# Patient Record
Sex: MALE | Race: WHITE | NOT HISPANIC OR LATINO | Employment: OTHER | URBAN - METROPOLITAN AREA
[De-identification: names, ages, dates, MRNs, and addresses within clinical notes are randomized per-mention and may not be internally consistent; named-entity substitution may affect disease eponyms.]

---

## 2020-02-12 ENCOUNTER — APPOINTMENT (OUTPATIENT)
Dept: MRI IMAGING | Facility: HOSPITAL | Age: 65
End: 2020-02-12

## 2020-02-12 ENCOUNTER — HOSPITAL ENCOUNTER (EMERGENCY)
Facility: HOSPITAL | Age: 65
Discharge: HOME | End: 2020-02-12
Admitting: EMERGENCY MEDICINE

## 2020-02-12 VITALS
TEMPERATURE: 97.7 F | BODY MASS INDEX: 26.48 KG/M2 | WEIGHT: 185 LBS | SYSTOLIC BLOOD PRESSURE: 175 MMHG | RESPIRATION RATE: 16 BRPM | DIASTOLIC BLOOD PRESSURE: 78 MMHG | HEART RATE: 74 BPM | OXYGEN SATURATION: 97 % | HEIGHT: 70 IN

## 2020-02-12 DIAGNOSIS — R51.9 NONINTRACTABLE HEADACHE, UNSPECIFIED CHRONICITY PATTERN, UNSPECIFIED HEADACHE TYPE: Primary | ICD-10-CM

## 2020-02-12 DIAGNOSIS — H53.9 VISUAL DISTURBANCE: ICD-10-CM

## 2020-02-12 LAB
ANION GAP SERPL CALCULATED.3IONS-SCNC: 10 MMOL/L (ref 5–15)
BASOPHILS # BLD AUTO: 0.1 10*3/MM3 (ref 0–0.2)
BASOPHILS NFR BLD AUTO: 1 % (ref 0–1.5)
BUN BLD-MCNC: 17 MG/DL (ref 8–23)
BUN/CREAT SERPL: 18.5 (ref 7–25)
CALCIUM SPEC-SCNC: 9.4 MG/DL (ref 8.6–10.5)
CHLORIDE SERPL-SCNC: 102 MMOL/L (ref 98–107)
CO2 SERPL-SCNC: 28 MMOL/L (ref 22–29)
CREAT BLD-MCNC: 0.92 MG/DL (ref 0.76–1.27)
CRP SERPL-MCNC: 0.27 MG/DL (ref 0–0.5)
DEPRECATED RDW RBC AUTO: 44.2 FL (ref 37–54)
EOSINOPHIL # BLD AUTO: 0.1 10*3/MM3 (ref 0–0.4)
EOSINOPHIL NFR BLD AUTO: 2.1 % (ref 0.3–6.2)
ERYTHROCYTE [DISTWIDTH] IN BLOOD BY AUTOMATED COUNT: 14.9 % (ref 12.3–15.4)
ERYTHROCYTE [SEDIMENTATION RATE] IN BLOOD: 25 MM/HR (ref 0–20)
GFR SERPL CREATININE-BSD FRML MDRD: 82 ML/MIN/1.73
GLUCOSE BLD-MCNC: 106 MG/DL (ref 65–99)
HCT VFR BLD AUTO: 39.2 % (ref 37.5–51)
HGB BLD-MCNC: 13.6 G/DL (ref 13–17.7)
HOLD SPECIMEN: NORMAL
HOLD SPECIMEN: NORMAL
LYMPHOCYTES # BLD AUTO: 1.9 10*3/MM3 (ref 0.7–3.1)
LYMPHOCYTES NFR BLD AUTO: 29.9 % (ref 19.6–45.3)
MCH RBC QN AUTO: 29.3 PG (ref 26.6–33)
MCHC RBC AUTO-ENTMCNC: 34.6 G/DL (ref 31.5–35.7)
MCV RBC AUTO: 84.7 FL (ref 79–97)
MONOCYTES # BLD AUTO: 0.4 10*3/MM3 (ref 0.1–0.9)
MONOCYTES NFR BLD AUTO: 6.3 % (ref 5–12)
NEUTROPHILS # BLD AUTO: 3.8 10*3/MM3 (ref 1.7–7)
NEUTROPHILS NFR BLD AUTO: 60.7 % (ref 42.7–76)
NRBC BLD AUTO-RTO: 0.1 /100 WBC (ref 0–0.2)
PLATELET # BLD AUTO: 262 10*3/MM3 (ref 140–450)
PMV BLD AUTO: 7.2 FL (ref 6–12)
POTASSIUM BLD-SCNC: 4.2 MMOL/L (ref 3.5–5.2)
RBC # BLD AUTO: 4.63 10*6/MM3 (ref 4.14–5.8)
SODIUM BLD-SCNC: 140 MMOL/L (ref 136–145)
WBC NRBC COR # BLD: 6.3 10*3/MM3 (ref 3.4–10.8)
WHOLE BLOOD HOLD SPECIMEN: NORMAL
WHOLE BLOOD HOLD SPECIMEN: NORMAL

## 2020-02-12 PROCEDURE — 86140 C-REACTIVE PROTEIN: CPT | Performed by: NURSE PRACTITIONER

## 2020-02-12 PROCEDURE — 85652 RBC SED RATE AUTOMATED: CPT | Performed by: NURSE PRACTITIONER

## 2020-02-12 PROCEDURE — 99283 EMERGENCY DEPT VISIT LOW MDM: CPT

## 2020-02-12 PROCEDURE — 80048 BASIC METABOLIC PNL TOTAL CA: CPT | Performed by: NURSE PRACTITIONER

## 2020-02-12 PROCEDURE — 70551 MRI BRAIN STEM W/O DYE: CPT

## 2020-02-12 PROCEDURE — 85025 COMPLETE CBC W/AUTO DIFF WBC: CPT | Performed by: NURSE PRACTITIONER

## 2020-02-12 RX ORDER — IBUPROFEN 800 MG/1
800 TABLET ORAL EVERY 8 HOURS PRN
Qty: 9 TABLET | Refills: 0 | Status: SHIPPED | OUTPATIENT
Start: 2020-02-12

## 2020-02-12 RX ORDER — SODIUM CHLORIDE 0.9 % (FLUSH) 0.9 %
10 SYRINGE (ML) INJECTION AS NEEDED
Status: DISCONTINUED | OUTPATIENT
Start: 2020-02-12 | End: 2020-02-12 | Stop reason: HOSPADM

## 2020-02-12 NOTE — ED NOTES
Pt reports seeing color flashes and light flashes in the bottom left section of his eye a week ago and has had a headache since then.        Elen Connors,  ARIELLA  02/12/20 1312       Elen Connors, RN  02/12/20 1312

## 2020-02-12 NOTE — ED PROVIDER NOTES
Subjective   Chief complaint: Headache visual disturbance      Context: Patient is a 65-year-old male who comes in ambulatory by private vehicle with complaints of visual changes and headache.  He states approximately 2 weeks ago he started having some color discoloration in the left lower lateral visual field with changes in color is yellow-green and blue x2 weeks ago.  He states that resolved approximately a week ago and he now has a generalized intractable headache.  He reports no history of migraines.  He states he gets an occasional mild headache that he takes an Excedrin for that relieves it.  He denies any thunderclap type noise.  He states the headache is not localized.  He denies any unilateral focal deficits weakness confusion ataxia or lethargy.  He denies any fever vomiting or diarrhea.  He states no changes in hearing or tinnitus.  Last eye exam was approximately year ago and was notified that he does have cataracts.  He has not had visual field loss and states that his vision appears back to normal now.  He has had some minor nasal congestion that he has been taking over-the-counter Sudafed occasionally for.  He denies any rash.  Denies any stiff neck or sore throat.  He denies any photophobia or phonophobia.  Has not yet seen his primary care doctor been evaluated for this.  He went to the eye doctor today and did not receive any exam and was instructed to come to the emergency department.  Patient states the only medications he takes are lisinopril and Lexapro and over-the-counter vitamins and supplements    Duration: 2 weeks    Timing: Waxes and wanes    Severity: Moderate    Associated symptoms: Denies          PCP:              Review of Systems   Constitutional: Negative.    HENT: Positive for congestion. Negative for hearing loss, tinnitus, trouble swallowing and voice change.    Eyes: Positive for visual disturbance.   Respiratory: Negative.    Cardiovascular: Negative.    Gastrointestinal:  Negative.    Endocrine: Negative.    Genitourinary: Negative.    Musculoskeletal: Negative.    Skin: Negative.    Allergic/Immunologic: Negative for immunocompromised state.   Neurological: Positive for headaches.   Hematological: Negative.    Psychiatric/Behavioral: Negative.        Past Medical History:   Diagnosis Date   • Anxiety    • Arthritis    • Hypertension        No Known Allergies    Past Surgical History:   Procedure Laterality Date   • CARPAL TUNNEL RELEASE     • CHOLECYSTECTOMY     • SHOULDER SURGERY         History reviewed. No pertinent family history.    Social History     Socioeconomic History   • Marital status: Single     Spouse name: Not on file   • Number of children: Not on file   • Years of education: Not on file   • Highest education level: Not on file   Tobacco Use   • Smoking status: Former Smoker   Substance and Sexual Activity   • Alcohol use: Yes   • Drug use: Never           Objective   Physical Exam   Eyes: Pupils are equal, round, and reactive to light. Conjunctivae, EOM and lids are normal.   Fundoscopic exam:       The right eye shows no exudate, no hemorrhage and no papilledema.        The left eye shows no exudate, no hemorrhage and no papilledema.       Vital signs in triage nurse note reviewed.   Constitutional: Awake, alert, well developed and well nourished. No acute distress is noted.   HEENT: Normocephalic, atraumatic; pupils are PERRL with intact EOM; oropharynx is pink and moist without exudate or erythema.   Neck: Supple, full range of motion without pain; no cervical lymphadenopathy.   Cardiovascular: Regular rate and rhythm, normal S1-S2.     Pulmonary: Respiratory effort regular, nonlabored; breath sounds clear to auscultation all fields.   Abdomen: Soft, nontender, nondistended with normoactive bowel sounds; no rebound or guarding.   Musculoskeletal: Independent range of motion of all extremities, no palpable tenderness or edema. Spine is midline without obvious  curvature scoliosis. No bony tenderness, soft tissue swelling, deformity is noted. Paraspinal musculature is soft, nontender.   Neuro: Alert oriented x3, speech is clear and appropriate. Cranial nerves 2-12 grossly intact, no pronator drift, normal coordination.       Procedures           ED Course      Labs Reviewed   BASIC METABOLIC PANEL - Abnormal; Notable for the following components:       Result Value    Glucose 106 (*)     All other components within normal limits    Narrative:     GFR Normal >60  Chronic Kidney Disease <60  Kidney Failure <15     SEDIMENTATION RATE - Abnormal; Notable for the following components:    Sed Rate 25 (*)     All other components within normal limits   C-REACTIVE PROTEIN - Normal   CBC WITH AUTO DIFFERENTIAL - Normal   RAINBOW DRAW    Narrative:     The following orders were created for panel order [facility] Draw.  Procedure                               Abnormality         Status                     ---------                               -----------         ------                     Light Blue Top[[id number]]                                   Final result               Green Top (Gel)[[id number]]                                  Final result               Lavender Top[[id number]]                                     Final result               Gold Top - SST[[id number]]                                   Final result                 Please view results for these tests on the individual orders.   CBC AND DIFFERENTIAL    Narrative:     The following orders were created for panel order CBC & Differential.  Procedure                               Abnormality         Status                     ---------                               -----------         ------                     CBC Auto Differential[[id number]]        Normal              Final result                 Please view results for these tests on the individual orders.   LIGHT BLUE TOP   GREEN TOP   LAVENDER TOP   GOLD TOP - SST      Medications   sodium chloride 0.9 % flush 10 mL (has no administration in time range)     Mri Brain Without Contrast    Result Date: 2/12/2020   1. Scattered mild chronic microvascular disease changes. No acute findings.   Electronically Signed By-Dr. Nelly Ireland MD On:2/12/2020 2:37 PM This report was finalized on [id number] by Dr. Nelly Ireland MD.                                         MDM  Number of Diagnoses or Management Options  Nonintractable headache, unspecified chronicity pattern, unspecified headache type:   Diagnosis management comments: Medical decision  Comorbidities:  has a past medical history of Anxiety, Arthritis, and Hypertension.  Differentials: Tumor migraine cephalgia giant cell temporal arteritis  Radiology interpretation:  X-rays reviewed by me and interpreted by radiologist,   Mri Brain Without Contrast    Result Date: 2/12/2020   1. Scattered mild chronic microvascular disease changes. No acute findings.   Electronically Signed By-Dr. Nelly Ireland MD On:2/12/2020 2:37 PM This report was finalized on [id number] by Dr. Nelly Ireland MD.    Lab interpretation:  Labs viewed by me significant for, ESR 25    i discussed findings with patient who voices understanding of discharge instructions, signs and symptoms requiring return to ED; discharged improved and in stable condition with follow up for re-evaluation.  Discussed with Dr. ryder  Patient was encouraged to follow-up with his primary care doctor and ophthalmology       Amount and/or Complexity of Data Reviewed  Clinical lab tests: reviewed    Patient Progress  Patient progress: stable      Final diagnoses:   Nonintractable headache, unspecified chronicity pattern, unspecified headache type            Adelina Poe, APRN  02/12/20 1506

## 2020-02-12 NOTE — ED NOTES
Started having flashing wheel out of left eye 2 weeks ago on and off. Now is just having a headache that he can not get rid of.      Tg Christopher, RN  02/12/20 1590

## 2020-08-01 ENCOUNTER — HOSPITAL ENCOUNTER (EMERGENCY)
Facility: HOSPITAL | Age: 65
Discharge: HOME | End: 2020-08-01
Attending: EMERGENCY MEDICINE | Admitting: EMERGENCY MEDICINE

## 2020-08-01 ENCOUNTER — APPOINTMENT (OUTPATIENT)
Dept: CT IMAGING | Facility: HOSPITAL | Age: 65
End: 2020-08-01

## 2020-08-01 ENCOUNTER — APPOINTMENT (OUTPATIENT)
Dept: GENERAL RADIOLOGY | Facility: HOSPITAL | Age: 65
End: 2020-08-01

## 2020-08-01 VITALS
SYSTOLIC BLOOD PRESSURE: 119 MMHG | BODY MASS INDEX: 27.8 KG/M2 | DIASTOLIC BLOOD PRESSURE: 73 MMHG | WEIGHT: 194.2 LBS | HEIGHT: 70 IN | HEART RATE: 86 BPM | TEMPERATURE: 100.1 F | OXYGEN SATURATION: 95 % | RESPIRATION RATE: 17 BRPM

## 2020-08-01 DIAGNOSIS — J12.9 VIRAL PNEUMONIA: ICD-10-CM

## 2020-08-01 DIAGNOSIS — U07.1 COVID-19 VIRUS INFECTION: Primary | ICD-10-CM

## 2020-08-01 LAB
ALBUMIN SERPL-MCNC: 3.7 G/DL (ref 3.5–5.2)
ALBUMIN/GLOB SERPL: 1.1 G/DL
ALP SERPL-CCNC: 127 U/L (ref 39–117)
ALT SERPL W P-5'-P-CCNC: 41 U/L (ref 1–41)
ANION GAP SERPL CALCULATED.3IONS-SCNC: 12.1 MMOL/L (ref 5–15)
AST SERPL-CCNC: 36 U/L (ref 1–40)
BASOPHILS # BLD AUTO: 0 10*3/MM3 (ref 0–0.2)
BASOPHILS NFR BLD AUTO: 0 % (ref 0–1.5)
BILIRUB SERPL-MCNC: 0.2 MG/DL (ref 0–1.2)
BUN SERPL-MCNC: 12 MG/DL (ref 8–23)
BUN/CREAT SERPL: 11.9 (ref 7–25)
CALCIUM SPEC-SCNC: 8.7 MG/DL (ref 8.6–10.5)
CHLORIDE SERPL-SCNC: 101 MMOL/L (ref 98–107)
CO2 SERPL-SCNC: 22.9 MMOL/L (ref 22–29)
CREAT SERPL-MCNC: 1.01 MG/DL (ref 0.76–1.27)
D-LACTATE SERPL-SCNC: 0.9 MMOL/L (ref 0.5–2)
DEPRECATED RDW RBC AUTO: 40.7 FL (ref 37–54)
EOSINOPHIL # BLD AUTO: 0 10*3/MM3 (ref 0–0.4)
EOSINOPHIL NFR BLD AUTO: 0 % (ref 0.3–6.2)
ERYTHROCYTE [DISTWIDTH] IN BLOOD BY AUTOMATED COUNT: 13.9 % (ref 12.3–15.4)
GFR SERPL CREATININE-BSD FRML MDRD: 74 ML/MIN/1.73
GLOBULIN UR ELPH-MCNC: 3.3 GM/DL
GLUCOSE SERPL-MCNC: 133 MG/DL (ref 65–99)
HCT VFR BLD AUTO: 35.8 % (ref 37.5–51)
HGB BLD-MCNC: 12.4 G/DL (ref 13–17.7)
HOLD SPECIMEN: NORMAL
HOLD SPECIMEN: NORMAL
IMM GRANULOCYTES # BLD AUTO: 0.02 10*3/MM3 (ref 0–0.05)
IMM GRANULOCYTES NFR BLD AUTO: 0.4 % (ref 0–0.5)
LYMPHOCYTES # BLD AUTO: 0.6 10*3/MM3 (ref 0.7–3.1)
LYMPHOCYTES NFR BLD AUTO: 10.9 % (ref 19.6–45.3)
MCH RBC QN AUTO: 28.1 PG (ref 26.6–33)
MCHC RBC AUTO-ENTMCNC: 34.6 G/DL (ref 31.5–35.7)
MCV RBC AUTO: 81 FL (ref 79–97)
MONOCYTES # BLD AUTO: 0.26 10*3/MM3 (ref 0.1–0.9)
MONOCYTES NFR BLD AUTO: 4.7 % (ref 5–12)
NEUTROPHILS NFR BLD AUTO: 4.64 10*3/MM3 (ref 1.7–7)
NEUTROPHILS NFR BLD AUTO: 84 % (ref 42.7–76)
NRBC BLD AUTO-RTO: 0 /100 WBC (ref 0–0.2)
NT-PROBNP SERPL-MCNC: 133.1 PG/ML (ref 0–900)
PLATELET # BLD AUTO: 211 10*3/MM3 (ref 140–450)
PMV BLD AUTO: 9.7 FL (ref 6–12)
POTASSIUM SERPL-SCNC: 3.5 MMOL/L (ref 3.5–5.2)
PROCALCITONIN SERPL-MCNC: 0.22 NG/ML (ref 0–0.25)
PROT SERPL-MCNC: 7 G/DL (ref 6–8.5)
RBC # BLD AUTO: 4.42 10*6/MM3 (ref 4.14–5.8)
SODIUM SERPL-SCNC: 136 MMOL/L (ref 136–145)
TROPONIN T SERPL-MCNC: <0.01 NG/ML (ref 0–0.03)
WBC # BLD AUTO: 5.52 10*3/MM3 (ref 3.4–10.8)
WHOLE BLOOD HOLD SPECIMEN: NORMAL
WHOLE BLOOD HOLD SPECIMEN: NORMAL

## 2020-08-01 PROCEDURE — 93005 ELECTROCARDIOGRAM TRACING: CPT | Performed by: EMERGENCY MEDICINE

## 2020-08-01 PROCEDURE — 70450 CT HEAD/BRAIN W/O DYE: CPT

## 2020-08-01 PROCEDURE — 83605 ASSAY OF LACTIC ACID: CPT | Performed by: EMERGENCY MEDICINE

## 2020-08-01 PROCEDURE — 87040 BLOOD CULTURE FOR BACTERIA: CPT | Performed by: EMERGENCY MEDICINE

## 2020-08-01 PROCEDURE — 84145 PROCALCITONIN (PCT): CPT | Performed by: EMERGENCY MEDICINE

## 2020-08-01 PROCEDURE — 25010000002 MORPHINE PER 10 MG: Performed by: EMERGENCY MEDICINE

## 2020-08-01 PROCEDURE — 84484 ASSAY OF TROPONIN QUANT: CPT | Performed by: EMERGENCY MEDICINE

## 2020-08-01 PROCEDURE — 85025 COMPLETE CBC W/AUTO DIFF WBC: CPT | Performed by: EMERGENCY MEDICINE

## 2020-08-01 PROCEDURE — 83880 ASSAY OF NATRIURETIC PEPTIDE: CPT | Performed by: EMERGENCY MEDICINE

## 2020-08-01 PROCEDURE — 93010 ELECTROCARDIOGRAM REPORT: CPT | Performed by: INTERNAL MEDICINE

## 2020-08-01 PROCEDURE — 99284 EMERGENCY DEPT VISIT MOD MDM: CPT

## 2020-08-01 PROCEDURE — 80053 COMPREHEN METABOLIC PANEL: CPT | Performed by: EMERGENCY MEDICINE

## 2020-08-01 PROCEDURE — 71045 X-RAY EXAM CHEST 1 VIEW: CPT

## 2020-08-01 PROCEDURE — 96374 THER/PROPH/DIAG INJ IV PUSH: CPT

## 2020-08-01 RX ORDER — MORPHINE SULFATE 2 MG/ML
4 INJECTION, SOLUTION INTRAMUSCULAR; INTRAVENOUS ONCE
Status: COMPLETED | OUTPATIENT
Start: 2020-08-01 | End: 2020-08-01

## 2020-08-01 RX ORDER — SODIUM CHLORIDE 0.9 % (FLUSH) 0.9 %
10 SYRINGE (ML) INJECTION AS NEEDED
Status: DISCONTINUED | OUTPATIENT
Start: 2020-08-01 | End: 2020-08-01 | Stop reason: HOSPADM

## 2020-08-01 RX ORDER — ALBUTEROL SULFATE 90 UG/1
AEROSOL, METERED RESPIRATORY (INHALATION)
Qty: 1 INHALER | Refills: 0 | Status: SHIPPED | OUTPATIENT
Start: 2020-08-01

## 2020-08-01 RX ORDER — ACETAMINOPHEN 500 MG
1000 TABLET ORAL ONCE
Status: COMPLETED | OUTPATIENT
Start: 2020-08-01 | End: 2020-08-01

## 2020-08-01 RX ORDER — ESCITALOPRAM OXALATE 5 MG/1
5 TABLET ORAL DAILY
COMMUNITY
Start: 2015-04-21

## 2020-08-01 RX ORDER — LISINOPRIL 5 MG/1
5 TABLET ORAL DAILY
COMMUNITY
Start: 2015-04-21

## 2020-08-01 RX ADMIN — SODIUM CHLORIDE 1000 ML: 9 INJECTION, SOLUTION INTRAVENOUS at 13:44

## 2020-08-01 RX ADMIN — MORPHINE SULFATE 4 MG: 2 INJECTION, SOLUTION INTRAMUSCULAR; INTRAVENOUS at 14:09

## 2020-08-01 RX ADMIN — ACETAMINOPHEN 1000 MG: 500 TABLET, FILM COATED ORAL at 14:08

## 2020-08-01 NOTE — ED PROVIDER NOTES
EMERGENCY DEPARTMENT ENCOUNTER    Room Number:  19/19  Date of encounter:  8/1/2020  PCP: Xavier Walden MD  Historian: Patient    Patient was placed in face mask during triage process. Patient was wearing facemask when I entered the room and throughout our encounter. I wore full protective equipment throughout this patient encounter including a face mask, eye protection, and gloves. Hand hygiene was performed before donning protective equipment and again following doffing of PPE after leaving the room.    HPI:  Chief Complaint: Shortness of breath status post COVID-19 diagnosis  A complete HPI/ROS/PMH/PSH/SH/FH are unobtainable due to: N/A   Context: Sudhir Diaz is a 65 y.o. male who presents to the ED c/o continued generalized weakness, increased cough with some shortness of breath, headache, and diminished appetite.  Patient developed symptoms 10 days ago and was diagnosed 1 week ago with COVID-19.  He reports significant fatigue.  He has had intermittent headache but had a passing out spell after episode of coughing yesterday.  No known trauma but does report the headache is 7 out of 10.  No other exacerbating relieving factors.  No vomiting or diarrhea in the last few days.  Patient does continue to have significant chills.      MEDICAL HISTORY REVIEW      PAST MEDICAL HISTORY  Active Ambulatory Problems     Diagnosis Date Noted   • No Active Ambulatory Problems     Resolved Ambulatory Problems     Diagnosis Date Noted   • No Resolved Ambulatory Problems     Past Medical History:   Diagnosis Date   • Anxiety    • Arthritis    • Hypertension          PAST SURGICAL HISTORY  Past Surgical History:   Procedure Laterality Date   • CARPAL TUNNEL RELEASE     • CHOLECYSTECTOMY     • SHOULDER SURGERY           FAMILY HISTORY  History reviewed. No pertinent family history.      SOCIAL HISTORY  Social History     Socioeconomic History   • Marital status: Single     Spouse name: Not on file   • Number of children:  Not on file   • Years of education: Not on file   • Highest education level: Not on file   Tobacco Use   • Smoking status: Former Smoker   • Smokeless tobacco: Never Used   Substance and Sexual Activity   • Alcohol use: Yes     Comment: occasional   • Drug use: Never         ALLERGIES  Patient has no known allergies.        REVIEW OF SYSTEMS  Review of Systems     All systems reviewed and negative except for those discussed in HPI.       PHYSICAL EXAM    I have reviewed the triage vital signs and nursing notes.    ED Triage Vitals [08/01/20 1209]   Temp Heart Rate Resp BP SpO2   100.1 °F (37.8 °C) 100 18 130/76 95 %      Temp src Heart Rate Source Patient Position BP Location FiO2 (%)   Tympanic Monitor Sitting Right arm --       Physical Exam    Physical Exam   Constitutional: No distress.  Ill-appearing though not overtly toxic  HENT:  Head: Normocephalic and atraumatic.  No focal external findings of trauma  Oropharynx: Mucous membranes are moist.   Eyes: No scleral icterus. No conjunctival pallor.  No midline tenderness to palpation or step-off.  Neck: Painless range of motion noted. Neck supple.   Cardiovascular: Normal rate, regular rhythm and intact distal pulses.  Pulmonary/Chest: No respiratory distress. There are no wheezes, no rhonchi, and no rales.   Abdominal: Soft. There is no tenderness. There is no rebound and no guarding.   Musculoskeletal: Moves all extremities equally. There is no pedal edema or calf tenderness.   Neurological: Alert.  Baseline strength and sensation noted.   Skin: Skin is pink, warm, and dry. No pallor.   Psychiatric: Mood and affect normal.   Nursing note and vitals reviewed.    LAB RESULTS  Recent Results (from the past 24 hour(s))   Comprehensive Metabolic Panel    Collection Time: 08/01/20  1:40 PM   Result Value Ref Range    Glucose 133 (H) 65 - 99 mg/dL    BUN 12 8 - 23 mg/dL    Creatinine 1.01 0.76 - 1.27 mg/dL    Sodium 136 136 - 145 mmol/L    Potassium 3.5 3.5 - 5.2  mmol/L    Chloride 101 98 - 107 mmol/L    CO2 22.9 22.0 - 29.0 mmol/L    Calcium 8.7 8.6 - 10.5 mg/dL    Total Protein 7.0 6.0 - 8.5 g/dL    Albumin 3.70 3.50 - 5.20 g/dL    ALT (SGPT) 41 1 - 41 U/L    AST (SGOT) 36 1 - 40 U/L    Alkaline Phosphatase 127 (H) 39 - 117 U/L    Total Bilirubin 0.2 0.0 - 1.2 mg/dL    eGFR Non African Amer 74 >60 mL/min/1.73    Globulin 3.3 gm/dL    A/G Ratio 1.1 g/dL    BUN/Creatinine Ratio 11.9 7.0 - 25.0    Anion Gap 12.1 5.0 - 15.0 mmol/L   Lactic Acid, Plasma    Collection Time: 08/01/20  1:40 PM   Result Value Ref Range    Lactate 0.9 0.5 - 2.0 mmol/L   Procalcitonin    Collection Time: 08/01/20  1:40 PM   Result Value Ref Range    Procalcitonin 0.22 0.00 - 0.25 ng/mL   Troponin    Collection Time: 08/01/20  1:40 PM   Result Value Ref Range    Troponin T <0.010 0.000 - 0.030 ng/mL   BNP    Collection Time: 08/01/20  1:40 PM   Result Value Ref Range    proBNP 133.1 0.0 - 900.0 pg/mL   Light Blue Top    Collection Time: 08/01/20  1:40 PM   Result Value Ref Range    Extra Tube hold for add-on    Green Top (Gel)    Collection Time: 08/01/20  1:40 PM   Result Value Ref Range    Extra Tube Hold for add-ons.    Lavender Top    Collection Time: 08/01/20  1:40 PM   Result Value Ref Range    Extra Tube hold for add-on    Gold Top - SST    Collection Time: 08/01/20  1:40 PM   Result Value Ref Range    Extra Tube Hold for add-ons.    CBC Auto Differential    Collection Time: 08/01/20  1:40 PM   Result Value Ref Range    WBC 5.52 3.40 - 10.80 10*3/mm3    RBC 4.42 4.14 - 5.80 10*6/mm3    Hemoglobin 12.4 (L) 13.0 - 17.7 g/dL    Hematocrit 35.8 (L) 37.5 - 51.0 %    MCV 81.0 79.0 - 97.0 fL    MCH 28.1 26.6 - 33.0 pg    MCHC 34.6 31.5 - 35.7 g/dL    RDW 13.9 12.3 - 15.4 %    RDW-SD 40.7 37.0 - 54.0 fl    MPV 9.7 6.0 - 12.0 fL    Platelets 211 140 - 450 10*3/mm3    Neutrophil % 84.0 (H) 42.7 - 76.0 %    Lymphocyte % 10.9 (L) 19.6 - 45.3 %    Monocyte % 4.7 (L) 5.0 - 12.0 %    Eosinophil % 0.0 (L) 0.3  - 6.2 %    Basophil % 0.0 0.0 - 1.5 %    Immature Grans % 0.4 0.0 - 0.5 %    Neutrophils, Absolute 4.64 1.70 - 7.00 10*3/mm3    Lymphocytes, Absolute 0.60 (L) 0.70 - 3.10 10*3/mm3    Monocytes, Absolute 0.26 0.10 - 0.90 10*3/mm3    Eosinophils, Absolute 0.00 0.00 - 0.40 10*3/mm3    Basophils, Absolute 0.00 0.00 - 0.20 10*3/mm3    Immature Grans, Absolute 0.02 0.00 - 0.05 10*3/mm3    nRBC 0.0 0.0 - 0.2 /100 WBC       Ordered the above labs and independently reviewed the results.        RADIOLOGY  Ct Head Without Contrast    Result Date: 8/1/2020  CT HEAD WO CONTRAST-  INDICATIONS: Headache, syncope  TECHNIQUE: Radiation dose reduction techniques were utilized, including automated exposure control and exposure modulation based on body size. Noncontrast head CT  COMPARISON: None available  FINDINGS:    No acute intracranial hemorrhage, midline shift or mass effect. No acute territorial infarct is identified.  Arterial calcifications are seen at the base of the brain.  Ventricles, cisterns, cerebral sulci are unremarkable for patient age.  Frontal sinuses are hypoplastic. The visualized paranasal sinuses, orbits, mastoid air cells are otherwise unremarkable.           No acute intracranial hemorrhage or hydrocephalus. If there is further clinical concern, MRI could be considered for further evaluation.  This report was finalized on 8/1/2020 2:24 PM by Dr. Kj King M.D.      Xr Chest 1 View    Result Date: 8/1/2020  SINGLE VIEW CHEST RADIOGRAPH  HISTORY: 65-year-old male who is COVID positive and is undergoing evaluation for progression of symptoms.  FINDINGS: An upright AP portable chest radiograph was obtained. No prior chest radiograph is available for comparison. Interstitial opacification is seen in the bilateral mid and lower lung fields. The lungs are clear of consolidation. The cardiac silhouette is enlarged. No evidence for a pneumothorax or pleural effusion is appreciated.      Cardiomegaly with  bibasilar interstitial pneumonia and/or scarring.        I ordered the above noted radiological studies. Reviewed by me and discussed with radiologist.  See dictation for official radiology interpretation.      PROCEDURES    Procedures        MEDICATIONS GIVEN IN ER    Medications   sodium chloride 0.9 % flush 10 mL (has no administration in time range)   sodium chloride 0.9 % bolus 1,000 mL (0 mL Intravenous Stopped 8/1/20 1519)   acetaminophen (TYLENOL) tablet 1,000 mg (1,000 mg Oral Given 8/1/20 1408)   morphine injection 4 mg (4 mg Intravenous Given 8/1/20 1409)         PROGRESS, DATA ANALYSIS, CONSULTS, AND MEDICAL DECISION MAKING    My differential diagnosis includes but is not limited to generalized weakness, CVA, TIA, acute MI, GI bleed, urinary tract infection, systemic infections including sepsis, alcohol abuse, drug abuse including prescription and street drug.      All labs have been independently reviewed by me.  All radiology studies have been reviewed by me and discussed with radiologist dictating the report.   EKG's independently viewed and interpreted by me.  Discussion below represents my analysis of pertinent findings related to patient's condition, differential diagnosis, treatment plan and final disposition.      ED Course as of Aug 01 1615   Sat Aug 01, 2020   1327 EKG           EKG time: 3  Rhythm/Rate: Sinus, 85 SHARONA within normal limits  P waves and WI: Narrow QRS complex  QRS, axis: Narrow QRS complex  ST and T waves: No STEMI; QTC within normal limits    Interpreted Contemporaneously by me, independently viewed        [RS]   7547 No acute life threat identified.  Patient maintaining reasonable pulse oximetry.  Chest x-ray likely represents atelectasis from inactivity but viral pneumonia is also possibility.  Patient appears stable for discharge.  We will add an albuterol inhaler to his prescription list, encourage p.o. fluids, and disposition him with a pulse oximetry monitor his own oxygen  level at home.  Patient agreeable discharge plan.    [RS]      ED Course User Index  [RS] Ghulam West MD       AS OF 16:15 VITALS:    BP - 124/73  HR - 80  TEMP - 100.1 °F (37.8 °C) (Tympanic)  O2 SATS - 95%        DIAGNOSIS  Final diagnoses:   COVID-19 virus infection   Viral pneumonia         DISPOSITION  DISCHARGE    Patient discharged in stable condition.    Reviewed implications of results, diagnosis, meds, responsibility to follow up, warning signs and symptoms of possible worsening, potential complications and reasons to return to ER.    Patient/Family voiced understanding of above instructions.    Discussed plan for discharge, as there is no emergent indication for admission. Patient referred to primary care provider for regular health maintenance. Pt/family is agreeable and understands need for follow up and possible repeat testing.  Pt is aware that discharge does not mean that nothing is wrong but it indicates no emergency is present that requires admission and they must continue care with follow-up as given below or physician of their choice.     FOLLOW-UP  Xavier Walden MD  [address] [facility] [address] [phone]    Schedule an appointment as soon as possible for a visit       [facility]  [address] [facility] [address] [phone]  Go to   As needed, If symptoms worsen         Medication List      New Prescriptions    albuterol sulfate  (90 Base) MCG/ACT inhaler  Commonly known as:  PROVENTIL HFA;VENTOLIN HFA;PROAIR HFA  Inhale 2 puffs every 4 hours when necessary wheeze, dyspnea, cough             Ghulam West MD  08/01/20 3812

## 2020-08-01 NOTE — PROGRESS NOTES
ER provider, Dr. West, requested CCP give pulse oximetry to pt. Pt provided with pulse oximetry and educated on use by primary RN, Milagro, while wearing full PPE. Notified of when to contact PCP. Call center phone number given to pt. Monique Nunn, RN

## 2020-08-06 LAB
BACTERIA SPEC AEROBE CULT: NORMAL
BACTERIA SPEC AEROBE CULT: NORMAL

## 2020-11-25 NOTE — ED NOTES
I was on full PPE while doing EKG     Ambrocio Gore  08/01/20 3004    
Pt  A&ox4, abc's intact, NAD noted. Pt ambulatory with steady gait on discharge.      Milagro Merritt, RN  08/01/20 3416    
Pt presents to ED via EMS from home. Pt states he tested positive for COVID on Saturday. Pt states this morning he feels more SOA and had a fall last night. Pt states he woke up on the floor this morning. Pt is currently A&OX4, able to ambulate, and in a mask at this time.      Janeth Cox, RN  08/01/20 5650    
Pt waiting in room until wife arrives to take him home. Discharge instructions reviewed, pt stated understanding.      Milagro Merritt RN  08/01/20 8238    
RN wearing mask, gown, face shield, hair and shoe covers and gloves throughout duration of care.        Milagro Merritt, RN  08/01/20 3636    
PROBLEM DIAGNOSES  Problem: Unilateral primary osteoarthritis, left hip  Assessment and Plan: Left total hip arthroplaxty  labs - cbc,pt/ptt,bmp,t&s,nose cx,ekg  M/C required  preop 3 day hibiclens instruction reviewed and given .instructed on if  nose cx positive use mupuricin 5 days and checklist given  take routine meds DOS with sips of water. avoid NSAID and OTC supplements. verbalized understanding  information on proper nutrition , increase protein and better food choices provided in packet   Ensure clear given    Problem: Obesity  Assessment and Plan: ROMIE percautions

## 2022-12-01 PROCEDURE — U0004 COV-19 TEST NON-CDC HGH THRU: HCPCS | Performed by: FAMILY MEDICINE

## 2023-09-21 ENCOUNTER — TRANSCRIBE ORDERS (OUTPATIENT)
Dept: ADMINISTRATIVE | Facility: HOSPITAL | Age: 68
End: 2023-09-21
Payer: COMMERCIAL

## 2023-09-21 DIAGNOSIS — M54.50 BACK PAIN AT L4-L5 LEVEL: Primary | ICD-10-CM

## 2023-10-02 ENCOUNTER — HOSPITAL ENCOUNTER (OUTPATIENT)
Dept: ULTRASOUND IMAGING | Facility: HOSPITAL | Age: 68
Discharge: HOME | End: 2023-10-02
Admitting: INTERNAL MEDICINE
Payer: COMMERCIAL

## 2023-10-02 DIAGNOSIS — M54.50 BACK PAIN AT L4-L5 LEVEL: ICD-10-CM

## 2023-10-02 PROCEDURE — 76775 US EXAM ABDO BACK WALL LIM: CPT

## 2024-04-03 ENCOUNTER — HOSPITAL ENCOUNTER (OUTPATIENT)
Dept: GENERAL RADIOLOGY | Facility: HOSPITAL | Age: 69
Discharge: HOME | End: 2024-04-03
Admitting: INTERNAL MEDICINE
Payer: COMMERCIAL

## 2024-04-03 ENCOUNTER — TRANSCRIBE ORDERS (OUTPATIENT)
Dept: ADMINISTRATIVE | Facility: HOSPITAL | Age: 69
End: 2024-04-03
Payer: COMMERCIAL

## 2024-04-03 DIAGNOSIS — R06.02 SHORTNESS OF BREATH: Primary | ICD-10-CM

## 2024-04-03 DIAGNOSIS — R06.02 SHORTNESS OF BREATH: ICD-10-CM

## 2024-04-03 PROCEDURE — 71046 X-RAY EXAM CHEST 2 VIEWS: CPT

## 2024-04-09 ENCOUNTER — PATIENT ROUNDING (BHMG ONLY) (OUTPATIENT)
Dept: CARDIOLOGY | Facility: CLINIC | Age: 69
End: 2024-04-09

## 2024-04-09 ENCOUNTER — OFFICE VISIT (OUTPATIENT)
Dept: CARDIOLOGY | Facility: CLINIC | Age: 69
End: 2024-04-09
Payer: COMMERCIAL

## 2024-04-09 VITALS
SYSTOLIC BLOOD PRESSURE: 140 MMHG | BODY MASS INDEX: 26.63 KG/M2 | DIASTOLIC BLOOD PRESSURE: 90 MMHG | WEIGHT: 186 LBS | HEART RATE: 87 BPM | HEIGHT: 70 IN | OXYGEN SATURATION: 97 %

## 2024-04-09 DIAGNOSIS — R06.02 SHORTNESS OF BREATH: ICD-10-CM

## 2024-04-09 DIAGNOSIS — R07.89 CHEST DISCOMFORT: Primary | ICD-10-CM

## 2024-04-09 DIAGNOSIS — I10 ESSENTIAL HYPERTENSION: ICD-10-CM

## 2024-04-09 DIAGNOSIS — I44.7 LBBB (LEFT BUNDLE BRANCH BLOCK): ICD-10-CM

## 2024-04-09 PROCEDURE — 99204 OFFICE O/P NEW MOD 45 MIN: CPT | Performed by: INTERNAL MEDICINE

## 2024-04-09 PROCEDURE — 93000 ELECTROCARDIOGRAM COMPLETE: CPT | Performed by: INTERNAL MEDICINE

## 2024-04-09 RX ORDER — BUSPIRONE HYDROCHLORIDE 10 MG/1
TABLET ORAL
COMMUNITY
Start: 2024-04-03

## 2024-04-09 RX ORDER — LISINOPRIL 20 MG/1
20 TABLET ORAL EVERY MORNING
COMMUNITY
Start: 2024-02-08

## 2024-04-09 NOTE — PROGRESS NOTES
A My-Chart message has been sent to the patient for PATIENT ROUNDING with [facility]

## 2024-04-09 NOTE — PROGRESS NOTES
Encounter Date:04/09/2024      Patient ID: Sudhir Diaz is a 69 y.o. male.    Chief Complaint:  Chest discomfort  Shortness of breath  Left bundle branch block  Hypertension  Dyslipidemia    History of present illness  The patient is a pleasant 69-year-old white male is here for evaluation of shortness of breath and chest discomfort.  Lately patient has been having significant anxiety issues.  Patient has been having exertional chest tightness and shortness of breath with activities such as going to the mailbox walking the dog etc.  Denies having any other associated aggravating or alleviating factors.    Patient is not having any palpitations, dizziness or syncope.  Denies having any headache ,abdominal pain ,nausea, vomiting , diarrhea constipation, loss of weight or loss of appetite.  Denies having any excessive bruising ,hematuria or blood in the stool.    Review of all systems negative except as indicated.    Reviewed ROS.  Assessment and Plan     ]]]]]]]]]]]]]]]]]]]  Impression  ========  - Exertional chest tightness and shortness of breath-suggestive of angina pectoris.    - Left bundle branch block-new since 8/1/2020.    - Hypertension dyslipidemia    - Status post cholecystectomy shoulder surgery and carpal tunnel surgery.    - Family history negative for coronary artery disease    - Former smoker    - No known allergies  ===========  Plan  ==========  Patient has exertional chest tightness and shortness of breath suggestive of angina pectoris.  EKG showed sinus rhythm premature ventricular contractions left bundle branch block.  Chest x-ray-normal-4/3/2024.    Left bundle branch block-new since 8/1/2020.    Stress Cardiolite test  Echocardiogram  Patient may need cardiac catheterization and coronary arteriography.    Hypertension  Blood pressure 140/90.  Maintain blood pressure log.    Dyslipidemia-diet controlled.    Follow-up in the office on the same day.    Further plan will depend on patient's  progress.  ]]]]]]]]]]]]]]]]]]]           Diagnosis Plan   1. Chest discomfort  Adult Transthoracic Echo Complete W/ Cont if Necessary Per Protocol    Stress Test With Myocardial Perfusion One Day    ECG 12 Lead      2. Shortness of breath  Adult Transthoracic Echo Complete W/ Cont if Necessary Per Protocol    Stress Test With Myocardial Perfusion One Day    ECG 12 Lead      3. Essential hypertension  Adult Transthoracic Echo Complete W/ Cont if Necessary Per Protocol    Stress Test With Myocardial Perfusion One Day    ECG 12 Lead      4. LBBB (left bundle branch block)  Adult Transthoracic Echo Complete W/ Cont if Necessary Per Protocol    Stress Test With Myocardial Perfusion One Day    ECG 12 Lead      LAB RESULTS (LAST 7 DAYS)    CBC        BMP        CMP         BNP        TROPONIN        CoAg        Creatinine Clearance  CrCl cannot be calculated (Patient's most recent lab result is older than the maximum 30 days allowed.).    ABG        Radiology  No radiology results for the last day                The following portions of the patient's history were reviewed and updated as appropriate: allergies, current medications, past family history, past medical history, past social history, past surgical history, and problem list.    Review of Systems   Constitutional: Negative for malaise/fatigue.   Cardiovascular:  Positive for chest pain. Negative for dyspnea on exertion, leg swelling and palpitations.   Respiratory:  Positive for shortness of breath. Negative for cough.    Gastrointestinal:  Negative for abdominal pain, nausea and vomiting.   Neurological:  Negative for dizziness, focal weakness, headaches, light-headedness and numbness.   All other systems reviewed and are negative.        Current Outpatient Medications:     albuterol sulfate  (90 Base) MCG/ACT inhaler, Inhale 2 puffs every 4 hours when necessary wheeze, dyspnea, cough, Disp: 1 inhaler, Rfl: 0    busPIRone (BUSPAR) 10 MG tablet, , Disp: ,  "Rfl:     escitalopram (LEXAPRO) 5 MG tablet, Take 1 tablet by mouth Daily., Disp: , Rfl:     lisinopril (PRINIVIL,ZESTRIL) 20 MG tablet, Take 1 tablet by mouth Every Morning., Disp: , Rfl:     No Known Allergies    History reviewed. No pertinent family history.    Past Surgical History:   Procedure Laterality Date    CARPAL TUNNEL RELEASE      CHOLECYSTECTOMY      SHOULDER SURGERY         Past Medical History:   Diagnosis Date    Anxiety     Arthritis     Hypertension        History reviewed. No pertinent family history.    Social History     Socioeconomic History    Marital status: Single   Tobacco Use    Smoking status: Former     Passive exposure: Past    Smokeless tobacco: Never   Vaping Use    Vaping status: Never Used   Substance and Sexual Activity    Alcohol use: Yes     Comment: occasional    Drug use: Never    Sexual activity: Defer           ECG 12 Lead    Date/Time: 4/9/2024 11:22 AM  Performed by: Peg Pettit MD    Authorized by: Peg Pettit MD  Comparison: compared with previous ECG   Similar to previous ECG  Comparison to previous ECG: Normal sinus rhythm left bundle branch block premature ventricular contraction 87/min normal axis compared to 8/1/2020 left bundle branch block is new.          Objective:       Physical Exam    /90 (BP Location: Left arm, Patient Position: Sitting, Cuff Size: Adult)   Pulse 87   Ht 177.8 cm (70\")   Wt 84.4 kg (186 lb)   SpO2 97%   BMI 26.69 kg/m²   The patient is alert, oriented and in no distress.    Vital signs as noted above.    Head and neck revealed no carotid bruits or jugular venous distension.  No thyromegaly or lymphadenopathy is present.    Lungs clear.  No wheezing.  Breath sounds are normal bilaterally.    Heart normal first and second heart sounds.  No murmur..  No pericardial rub is present.  No gallop is present.    Abdomen soft and nontender.  No organomegaly is present.    Extremities revealed good peripheral pulses without any " pedal edema.    Skin warm and dry.    Musculoskeletal system is grossly normal.    CNS grossly normal.    Reviewed and updated.

## 2024-05-09 ENCOUNTER — HOSPITAL ENCOUNTER (OUTPATIENT)
Dept: CARDIOLOGY | Facility: HOSPITAL | Age: 69
Discharge: HOME | End: 2024-05-09
Payer: COMMERCIAL

## 2024-06-11 ENCOUNTER — HOSPITAL ENCOUNTER (OUTPATIENT)
Dept: CARDIOLOGY | Facility: HOSPITAL | Age: 69
Discharge: HOME | End: 2024-06-11
Admitting: INTERNAL MEDICINE
Payer: COMMERCIAL

## 2024-06-11 ENCOUNTER — HOSPITAL ENCOUNTER (OUTPATIENT)
Dept: CARDIOLOGY | Facility: HOSPITAL | Age: 69
Discharge: HOME | End: 2024-06-11
Payer: COMMERCIAL

## 2024-06-11 ENCOUNTER — OFFICE VISIT (OUTPATIENT)
Dept: CARDIOLOGY | Facility: CLINIC | Age: 69
End: 2024-06-11
Payer: COMMERCIAL

## 2024-06-11 VITALS
HEIGHT: 70 IN | BODY MASS INDEX: 26.63 KG/M2 | SYSTOLIC BLOOD PRESSURE: 122 MMHG | HEART RATE: 70 BPM | WEIGHT: 186 LBS | DIASTOLIC BLOOD PRESSURE: 80 MMHG

## 2024-06-11 VITALS
DIASTOLIC BLOOD PRESSURE: 90 MMHG | HEIGHT: 70 IN | BODY MASS INDEX: 26.63 KG/M2 | WEIGHT: 186 LBS | SYSTOLIC BLOOD PRESSURE: 140 MMHG

## 2024-06-11 DIAGNOSIS — I10 ESSENTIAL HYPERTENSION: ICD-10-CM

## 2024-06-11 DIAGNOSIS — I44.7 LBBB (LEFT BUNDLE BRANCH BLOCK): ICD-10-CM

## 2024-06-11 DIAGNOSIS — R07.89 CHEST DISCOMFORT: ICD-10-CM

## 2024-06-11 DIAGNOSIS — R06.02 SHORTNESS OF BREATH: ICD-10-CM

## 2024-06-11 DIAGNOSIS — R94.39 ABNORMAL NUCLEAR STRESS TEST: Primary | ICD-10-CM

## 2024-06-11 LAB
BH CV ECHO MEAS - ACS: 2.18 CM
BH CV ECHO MEAS - AO MAX PG: 3 MMHG
BH CV ECHO MEAS - AO MEAN PG: 1.69 MMHG
BH CV ECHO MEAS - AO ROOT DIAM: 3.2 CM
BH CV ECHO MEAS - AO V2 MAX: 87.2 CM/SEC
BH CV ECHO MEAS - AO V2 VTI: 16.8 CM
BH CV ECHO MEAS - AVA(I,D): 1.72 CM2
BH CV ECHO MEAS - EDV(CUBED): 151.7 ML
BH CV ECHO MEAS - EDV(MOD-SP4): 136.4 ML
BH CV ECHO MEAS - EF(MOD-BP): 23 %
BH CV ECHO MEAS - EF(MOD-SP4): 23.3 %
BH CV ECHO MEAS - ESV(CUBED): 116.1 ML
BH CV ECHO MEAS - ESV(MOD-SP4): 104.6 ML
BH CV ECHO MEAS - FS: 8.5 %
BH CV ECHO MEAS - IVS/LVPW: 0.99 CM
BH CV ECHO MEAS - IVSD: 1.16 CM
BH CV ECHO MEAS - LA DIMENSION: 4.3 CM
BH CV ECHO MEAS - LV MASS(C)D: 248.3 GRAMS
BH CV ECHO MEAS - LV MAX PG: 1.51 MMHG
BH CV ECHO MEAS - LV MEAN PG: 0.86 MMHG
BH CV ECHO MEAS - LV V1 MAX: 61.4 CM/SEC
BH CV ECHO MEAS - LV V1 VTI: 11.2 CM
BH CV ECHO MEAS - LVIDD: 5.3 CM
BH CV ECHO MEAS - LVIDS: 4.9 CM
BH CV ECHO MEAS - LVOT AREA: 2.6 CM2
BH CV ECHO MEAS - LVOT DIAM: 1.81 CM
BH CV ECHO MEAS - LVPWD: 1.17 CM
BH CV ECHO MEAS - MR MAX PG: 91.2 MMHG
BH CV ECHO MEAS - MR MAX VEL: 477.1 CM/SEC
BH CV ECHO MEAS - PA ACC TIME: 0.15 SEC
BH CV ECHO MEAS - PA V2 MAX: 78.8 CM/SEC
BH CV ECHO MEAS - PI END-D VEL: 179.5 CM/SEC
BH CV ECHO MEAS - RAP SYSTOLE: 3 MMHG
BH CV ECHO MEAS - RV MAX PG: 1.13 MMHG
BH CV ECHO MEAS - RV V1 MAX: 53.1 CM/SEC
BH CV ECHO MEAS - RV V1 VTI: 12.8 CM
BH CV ECHO MEAS - RVDD: 2.9 CM
BH CV ECHO MEAS - RVSP: 40.2 MMHG
BH CV ECHO MEAS - SV(LVOT): 28.8 ML
BH CV ECHO MEAS - SV(MOD-SP4): 31.8 ML
BH CV ECHO MEAS - TR MAX PG: 37.2 MMHG
BH CV ECHO MEAS - TR MAX VEL: 304.9 CM/SEC
BH CV REST NUCLEAR ISOTOPE DOSE: 10.3 MCI
BH CV STRESS COMMENTS STAGE 1: NORMAL
BH CV STRESS DOSE REGADENOSON STAGE 1: 0.4
BH CV STRESS DURATION MIN STAGE 1: 0
BH CV STRESS DURATION SEC STAGE 1: 10
BH CV STRESS NUCLEAR ISOTOPE DOSE: 32.7 MCI
BH CV STRESS PROTOCOL 1: NORMAL
BH CV STRESS RECOVERY BP: NORMAL MMHG
BH CV STRESS RECOVERY HR: 98 BPM
BH CV STRESS STAGE 1: 1
MAXIMAL PREDICTED HEART RATE: 149 BPM
STRESS BASELINE BP: NORMAL MMHG
STRESS BASELINE HR: 70 BPM
STRESS TARGET HR: 127 BPM

## 2024-06-11 PROCEDURE — 78452 HT MUSCLE IMAGE SPECT MULT: CPT

## 2024-06-11 PROCEDURE — 0 TECHNETIUM SESTAMIBI: Performed by: INTERNAL MEDICINE

## 2024-06-11 PROCEDURE — 25010000002 REGADENOSON 0.4 MG/5ML SOLUTION: Performed by: INTERNAL MEDICINE

## 2024-06-11 PROCEDURE — 93306 TTE W/DOPPLER COMPLETE: CPT | Performed by: INTERNAL MEDICINE

## 2024-06-11 PROCEDURE — 93017 CV STRESS TEST TRACING ONLY: CPT

## 2024-06-11 PROCEDURE — A9500 TC99M SESTAMIBI: HCPCS | Performed by: INTERNAL MEDICINE

## 2024-06-11 PROCEDURE — 93306 TTE W/DOPPLER COMPLETE: CPT

## 2024-06-11 PROCEDURE — 99214 OFFICE O/P EST MOD 30 MIN: CPT | Performed by: INTERNAL MEDICINE

## 2024-06-11 RX ORDER — REGADENOSON 0.08 MG/ML
0.4 INJECTION, SOLUTION INTRAVENOUS
Status: COMPLETED | OUTPATIENT
Start: 2024-06-11 | End: 2024-06-11

## 2024-06-11 RX ADMIN — REGADENOSON 0.4 MG: 0.08 INJECTION, SOLUTION INTRAVENOUS at 09:55

## 2024-06-11 RX ADMIN — TECHNETIUM TC 99M SESTAMIBI 1 DOSE: 1 INJECTION INTRAVENOUS at 08:18

## 2024-06-11 RX ADMIN — TECHNETIUM TC 99M SESTAMIBI 1 DOSE: 1 INJECTION INTRAVENOUS at 09:55

## 2024-06-11 NOTE — PROGRESS NOTES
Encounter Date:06/11/2024  Last seen 4/9/2024      Patient ID: Sudhir Diaz is a 69 y.o. male.      Chief Complaint:  Chest discomfort  Shortness of breath  Left bundle branch block  Hypertension  Dyslipidemia     History of present illness  Patient recently was seen with following history.  Reviewed and updated.    The patient is a pleasant 69-year-old white male is here for evaluation of shortness of breath and chest discomfort.  Lately patient has been having significant anxiety issues.  Patient has been having exertional chest tightness and shortness of breath with activities such as going to the mailbox walking the dog etc.  Denies having any other associated aggravating or alleviating factors.     Patient is not having any palpitations, dizziness or syncope.  Denies having any headache ,abdominal pain ,nausea, vomiting , diarrhea constipation, loss of weight or loss of appetite.  Denies having any excessive bruising ,hematuria or blood in the stool.     Review of all systems negative except as indicated.     Reviewed ROS.  Assessment and Plan      ]]]]]]]]]]]]]]]]]]]  Impression  ========  - Exertional chest tightness and shortness of breath-suggestive of angina pectoris.     - Left bundle branch block-new since 8/1/2020.    - Cardiomyopathy-ischemic versus nonischemic    Stress Cardiolite test 6/11/2024   Lexiscan Cardiolite test is abnormal with inferior ischemia.     Gated SPECT images revealed left ventricular enlargement with diffuse hypocontractility with ejection fraction of 36%.    Echocardiogram 6/11/2024  Moderate mitral mild tricuspid and pulmonic regurgitation is present.  Left atrial enlargement..  Left ventricle is enlarged with paradoxical septal motion (left bundle branch block) and severe and diffuse hypocontractility with ejection fraction of 20 to 25%.  Grade 1 left ventricular diastolic dysfunction is present.  Right ventricle is normal in size with hypocontractility.  TAPSE 1.3  cm..  Mild pulmonary hypertension.    - Hypertension dyslipidemia     - Status post cholecystectomy shoulder surgery and carpal tunnel surgery.     - Family history negative for coronary artery disease     - Former smoker     - No known allergies  ===========  Plan  ==========  Patient has exertional chest tightness and shortness of breath suggestive of angina pectoris.  EKG showed sinus rhythm premature ventricular contractions left bundle branch block.  Chest x-ray-normal-4/3/2024.     Left bundle branch block-new since 8/1/2020.    Cardiomyopathy-ischemic versus nonischemic     Stress Cardiolite test-as above  Echocardiogram-as above    Patient was educated regarding the results of the testing.     Hypertension  Blood pressure 122/80     Dyslipidemia-diet controlled.    Patient was advised right and left heart catheterization.  Patient was asked to take aspirin.  Patient is on lisinopril.  Patient likely would need GDMT.  Risks and benefits pros and cons of the procedure including infection bleeding blood clot heart attack stroke allergic reaction to the dye renal dysfunction etc. were discussed.      Further plan will depend on patient's progress.    Reviewed and updated.  ]]]]]]]]]]]]]]]]]]]          Diagnosis Plan   1. Abnormal nuclear stress test  Case Request Cath Lab: Left and Right Heart Cath with Coronary Angiography    CBC (No Diff)    Basic Metabolic Panel    Protime-INR    Lipid Panel    ECG 12 Lead    XR Chest 2 View      2. Shortness of breath  Case Request Cath Lab: Left and Right Heart Cath with Coronary Angiography    CBC (No Diff)    Basic Metabolic Panel    Protime-INR    Lipid Panel    ECG 12 Lead    XR Chest 2 View      3. Essential hypertension  Case Request Cath Lab: Left and Right Heart Cath with Coronary Angiography    CBC (No Diff)    Basic Metabolic Panel    Protime-INR    Lipid Panel    ECG 12 Lead    XR Chest 2 View      4. Chest discomfort  Case Request Cath Lab: Left and Right Heart  Cath with Coronary Angiography    CBC (No Diff)    Basic Metabolic Panel    Protime-INR    Lipid Panel    ECG 12 Lead    XR Chest 2 View      5. LBBB (left bundle branch block)  Case Request Cath Lab: Left and Right Heart Cath with Coronary Angiography    CBC (No Diff)    Basic Metabolic Panel    Protime-INR    Lipid Panel    ECG 12 Lead    XR Chest 2 View      LAB RESULTS (LAST 7 DAYS)    CBC        BMP        CMP         BNP        TROPONIN        CoAg        Creatinine Clearance  CrCl cannot be calculated (Patient's most recent lab result is older than the maximum 30 days allowed.).    ABG        Radiology  No radiology results for the last day                The following portions of the patient's history were reviewed and updated as appropriate: allergies, current medications, past family history, past medical history, past social history, past surgical history, and problem list.    Review of Systems   Constitutional: Negative for malaise/fatigue.   Cardiovascular:  Negative for chest pain, leg swelling, palpitations and syncope.   Respiratory:  Negative for shortness of breath.    Skin:  Negative for rash.   Gastrointestinal:  Negative for nausea and vomiting.   Neurological:  Negative for dizziness, light-headedness and numbness.   All other systems reviewed and are negative.        Current Outpatient Medications:     albuterol sulfate  (90 Base) MCG/ACT inhaler, Inhale 2 puffs every 4 hours when necessary wheeze, dyspnea, cough, Disp: 1 inhaler, Rfl: 0    busPIRone (BUSPAR) 10 MG tablet, , Disp: , Rfl:     escitalopram (LEXAPRO) 5 MG tablet, Take 1 tablet by mouth Daily., Disp: , Rfl:     lisinopril (PRINIVIL,ZESTRIL) 20 MG tablet, Take 1 tablet by mouth Every Morning., Disp: , Rfl:   No current facility-administered medications for this visit.    No Known Allergies    History reviewed. No pertinent family history.    Past Surgical History:   Procedure Laterality Date    CARPAL TUNNEL RELEASE       "CHOLECYSTECTOMY      SHOULDER SURGERY         Past Medical History:   Diagnosis Date    Anxiety     Arthritis     Hypertension        History reviewed. No pertinent family history.    Social History     Socioeconomic History    Marital status: Single   Tobacco Use    Smoking status: Former     Passive exposure: Past    Smokeless tobacco: Never   Vaping Use    Vaping status: Never Used   Substance and Sexual Activity    Alcohol use: Yes     Comment: occasional    Drug use: Never    Sexual activity: Defer         Procedures      Objective:       Physical Exam    /80   Pulse 70   Ht 177.8 cm (70\")   Wt 84.4 kg (186 lb)   BMI 26.69 kg/m²   The patient is alert, oriented and in no distress.    Vital signs as noted above.    Head and neck revealed no carotid bruits or jugular venous distension.  No thyromegaly or lymphadenopathy is present.    Lungs clear.  No wheezing.  Breath sounds are normal bilaterally.    Heart normal first and second heart sounds.  No murmur..  No pericardial rub is present.  No gallop is present.    Abdomen soft and nontender.  No organomegaly is present.    Extremities revealed good peripheral pulses without any pedal edema.    Skin warm and dry.    Musculoskeletal system is grossly normal.    CNS grossly normal.    Reviewed and updated.        "

## 2024-06-17 ENCOUNTER — APPOINTMENT (OUTPATIENT)
Dept: CARDIOLOGY | Facility: HOSPITAL | Age: 69
End: 2024-06-17
Payer: COMMERCIAL

## 2024-06-17 ENCOUNTER — HOSPITAL ENCOUNTER (OUTPATIENT)
Dept: GENERAL RADIOLOGY | Facility: HOSPITAL | Age: 69
Discharge: HOME | End: 2024-06-17
Payer: COMMERCIAL

## 2024-06-17 ENCOUNTER — LAB (OUTPATIENT)
Dept: LAB | Facility: HOSPITAL | Age: 69
End: 2024-06-17
Payer: COMMERCIAL

## 2024-06-17 DIAGNOSIS — R94.39 ABNORMAL NUCLEAR STRESS TEST: ICD-10-CM

## 2024-06-17 DIAGNOSIS — I10 ESSENTIAL HYPERTENSION: ICD-10-CM

## 2024-06-17 DIAGNOSIS — R07.89 CHEST DISCOMFORT: ICD-10-CM

## 2024-06-17 DIAGNOSIS — I44.7 LBBB (LEFT BUNDLE BRANCH BLOCK): ICD-10-CM

## 2024-06-17 DIAGNOSIS — R06.02 SHORTNESS OF BREATH: ICD-10-CM

## 2024-06-17 LAB
ANION GAP SERPL CALCULATED.3IONS-SCNC: 8.6 MMOL/L (ref 5–15)
BUN SERPL-MCNC: 23 MG/DL (ref 8–23)
BUN/CREAT SERPL: 22.1 (ref 7–25)
CALCIUM SPEC-SCNC: 9.3 MG/DL (ref 8.6–10.5)
CHLORIDE SERPL-SCNC: 108 MMOL/L (ref 98–107)
CHOLEST SERPL-MCNC: 236 MG/DL (ref 0–200)
CO2 SERPL-SCNC: 24.4 MMOL/L (ref 22–29)
CREAT SERPL-MCNC: 1.04 MG/DL (ref 0.76–1.27)
DEPRECATED RDW RBC AUTO: 47.6 FL (ref 37–54)
EGFRCR SERPLBLD CKD-EPI 2021: 76.8 ML/MIN/1.73
ERYTHROCYTE [DISTWIDTH] IN BLOOD BY AUTOMATED COUNT: 14.8 % (ref 12.3–15.4)
GLUCOSE SERPL-MCNC: 120 MG/DL (ref 65–99)
HCT VFR BLD AUTO: 41.1 % (ref 37.5–51)
HDLC SERPL-MCNC: 64 MG/DL (ref 40–60)
HGB BLD-MCNC: 12.9 G/DL (ref 13–17.7)
INR PPP: 0.99 (ref 0.93–1.1)
LDLC SERPL CALC-MCNC: 151 MG/DL (ref 0–100)
LDLC/HDLC SERPL: 2.32 {RATIO}
MCH RBC QN AUTO: 27.4 PG (ref 26.6–33)
MCHC RBC AUTO-ENTMCNC: 31.4 G/DL (ref 31.5–35.7)
MCV RBC AUTO: 87.3 FL (ref 79–97)
PLATELET # BLD AUTO: 230 10*3/MM3 (ref 140–450)
PMV BLD AUTO: 9.8 FL (ref 6–12)
POTASSIUM SERPL-SCNC: 4.3 MMOL/L (ref 3.5–5.2)
PROTHROMBIN TIME: 10.8 SECONDS (ref 9.6–11.7)
RBC # BLD AUTO: 4.71 10*6/MM3 (ref 4.14–5.8)
SODIUM SERPL-SCNC: 141 MMOL/L (ref 136–145)
TRIGL SERPL-MCNC: 119 MG/DL (ref 0–150)
VLDLC SERPL-MCNC: 21 MG/DL (ref 5–40)
WBC NRBC COR # BLD AUTO: 5.46 10*3/MM3 (ref 3.4–10.8)

## 2024-06-17 PROCEDURE — 80061 LIPID PANEL: CPT

## 2024-06-17 PROCEDURE — 85610 PROTHROMBIN TIME: CPT

## 2024-06-17 PROCEDURE — 71046 X-RAY EXAM CHEST 2 VIEWS: CPT

## 2024-06-17 PROCEDURE — 85027 COMPLETE CBC AUTOMATED: CPT

## 2024-06-17 PROCEDURE — 80048 BASIC METABOLIC PNL TOTAL CA: CPT

## 2024-06-17 PROCEDURE — 36415 COLL VENOUS BLD VENIPUNCTURE: CPT

## 2024-06-19 ENCOUNTER — HOSPITAL ENCOUNTER (OUTPATIENT)
Facility: HOSPITAL | Age: 69
Setting detail: HOSPITAL OUTPATIENT SURGERY
Discharge: HOME | End: 2024-06-19
Attending: INTERNAL MEDICINE | Admitting: INTERNAL MEDICINE
Payer: COMMERCIAL

## 2024-06-19 ENCOUNTER — APPOINTMENT (OUTPATIENT)
Dept: CARDIOLOGY | Facility: HOSPITAL | Age: 69
End: 2024-06-19
Payer: COMMERCIAL

## 2024-06-19 VITALS
SYSTOLIC BLOOD PRESSURE: 131 MMHG | HEART RATE: 79 BPM | BODY MASS INDEX: 27.7 KG/M2 | OXYGEN SATURATION: 97 % | WEIGHT: 182.76 LBS | TEMPERATURE: 98.2 F | DIASTOLIC BLOOD PRESSURE: 76 MMHG | HEIGHT: 68 IN | RESPIRATION RATE: 15 BRPM

## 2024-06-19 DIAGNOSIS — I10 ESSENTIAL HYPERTENSION: ICD-10-CM

## 2024-06-19 DIAGNOSIS — R94.39 ABNORMAL NUCLEAR STRESS TEST: ICD-10-CM

## 2024-06-19 DIAGNOSIS — R06.02 SHORTNESS OF BREATH: ICD-10-CM

## 2024-06-19 DIAGNOSIS — I44.7 LBBB (LEFT BUNDLE BRANCH BLOCK): ICD-10-CM

## 2024-06-19 DIAGNOSIS — R07.89 CHEST DISCOMFORT: ICD-10-CM

## 2024-06-19 LAB
ARTERIAL PATENCY WRIST A: ABNORMAL
ATMOSPHERIC PRESS: ABNORMAL MM[HG]
BASE EXCESS BLDA CALC-SCNC: 0 MMOL/L (ref 0–3)
BDY SITE: ABNORMAL
BH CV XLRA MEAS - DIST GSV CALF DIST LEFT: 0.25 CM
BH CV XLRA MEAS - DIST GSV CALF DIST RIGHT: 0.27 CM
BH CV XLRA MEAS - DIST GSV THIGH DIST LEFT: 0.35 CM
BH CV XLRA MEAS - DIST GSV THIGH DIST RIGHT: 0.49 CM
BH CV XLRA MEAS - MID GSV CALF LEFT: 0.3 CM
BH CV XLRA MEAS - MID GSV CALF RIGHT: 0.22 CM
BH CV XLRA MEAS - MID GSV THIGH  LEFT: 0.42 CM
BH CV XLRA MEAS - MID GSV THIGH  RIGHT: 0.46 CM
BH CV XLRA MEAS - PROX GSV CALF DIST LEFT: 0.26 CM
BH CV XLRA MEAS - PROX GSV CALF DIST RIGHT: 0.39 CM
BH CV XLRA MEAS - PROX GSV THIGH  LEFT: 0.4 CM
BH CV XLRA MEAS - PROX GSV THIGH  RIGHT: 0.51 CM
BH CV XLRA MEAS LEFT DIST CCA EDV: 23.8 CM/SEC
BH CV XLRA MEAS LEFT DIST CCA PSV: 90.4 CM/SEC
BH CV XLRA MEAS LEFT DIST ICA EDV: -50.8 CM/SEC
BH CV XLRA MEAS LEFT DIST ICA PSV: -108 CM/SEC
BH CV XLRA MEAS LEFT ICA/CCA RATIO: -0.87
BH CV XLRA MEAS LEFT PROX CCA EDV: 40.7 CM/SEC
BH CV XLRA MEAS LEFT PROX CCA PSV: 148 CM/SEC
BH CV XLRA MEAS LEFT PROX ECA PSV: -81.6 CM/SEC
BH CV XLRA MEAS LEFT PROX ICA EDV: -50.4 CM/SEC
BH CV XLRA MEAS LEFT PROX ICA PSV: -129 CM/SEC
BH CV XLRA MEAS LEFT PROX SCLA PSV: 147 CM/SEC
BH CV XLRA MEAS LEFT VERTEBRAL A EDV: 22 CM/SEC
BH CV XLRA MEAS LEFT VERTEBRAL A PSV: 50 CM/SEC
BH CV XLRA MEAS RIGHT DIST CCA EDV: 21.1 CM/SEC
BH CV XLRA MEAS RIGHT DIST CCA PSV: 77.7 CM/SEC
BH CV XLRA MEAS RIGHT DIST ICA EDV: -37.9 CM/SEC
BH CV XLRA MEAS RIGHT DIST ICA PSV: -106 CM/SEC
BH CV XLRA MEAS RIGHT ICA/CCA RATIO: 1.1
BH CV XLRA MEAS RIGHT MID ICA EDV: -47.8 CM/SEC
BH CV XLRA MEAS RIGHT MID ICA PSV: -111 CM/SEC
BH CV XLRA MEAS RIGHT PROX CCA EDV: -22.4 CM/SEC
BH CV XLRA MEAS RIGHT PROX CCA PSV: -101 CM/SEC
BH CV XLRA MEAS RIGHT PROX ECA PSV: -75.8 CM/SEC
BH CV XLRA MEAS RIGHT PROX ICA EDV: -32.9 CM/SEC
BH CV XLRA MEAS RIGHT PROX ICA PSV: -103 CM/SEC
BH CV XLRA MEAS RIGHT PROX SCLA PSV: 107 CM/SEC
BH CV XLRA MEAS RIGHT VERTEBRAL A EDV: 16.8 CM/SEC
BH CV XLRA MEAS RIGHT VERTEBRAL A PSV: 52.8 CM/SEC
CA-I BLDA-SCNC: 1.21 MMOL/L (ref 1.15–1.33)
CREAT BLDA-MCNC: 0.88 MG/DL (ref 0.6–1.3)
D-LACTATE SERPL-SCNC: 0.6 MMOL/L (ref 0.2–2)
EGFRCR SERPLBLD CKD-EPI 2021: 91.9 ML/MIN/1.73
GLUCOSE BLDC GLUCOMTR-MCNC: 113 MG/DL (ref 74–100)
GLUCOSE BLDC GLUCOMTR-MCNC: 113 MG/DL (ref 74–100)
HCO3 BLDA-SCNC: 25.2 MMOL/L (ref 21–28)
HCT VFR BLDA CALC: 38 % (ref 38–51)
HEMODILUTION: NO
HGB BLDA-MCNC: 12.8 G/DL (ref 12–17)
INHALED O2 CONCENTRATION: 21 %
MODALITY: ABNORMAL
PCO2 BLDA: 42 MM HG (ref 35–48)
PH BLDA: 7.39 PH UNITS (ref 7.35–7.45)
PO2 BLD: 380 MM[HG] (ref 0–500)
PO2 BLDA: 79.7 MM HG (ref 83–108)
POTASSIUM BLDA-SCNC: 4.1 MMOL/L (ref 3.5–4.5)
SAO2 % BLDCOA: 95.5 % (ref 94–98)
SODIUM BLD-SCNC: 144 MMOL/L (ref 138–146)

## 2024-06-19 PROCEDURE — C1894 INTRO/SHEATH, NON-LASER: HCPCS | Performed by: INTERNAL MEDICINE

## 2024-06-19 PROCEDURE — 99152 MOD SED SAME PHYS/QHP 5/>YRS: CPT | Performed by: INTERNAL MEDICINE

## 2024-06-19 PROCEDURE — 93970 EXTREMITY STUDY: CPT | Performed by: STUDENT IN AN ORGANIZED HEALTH CARE EDUCATION/TRAINING PROGRAM

## 2024-06-19 PROCEDURE — 93880 EXTRACRANIAL BILAT STUDY: CPT

## 2024-06-19 PROCEDURE — 93970 EXTREMITY STUDY: CPT

## 2024-06-19 PROCEDURE — C1769 GUIDE WIRE: HCPCS | Performed by: INTERNAL MEDICINE

## 2024-06-19 PROCEDURE — 93880 EXTRACRANIAL BILAT STUDY: CPT | Performed by: STUDENT IN AN ORGANIZED HEALTH CARE EDUCATION/TRAINING PROGRAM

## 2024-06-19 PROCEDURE — 80051 ELECTROLYTE PANEL: CPT

## 2024-06-19 PROCEDURE — 82565 ASSAY OF CREATININE: CPT

## 2024-06-19 PROCEDURE — 82948 REAGENT STRIP/BLOOD GLUCOSE: CPT

## 2024-06-19 PROCEDURE — 25010000002 MIDAZOLAM PER 1 MG: Performed by: INTERNAL MEDICINE

## 2024-06-19 PROCEDURE — 25810000003 SODIUM CHLORIDE 0.9 % SOLUTION: Performed by: INTERNAL MEDICINE

## 2024-06-19 PROCEDURE — 82330 ASSAY OF CALCIUM: CPT

## 2024-06-19 PROCEDURE — 82803 BLOOD GASES ANY COMBINATION: CPT

## 2024-06-19 PROCEDURE — 93460 R&L HRT ART/VENTRICLE ANGIO: CPT | Performed by: INTERNAL MEDICINE

## 2024-06-19 PROCEDURE — 85018 HEMOGLOBIN: CPT

## 2024-06-19 PROCEDURE — 99153 MOD SED SAME PHYS/QHP EA: CPT | Performed by: INTERNAL MEDICINE

## 2024-06-19 PROCEDURE — 25010000002 FENTANYL CITRATE (PF) 100 MCG/2ML SOLUTION: Performed by: INTERNAL MEDICINE

## 2024-06-19 PROCEDURE — 25510000001 IOPAMIDOL PER 1 ML: Performed by: INTERNAL MEDICINE

## 2024-06-19 PROCEDURE — 83605 ASSAY OF LACTIC ACID: CPT

## 2024-06-19 RX ORDER — SODIUM CHLORIDE 9 MG/ML
250 INJECTION, SOLUTION INTRAVENOUS ONCE AS NEEDED
Status: DISCONTINUED | OUTPATIENT
Start: 2024-06-19 | End: 2024-06-19 | Stop reason: HOSPADM

## 2024-06-19 RX ORDER — ACETAMINOPHEN 325 MG/1
650 TABLET ORAL EVERY 4 HOURS PRN
Status: DISCONTINUED | OUTPATIENT
Start: 2024-06-19 | End: 2024-06-19 | Stop reason: HOSPADM

## 2024-06-19 RX ORDER — MIDAZOLAM HYDROCHLORIDE 1 MG/ML
INJECTION INTRAMUSCULAR; INTRAVENOUS
Status: DISCONTINUED | OUTPATIENT
Start: 2024-06-19 | End: 2024-06-19 | Stop reason: HOSPADM

## 2024-06-19 RX ORDER — FENTANYL CITRATE 50 UG/ML
INJECTION, SOLUTION INTRAMUSCULAR; INTRAVENOUS
Status: DISCONTINUED | OUTPATIENT
Start: 2024-06-19 | End: 2024-06-19 | Stop reason: HOSPADM

## 2024-06-19 RX ORDER — DIPHENHYDRAMINE HCL 25 MG
25 CAPSULE ORAL EVERY 6 HOURS PRN
Status: DISCONTINUED | OUTPATIENT
Start: 2024-06-19 | End: 2024-06-19 | Stop reason: HOSPADM

## 2024-06-19 RX ORDER — CARVEDILOL 3.12 MG/1
3.12 TABLET ORAL 2 TIMES DAILY WITH MEALS
Status: DISCONTINUED | OUTPATIENT
Start: 2024-06-19 | End: 2024-06-19 | Stop reason: HOSPADM

## 2024-06-19 RX ORDER — ISOSORBIDE MONONITRATE 60 MG/1
60 TABLET, EXTENDED RELEASE ORAL
Status: DISCONTINUED | OUTPATIENT
Start: 2024-06-19 | End: 2024-06-19 | Stop reason: HOSPADM

## 2024-06-19 RX ORDER — LIDOCAINE HYDROCHLORIDE 20 MG/ML
INJECTION, SOLUTION INFILTRATION; PERINEURAL
Status: DISCONTINUED | OUTPATIENT
Start: 2024-06-19 | End: 2024-06-19 | Stop reason: HOSPADM

## 2024-06-19 RX ORDER — ASPIRIN 81 MG/1
81 TABLET ORAL DAILY
Status: DISCONTINUED | OUTPATIENT
Start: 2024-06-19 | End: 2024-06-19 | Stop reason: HOSPADM

## 2024-06-19 RX ORDER — ONDANSETRON 2 MG/ML
4 INJECTION INTRAMUSCULAR; INTRAVENOUS EVERY 6 HOURS PRN
Status: DISCONTINUED | OUTPATIENT
Start: 2024-06-19 | End: 2024-06-19 | Stop reason: HOSPADM

## 2024-06-19 RX ORDER — NITROGLYCERIN 0.4 MG/1
0.4 TABLET SUBLINGUAL
Status: DISCONTINUED | OUTPATIENT
Start: 2024-06-19 | End: 2024-06-19 | Stop reason: HOSPADM

## 2024-06-19 RX ORDER — SODIUM CHLORIDE 9 MG/ML
INJECTION, SOLUTION INTRAVENOUS
Status: COMPLETED | OUTPATIENT
Start: 2024-06-19 | End: 2024-06-19

## 2024-06-19 RX ORDER — ONDANSETRON 4 MG/1
4 TABLET, ORALLY DISINTEGRATING ORAL EVERY 6 HOURS PRN
Status: DISCONTINUED | OUTPATIENT
Start: 2024-06-19 | End: 2024-06-19 | Stop reason: HOSPADM

## 2024-06-19 NOTE — CONSULTS
Patient Care Team:  Xavier Walden MD as PCP - General (Internal Medicine)    Chief complaint: Chest pain    Subjective     History of Present Illness:   Mr. Diaz is a 69 year-old male with PMH of HTN, HLD, arthritis, and anxiety who was recently seen in the office by Dr. Pettit on 6/11/24 with a chief complaint of chest pain. Patient describes the chest pain as a tightness that occurs with exertion and is associated with shortness of breath. He denies palpitations, nausea, vomiting, dizziness, and syncope. EKG in the office revealed a new left bundle branch block. A stress test and echocardiogram were ordered revealing EF 20-25%, moderate mitral regurgitation, and findings suggestive of inferior wall ischemia. Patient presented today for elective cardiac catheterization demonstrating EF 40%, no mitral regurgitation, and severe multivessel CAD. Cardiac Surgery has been consulted for surgical evaluation.      Review of Systems   Respiratory:  Positive for chest tightness and shortness of breath.    Cardiovascular:  Positive for chest pain. Negative for palpitations.   Neurological:  Negative for dizziness, syncope and light-headedness.   All other systems reviewed and are negative.       Past Medical History:   Diagnosis Date    Anxiety     Arthritis     Hypertension      Past Surgical History:   Procedure Laterality Date    CARPAL TUNNEL RELEASE      CHOLECYSTECTOMY      SHOULDER SURGERY       History reviewed. No pertinent family history.  Social History     Tobacco Use    Smoking status: Former     Passive exposure: Past    Smokeless tobacco: Never   Vaping Use    Vaping status: Never Used   Substance Use Topics    Alcohol use: Yes     Comment: occasional    Drug use: Never     Medications Prior to Admission   Medication Sig Dispense Refill Last Dose    escitalopram (LEXAPRO) 5 MG tablet Take 1 tablet by mouth Daily.   6/18/2024 at 0900    lisinopril (PRINIVIL,ZESTRIL) 20 MG tablet Take 1 tablet by mouth  "Every Morning.   6/18/2024 at 0900       Allergies:  Patient has no known allergies.    Objective      Vital Signs  Temp:  [98.2 °F (36.8 °C)] 98.2 °F (36.8 °C)  Heart Rate:  [72-87] 72  Resp:  [10-16] 13  BP: (117-142)/(68-88) 117/68    Flowsheet Rows      Flowsheet Row First Filed Value   Admission Height 171.5 cm (67.5\") Documented at 06/19/2024 0748   Admission Weight 82.9 kg (182 lb 12.2 oz) Documented at 06/19/2024 0748          171.5 cm (67.5\")    Physical Exam  Vitals and nursing note reviewed.   Constitutional:       General: He is not in acute distress.     Appearance: Normal appearance. He is normal weight. He is not ill-appearing, toxic-appearing or diaphoretic.      Comments: Resting in bed with daughters at bedside   HENT:      Head: Normocephalic and atraumatic.      Nose: Nose normal.      Mouth/Throat:      Mouth: Mucous membranes are moist. Mucous membranes are dry.      Pharynx: Oropharynx is clear.   Eyes:      Extraocular Movements: Extraocular movements intact.      Conjunctiva/sclera: Conjunctivae normal.      Pupils: Pupils are equal, round, and reactive to light.      Comments: Wears glasses   Cardiovascular:      Rate and Rhythm: Normal rate and regular rhythm.      Heart sounds: No murmur heard.  Pulmonary:      Effort: Pulmonary effort is normal. No respiratory distress.      Breath sounds: Normal breath sounds. No wheezing or rales.   Abdominal:      General: Bowel sounds are normal.      Palpations: Abdomen is soft.   Musculoskeletal:         General: Normal range of motion.      Cervical back: Normal range of motion and neck supple.      Right lower leg: No edema.      Left lower leg: No edema.   Skin:     General: Skin is warm and dry.      Comments: R groin lipoma    Neurological:      General: No focal deficit present.      Mental Status: He is alert and oriented to person, place, and time.   Psychiatric:         Mood and Affect: Mood normal.         Behavior: Behavior normal. "         Results Review:   Lab Results (last 24 hours)       ** No results found for the last 24 hours. **          CARDIAC CATHETERIZATION 6/19/24:  1. HEMODYNAMICS:  Left ventricle end-diastolic pressure is normal.  No gradient was noted across the aortic valve.  Mean right atrial pressure of 5 right ventricle 29/4 pulmonary artery 28/9 mean pulmonary artery 18 pulmonary capital wedge pressure is 10.  No gradient was noted across the aortic valve.        2. LEFT VENTRICULOGRAPHY:  Left ventricle is normal in size with inferior wall and apical hypokinesis with estimated ejection fraction of 40%.  No mitral regurgitation was seen.        3. CORONARY ANGIOGRAPHY:  Left main coronary artery is normal.  Left anterior descending artery has mid segment 80% disease.  Distal vessel is small in caliber.  Circumflex coronary artery is a small caliber vessel that has 99% disease in the midsegment.  Right coronary artery is a large and dominant vessel that has multiple areas of 95 to 99% disease.    TRANSTHORACIC ECHOCARDIOGRAM 6/11/24:  Moderate mitral mild tricuspid and pulmonic regurgitation is present.  Left atrial enlargement..  Left ventricle is enlarged with paradoxical septal motion (left bundle branch block) and severe and diffuse hypocontractility with ejection fraction of 20 to 25%.  Grade 1 left ventricular diastolic dysfunction is present.  Right ventricle is normal in size with hypocontractility.  TAPSE 1.3 cm..  Mild pulmonary hypertension.        Assessment & Plan    Severe multivessel CAD with stable angina  Depressed LV systolic function---EF 20-25% (echo), 40% (cath)  LBBB  HTN---on lisinopril   HLD---diet controlled   Arthritis  Anxiety---on lexapro   Remote hx of tobacco abuse---quit 1999   Right groin lipoma       Work-up in progress for possible CABG. Surgeon to review films and make further recommendations. Will check carotid duplex, vein mapping, and room air ABG.      Leann Cason,  PA  06/19/24  10:58 EDT

## 2024-06-19 NOTE — DISCHARGE INSTRUCTIONS
Post Cath Instructions  Drink plenty of fluids for the next 24 hours.  This helps to eliminate the dye used in your procedure through urination.  You may resume a normal diet; however, try to avoid foods that would cause gas or constipation.    Sedative medication given to you during your catheterization may decrease your judgement and reaction time for up to 24-48 hours.  Therefore:  DO NOT drive or operate hazardous machinery for 24 hours.   DO NOT consume alcoholic beverages for 24 hours.  DO NOT make any important/legal decisions for 24 hours.   Have someone stay with you for at least 24 hours.    For the next 48 hours (to allow proper healing and prevent bleeding):  Avoid excessive bending at wound site  Avoid straining (anything that would tense up muscles around the affected puncture site)  Avoid lifting objects greater than 5 pounds, pushing, or pulling for 5 days  For Groin Cases:  Refrain from sexual activity  Refrain from running or vigorous walking  No prolonged sitting or standing  Limit stair climbing as much as possible    Keep the puncture site clean and dry.  After 24 hours, remove the dressing and replace it with a Band-Aid for at least one additional day.  Gently clean the site with mild soap and water.  No scrubbing/rubbing, and lightly pat the area dry.  Showers are acceptable; however, avoid submerging in water (tub baths, hot tubs, swimming pools, dishwater, etc…) for at least one week.  The site should be completely healed before resuming these activities to reduce the risk of infection.  Check the site often.  Watch for signs and symptoms of infection and notify your physician if any of the following occur:  Bleeding or an increase in swelling at the puncture site  Fever  Increased soreness around puncture site  Foul odor or significant drainage from the puncture site  Swelling, redness, or warmth at the puncture site  **A bruise or small “pea sized” lump under the skin at the puncture site  is not unusual.  This should disappear within 3-4 weeks.**  CONTACT YOUR PHYSICIAN OR CALL 911 IF YOU EXPERIENCE ANY OF THE FOLLOWING:  Increased angina (chest pain) or frequent sensations of pressure, burning, pain, or other discomfort in the chest, arm, jaws, or stomach  Lightheadedness, dizziness, faint feeling, sweating, or difficulty breathing  Odd sensation changes like numbness, tingling, coldness, or pain in the arm or leg in which the catheter was inserted  Limb in which the catheter was inserted becomes pale/bluish in color    IMPORTANT:  Although this occurs very rarely, if you should develop bright red or excessive bleeding, feel a “pop” inside at the insertion site, or notice a sudden increase in swelling larger than a walnut, you should call 911.  Hold continuous firm pressure to the access site until emergency personnel arrive.  It is best if someone else can do this for you.

## 2024-06-20 ENCOUNTER — TELEPHONE (OUTPATIENT)
Dept: CARDIOLOGY | Facility: CLINIC | Age: 69
End: 2024-06-20
Payer: COMMERCIAL

## 2024-06-20 NOTE — TELEPHONE ENCOUNTER
Cassandra has question on Entresto. Pharmacy told her patients needs to be off lisinopril 48 hrs before starting the Entresto. Cassandra was told at hospital he needed to continue taking the lisinopril.

## 2024-06-20 NOTE — TELEPHONE ENCOUNTER
Spoke with jennifer - advised per dr. Pettit, karin needs to stop the lisinopril and start the entresto.   Understood

## 2024-06-21 ENCOUNTER — TELEPHONE (OUTPATIENT)
Dept: CARDIOLOGY | Facility: CLINIC | Age: 69
End: 2024-06-21
Payer: COMMERCIAL

## 2024-06-21 NOTE — TELEPHONE ENCOUNTER
"\"i have a dumb question i know the surgeon told us he needed to limit activity and no walking my fathers dog and he could play music but we didnt say that his music playing was outside in this crazy heat and standing for several hours and we wanted to see if this was okay or if he should take a break from it till after he has his surgery?\"    Please advise patients question...     Admission (Discharged) with Peg Pettit MD (06/19/2024)   Office Visit with Peg Pettit MD (06/11/2024)   "

## 2024-06-21 NOTE — TELEPHONE ENCOUNTER
He should take a break from activities in the extreme weather.  He can take short walks outside when the weather is cooler.

## 2024-07-01 NOTE — PROGRESS NOTES
Encounter Date:07/03/2024  Last seen seen 6/11/2024      Patient ID: Sudhir Diaz is a 69 y.o. male.    Chief Complaint:  Chest discomfort  Shortness of breath  Left bundle branch block  Hypertension  Dyslipidemia     History of present illness  Patient recently was admitted to the hospital and had cardiac catheterization.  Patient was found to have significant coronary artery disease and was released home.  Cardiovascular surgery consultation was obtained.  Patient has an appointment to see Dr. Sanon on 7/24/2024.    Since I have last seen, the patient has been without any chest discomfort , unusual shortness of breath, palpitations, dizziness or syncope.  Denies having any headache ,abdominal pain ,nausea, vomiting , diarrhea constipation, loss of weight or loss of appetite.  Denies having any excessive bruising ,hematuria or blood in the stool.    Review of all systems negative except as indicated.    Reviewed ROS.  Assessment and Plan      ]]]]]]]]]]]]]]]]]]]  Impression  ========  - Exertional chest tightness and shortness of breath-suggestive of angina pectoris.     - Left bundle branch block-new since 8/1/2020.     - Cardiomyopathy-ischemic versus nonischemic    Cardiac catheterization 6/19/2024   No evidence for pulmonary hypertension is seen.     Left ventricle is normal in size with inferior wall and apical hypokinesis with estimated ejection fraction of 40%.  No mitral regurgitation was seen.     Left main coronary artery is normal.  Left anterior descending artery has mid segment 80% disease.  Distal vessel is small in caliber.  Circumflex coronary artery is a small caliber vessel that has 99% disease in the midsegment.  Right coronary artery is a large and dominant vessel that has multiple areas of 95 to 99% disease.     RECOMMENDATIONS:  CABG.  Cardiovascular surgery consultation initiated.  Patient was on aspirin Imdur and Coreg.    Stress Cardiolite test 6/11/2024   Lexiscan Cardiolite test is  abnormal with inferior ischemia.     Gated SPECT images revealed left ventricular enlargement with diffuse hypocontractility with ejection fraction of 36%.     Echocardiogram 6/11/2024  Moderate mitral mild tricuspid and pulmonic regurgitation is present.  Left atrial enlargement..  Left ventricle is enlarged with paradoxical septal motion (left bundle branch block) and severe and diffuse hypocontractility with ejection fraction of 20 to 25%.  Grade 1 left ventricular diastolic dysfunction is present.  Right ventricle is normal in size with hypocontractility.  TAPSE 1.3 cm..  Mild pulmonary hypertension.     - Hypertension dyslipidemia    - Large right groin lipoma.     - Status post cholecystectomy shoulder surgery and carpal tunnel surgery.     - Family history negative for coronary artery disease     - Former smoker     - No known allergies  ===========  Plan  ==========  Patient has exertional chest tightness and shortness of breath suggestive of angina pectoris.  EKG showed sinus rhythm premature ventricular contractions left bundle branch block.  Chest x-ray-normal-4/3/2024.     Left bundle branch block-new since 8/1/2020.     Ischemic cardiomyopathy    Severe coronary artery disease  Moderate mitral regurgitation    Patient has an appointment to see Dr. Sanon on 7/24/2024.    Cardiac catheterization 6/19/2024  No evidence for pulmonary hypertension is seen.     Left ventricle is normal in size with inferior wall and apical hypokinesis with estimated ejection fraction of 40%.  No mitral regurgitation was seen.     Left main coronary artery is normal.  Left anterior descending artery has mid segment 80% disease.  Distal vessel is small in caliber.  Circumflex coronary artery is a small caliber vessel that has 99% disease in the midsegment.  Right coronary artery is a large and dominant vessel that has multiple areas of 95 to 99% disease.     Hypertension  Blood pressure 105/46    Dyslipidemia-started Lipitor  10 mg p.o. nightly.  Increase the dose depending on patient's tolerance and response..     Patient is going to have surgery performed at [facility] (both his daughters work over there)    Follow-up in the office after cardiac surgery.    Further plan will depend on patient's progress.     Reviewed and updated.  7/3/2024.  ]]]]]]]]]]]]]]]]]]]            Diagnosis Plan   1. Shortness of breath        2. Essential hypertension        3. Chest discomfort        4. LBBB (left bundle branch block)        5. Abnormal nuclear stress test        LAB RESULTS (LAST 7 DAYS)    CBC        BMP        CMP         BNP        TROPONIN        CoAg        Creatinine Clearance  Estimated Creatinine Clearance: 80 mL/min (by C-G formula based on SCr of 0.88 mg/dL).    ABG        Radiology  No radiology results for the last day                The following portions of the patient's history were reviewed and updated as appropriate: allergies, current medications, past family history, past medical history, past social history, past surgical history, and problem list.    Review of Systems   Constitutional: Negative for malaise/fatigue.   Cardiovascular:  Positive for leg swelling. Negative for chest pain, dyspnea on exertion and palpitations.   Respiratory:  Negative for cough and shortness of breath.    Gastrointestinal:  Negative for abdominal pain, nausea and vomiting.   Neurological:  Negative for dizziness, focal weakness, headaches, light-headedness and numbness.   All other systems reviewed and are negative.      Current Outpatient Medications:     escitalopram (LEXAPRO) 5 MG tablet, Take 1 tablet by mouth Daily., Disp: , Rfl:     lisinopril (PRINIVIL,ZESTRIL) 20 MG tablet, Take 1 tablet by mouth Every Morning., Disp: , Rfl:     No Known Allergies    No family history on file.    Past Surgical History:   Procedure Laterality Date    CARDIAC CATHETERIZATION N/A 6/19/2024    Procedure: Left and Right Heart Cath with Coronary  Angiography;  Surgeon: Peg Pettit MD;  Location: [facility] INVASIVE LOCATION;  Service: Cardiovascular;  Laterality: N/A;    CARPAL TUNNEL RELEASE      CHOLECYSTECTOMY      SHOULDER SURGERY         Past Medical History:   Diagnosis Date    Anxiety     Arthritis     Hypertension        No family history on file.    Social History     Socioeconomic History    Marital status: Single   Tobacco Use    Smoking status: Former     Passive exposure: Past    Smokeless tobacco: Never   Vaping Use    Vaping status: Never Used   Substance and Sexual Activity    Alcohol use: Yes     Comment: occasional    Drug use: Never    Sexual activity: Defer         Procedures      Objective:       Physical Exam    There were no vitals taken for this visit.  The patient is alert, oriented and in no distress.    Vital signs as noted above.    Head and neck revealed no carotid bruits or jugular venous distension.  No thyromegaly or lymphadenopathy is present.    Lungs clear.  No wheezing.  Breath sounds are normal bilaterally.    Heart normal first and second heart sounds.  No murmur..  No pericardial rub is present.  No gallop is present.    Abdomen soft and nontender.  No organomegaly is present.    Extremities revealed good peripheral pulses without any pedal edema.    Skin warm and dry.    Musculoskeletal system is grossly normal.    CNS grossly normal.    Reviewed and updated.

## 2024-07-03 ENCOUNTER — OFFICE VISIT (OUTPATIENT)
Dept: CARDIOLOGY | Facility: CLINIC | Age: 69
End: 2024-07-03
Payer: COMMERCIAL

## 2024-07-03 VITALS
SYSTOLIC BLOOD PRESSURE: 105 MMHG | DIASTOLIC BLOOD PRESSURE: 46 MMHG | HEART RATE: 67 BPM | WEIGHT: 183 LBS | OXYGEN SATURATION: 97 % | HEIGHT: 68 IN | BODY MASS INDEX: 27.74 KG/M2

## 2024-07-03 DIAGNOSIS — R07.89 CHEST DISCOMFORT: ICD-10-CM

## 2024-07-03 DIAGNOSIS — R94.39 ABNORMAL NUCLEAR STRESS TEST: ICD-10-CM

## 2024-07-03 DIAGNOSIS — R06.02 SHORTNESS OF BREATH: Primary | ICD-10-CM

## 2024-07-03 DIAGNOSIS — I10 ESSENTIAL HYPERTENSION: ICD-10-CM

## 2024-07-03 DIAGNOSIS — I44.7 LBBB (LEFT BUNDLE BRANCH BLOCK): ICD-10-CM

## 2024-07-03 RX ORDER — ATORVASTATIN CALCIUM 10 MG/1
10 TABLET, FILM COATED ORAL DAILY
Qty: 90 TABLET | Refills: 3 | Status: SHIPPED | OUTPATIENT
Start: 2024-07-03

## 2024-07-03 RX ORDER — SACUBITRIL AND VALSARTAN 24; 26 MG/1; MG/1
1 TABLET, FILM COATED ORAL EVERY 12 HOURS SCHEDULED
Qty: 56 TABLET | Refills: 0 | COMMUNITY
Start: 2024-07-03

## 2024-07-03 RX ORDER — SACUBITRIL AND VALSARTAN 24; 26 MG/1; MG/1
1 TABLET, FILM COATED ORAL EVERY 12 HOURS SCHEDULED
COMMUNITY
Start: 2024-06-19 | End: 2024-07-03 | Stop reason: SDUPTHER

## 2024-07-03 RX ORDER — ASPIRIN 81 MG/1
TABLET, COATED ORAL
COMMUNITY
Start: 2024-06-19

## 2024-07-03 RX ORDER — CARVEDILOL 3.12 MG/1
TABLET ORAL
COMMUNITY
Start: 2024-06-19

## 2024-07-03 RX ORDER — ISOSORBIDE MONONITRATE 60 MG/1
TABLET, EXTENDED RELEASE ORAL
COMMUNITY
Start: 2024-06-19

## 2024-07-03 NOTE — TELEPHONE ENCOUNTER
Rx Refill Note  Requested Prescriptions     Pending Prescriptions Disp Refills    Entresto 24-26 MG tablet 56 tablet 0     Sig: Take 1 tablet by mouth Every 12 (Twelve) Hours.      Last office visit with prescribing clinician: 7/3/2024   Last telemedicine visit with prescribing clinician: Visit date not found   Next office visit with prescribing clinician: Visit date not found                         Would you like a call back once the refill request has been completed: [] Yes [] No    If the office needs to give you a call back, can they leave a voicemail: [] Yes [] No    Ashley Peraza MA  07/03/24, 11:15 EDT

## 2024-07-22 RX ORDER — ISOSORBIDE MONONITRATE 60 MG/1
60 TABLET, EXTENDED RELEASE ORAL DAILY
Qty: 90 TABLET | Refills: 3 | Status: SHIPPED | OUTPATIENT
Start: 2024-07-22 | End: 2024-07-24 | Stop reason: SDUPTHER

## 2024-07-22 RX ORDER — CARVEDILOL 3.12 MG/1
3.12 TABLET ORAL 2 TIMES DAILY
Qty: 180 TABLET | Refills: 3 | Status: SHIPPED | OUTPATIENT
Start: 2024-07-22 | End: 2024-07-24 | Stop reason: SDUPTHER

## 2024-07-22 NOTE — TELEPHONE ENCOUNTER
Rx Refill Note  Requested Prescriptions     Pending Prescriptions Disp Refills    isosorbide mononitrate (IMDUR) 60 MG 24 hr tablet [Pharmacy Med Name: Isosorbide Mononitrate ER Oral Tablet Extended Release 24 Hour 60 MG] 90 tablet 3     Sig: TAKE 1 TABLET BY MOUTH EVERY DAY    carvedilol (COREG) 3.125 MG tablet [Pharmacy Med Name: Carvedilol Oral Tablet 3.125 MG] 180 tablet 3     Sig: TAKE 1 TABLET BY MOUTH 2 TIMES A DAY      Last office visit with prescribing clinician: 7/3/2024   Last telemedicine visit with prescribing clinician: Visit date not found   Next office visit with prescribing clinician: Visit date not found                         Would you like a call back once the refill request has been completed: [] Yes [] No    If the office needs to give you a call back, can they leave a voicemail: [] Yes [] No    Ashley Peraza MA  07/22/24, 08:16 EDT

## 2024-07-24 ENCOUNTER — OFFICE VISIT (OUTPATIENT)
Dept: CARDIAC SURGERY | Facility: CLINIC | Age: 69
End: 2024-07-24
Payer: COMMERCIAL

## 2024-07-24 ENCOUNTER — PREP FOR SURGERY (OUTPATIENT)
Dept: OTHER | Facility: HOSPITAL | Age: 69
End: 2024-07-24
Payer: MEDICARE

## 2024-07-24 VITALS
DIASTOLIC BLOOD PRESSURE: 66 MMHG | TEMPERATURE: 97.8 F | BODY MASS INDEX: 26.14 KG/M2 | HEIGHT: 70 IN | SYSTOLIC BLOOD PRESSURE: 114 MMHG | RESPIRATION RATE: 18 BRPM | OXYGEN SATURATION: 97 % | WEIGHT: 182.6 LBS | HEART RATE: 77 BPM

## 2024-07-24 DIAGNOSIS — I25.118 CORONARY ARTERY DISEASE OF NATIVE ARTERY OF NATIVE HEART WITH STABLE ANGINA PECTORIS: Primary | ICD-10-CM

## 2024-07-24 DIAGNOSIS — I25.5 ISCHEMIC CARDIOMYOPATHY: Primary | ICD-10-CM

## 2024-07-24 DIAGNOSIS — I34.0 NONRHEUMATIC MITRAL VALVE REGURGITATION: ICD-10-CM

## 2024-07-24 PROCEDURE — 99024 POSTOP FOLLOW-UP VISIT: CPT | Performed by: THORACIC SURGERY (CARDIOTHORACIC VASCULAR SURGERY)

## 2024-07-24 RX ORDER — CHLORHEXIDINE GLUCONATE ORAL RINSE 1.2 MG/ML
15 SOLUTION DENTAL EVERY 12 HOURS
Status: CANCELLED | OUTPATIENT
Start: 2024-07-24 | End: 2024-07-25

## 2024-07-24 RX ORDER — CHLORHEXIDINE GLUCONATE ORAL RINSE 1.2 MG/ML
15 SOLUTION DENTAL ONCE
OUTPATIENT
Start: 2024-07-24 | End: 2024-07-24

## 2024-07-24 RX ORDER — ASPIRIN 81 MG/1
81 TABLET, COATED ORAL DAILY
Qty: 90 TABLET | Refills: 5 | Status: SHIPPED | OUTPATIENT
Start: 2024-07-24

## 2024-07-24 RX ORDER — ESCITALOPRAM OXALATE 5 MG/1
5 TABLET ORAL DAILY
Qty: 30 TABLET | Refills: 5 | Status: SHIPPED | OUTPATIENT
Start: 2024-07-24

## 2024-07-24 RX ORDER — CHLORHEXIDINE GLUCONATE 500 MG/1
1 CLOTH TOPICAL EVERY 12 HOURS PRN
OUTPATIENT
Start: 2024-07-24

## 2024-07-24 RX ORDER — ATORVASTATIN CALCIUM 10 MG/1
10 TABLET, FILM COATED ORAL DAILY
Qty: 90 TABLET | Refills: 3 | Status: SHIPPED | OUTPATIENT
Start: 2024-07-24

## 2024-07-24 RX ORDER — CARVEDILOL 3.12 MG/1
3.12 TABLET ORAL 2 TIMES DAILY
Qty: 180 TABLET | Refills: 3 | Status: SHIPPED | OUTPATIENT
Start: 2024-07-24

## 2024-07-24 RX ORDER — ISOSORBIDE MONONITRATE 60 MG/1
60 TABLET, EXTENDED RELEASE ORAL DAILY
Qty: 90 TABLET | Refills: 3 | Status: SHIPPED | OUTPATIENT
Start: 2024-07-24

## 2024-07-24 RX ORDER — CHLORHEXIDINE GLUCONATE 500 MG/1
1 CLOTH TOPICAL EVERY 12 HOURS PRN
Status: CANCELLED | OUTPATIENT
Start: 2024-07-24

## 2024-07-24 RX ORDER — SACUBITRIL AND VALSARTAN 24; 26 MG/1; MG/1
1 TABLET, FILM COATED ORAL EVERY 12 HOURS SCHEDULED
Qty: 56 TABLET | Refills: 0 | COMMUNITY
Start: 2024-07-24

## 2024-07-24 NOTE — PROGRESS NOTES
I am seeing him for a second opinion regarding surgery.  His daughter works in the vascular operating room and asked me to see him.  He has multivessel coronary disease with ischemic cardiomyopathy and ischemic mitral incompetence.  He is class III now.  I agree that operation is advisable.  He has been treated medically and I think he is in shape for surgery.  Will plan this for next Tuesday.  I discussed all this and all the options with the patient and his daughter.  Will plan CABG x 4 mitral valve repair and clip left atrial appendage.  All questions were answered.  Will be done at [facility] due to the preference of his daughter.

## 2024-07-24 NOTE — LETTER
July 24, 2024       No Recipients    Patient: Sudhir Diaz   YOB: 1955   Date of Visit: 7/24/2024     Dear Xavier Walden MD:       Thank you for referring Sudhir Diaz to me for evaluation. Below are the relevant portions of my assessment and plan of care.    If you have questions, please do not hesitate to call me. I look forward to following Sudhir along with you.         Sincerely,        Rubén Sanon MD        CC:   No Recipients    Jr Rubén Sanon MD  07/24/24 1068  Sign when Signing Visit  I am seeing him for a second opinion regarding surgery.  His daughter works in the vascular operating room and asked me to see him.  He has multivessel coronary disease with ischemic cardiomyopathy and ischemic mitral incompetence.  He is class III now.  I agree that operation is advisable.  He has been treated medically and I think he is in shape for surgery.  Will plan this for next Tuesday.  I discussed all this and all the options with the patient and his daughter.  Will plan CABG x 4 mitral valve repair and clip left atrial appendage.  All questions were answered.  Will be done at [facility] due to the preference of his daughter.

## 2024-07-25 ENCOUNTER — TELEPHONE (OUTPATIENT)
Dept: CARDIAC SURGERY | Facility: CLINIC | Age: 69
End: 2024-07-25
Payer: MEDICARE

## 2024-07-26 ENCOUNTER — ANESTHESIA EVENT (OUTPATIENT)
Dept: PERIOP | Facility: HOSPITAL | Age: 69
End: 2024-07-26
Payer: MEDICARE

## 2024-07-26 ENCOUNTER — HOSPITAL ENCOUNTER (OUTPATIENT)
Dept: GENERAL RADIOLOGY | Facility: HOSPITAL | Age: 69
Discharge: HOME | End: 2024-07-26
Payer: COMMERCIAL

## 2024-07-26 ENCOUNTER — HOSPITAL ENCOUNTER (OUTPATIENT)
Dept: CT IMAGING | Facility: HOSPITAL | Age: 69
Discharge: HOME | End: 2024-07-26
Payer: COMMERCIAL

## 2024-07-26 ENCOUNTER — TELEPHONE (OUTPATIENT)
Dept: CARDIAC SURGERY | Facility: CLINIC | Age: 69
End: 2024-07-26
Payer: MEDICARE

## 2024-07-26 ENCOUNTER — PRE-ADMISSION TESTING (OUTPATIENT)
Dept: PREADMISSION TESTING | Facility: HOSPITAL | Age: 69
End: 2024-07-26
Payer: COMMERCIAL

## 2024-07-26 VITALS
RESPIRATION RATE: 20 BRPM | TEMPERATURE: 97 F | SYSTOLIC BLOOD PRESSURE: 134 MMHG | OXYGEN SATURATION: 98 % | HEIGHT: 67 IN | BODY MASS INDEX: 28.56 KG/M2 | WEIGHT: 182 LBS | HEART RATE: 83 BPM | DIASTOLIC BLOOD PRESSURE: 83 MMHG

## 2024-07-26 DIAGNOSIS — M54.50 ACUTE LEFT-SIDED LOW BACK PAIN, UNSPECIFIED WHETHER SCIATICA PRESENT: Primary | ICD-10-CM

## 2024-07-26 DIAGNOSIS — I25.118 CORONARY ARTERY DISEASE OF NATIVE ARTERY OF NATIVE HEART WITH STABLE ANGINA PECTORIS: ICD-10-CM

## 2024-07-26 DIAGNOSIS — M54.50 ACUTE LEFT-SIDED LOW BACK PAIN, UNSPECIFIED WHETHER SCIATICA PRESENT: ICD-10-CM

## 2024-07-26 LAB
ABO GROUP BLD: NORMAL
ALBUMIN SERPL-MCNC: 4.2 G/DL (ref 3.5–5.2)
ALBUMIN/GLOB SERPL: 1.6 G/DL
ALP SERPL-CCNC: 81 U/L (ref 39–117)
ALT SERPL W P-5'-P-CCNC: 17 U/L (ref 1–41)
ANION GAP SERPL CALCULATED.3IONS-SCNC: 7.3 MMOL/L (ref 5–15)
APTT PPP: 27.4 SECONDS (ref 22.7–35.4)
ARTERIAL PATENCY WRIST A: POSITIVE
AST SERPL-CCNC: 17 U/L (ref 1–40)
ATMOSPHERIC PRESS: 752.6 MMHG
BASE EXCESS BLDA CALC-SCNC: -0.6 MMOL/L (ref 0–2)
BASOPHILS # BLD AUTO: 0.04 10*3/MM3 (ref 0–0.2)
BASOPHILS NFR BLD AUTO: 0.7 % (ref 0–1.5)
BDY SITE: ABNORMAL
BILIRUB SERPL-MCNC: 0.3 MG/DL (ref 0–1.2)
BILIRUB UR QL STRIP: NEGATIVE
BLD GP AB SCN SERPL QL: NEGATIVE
BUN SERPL-MCNC: 17 MG/DL (ref 8–23)
BUN/CREAT SERPL: 16.7 (ref 7–25)
CALCIUM SPEC-SCNC: 9.3 MG/DL (ref 8.6–10.5)
CHLORIDE SERPL-SCNC: 106 MMOL/L (ref 98–107)
CHOLEST SERPL-MCNC: 199 MG/DL (ref 0–200)
CLARITY UR: CLEAR
CLOSE TME COLL+ADP + EPINEP PNL BLD: 88 % (ref 86–100)
CO2 BLDA-SCNC: 24.3 MMOL/L (ref 23–27)
CO2 SERPL-SCNC: 26.7 MMOL/L (ref 22–29)
COLOR UR: YELLOW
CREAT SERPL-MCNC: 1.02 MG/DL (ref 0.76–1.27)
DEPRECATED RDW RBC AUTO: 46.2 FL (ref 37–54)
EGFRCR SERPLBLD CKD-EPI 2021: 78.6 ML/MIN/1.73
EOSINOPHIL # BLD AUTO: 0.2 10*3/MM3 (ref 0–0.4)
EOSINOPHIL NFR BLD AUTO: 3.7 % (ref 0.3–6.2)
ERYTHROCYTE [DISTWIDTH] IN BLOOD BY AUTOMATED COUNT: 15 % (ref 12.3–15.4)
GLOBULIN UR ELPH-MCNC: 2.7 GM/DL
GLUCOSE SERPL-MCNC: 125 MG/DL (ref 65–99)
GLUCOSE UR STRIP-MCNC: NEGATIVE MG/DL
HBA1C MFR BLD: 6.2 % (ref 4.8–5.6)
HCO3 BLDA-SCNC: 23.2 MMOL/L (ref 22–28)
HCT VFR BLD AUTO: 41.4 % (ref 37.5–51)
HDLC SERPL-MCNC: 65 MG/DL (ref 40–60)
HEMODILUTION: NO
HGB BLD-MCNC: 13.4 G/DL (ref 13–17.7)
HGB UR QL STRIP.AUTO: NEGATIVE
IMM GRANULOCYTES # BLD AUTO: 0.02 10*3/MM3 (ref 0–0.05)
IMM GRANULOCYTES NFR BLD AUTO: 0.4 % (ref 0–0.5)
INR PPP: 0.99 (ref 0.9–1.1)
KETONES UR QL STRIP: NEGATIVE
LDLC SERPL CALC-MCNC: 117 MG/DL (ref 0–100)
LDLC/HDLC SERPL: 1.77 {RATIO}
LEUKOCYTE ESTERASE UR QL STRIP.AUTO: NEGATIVE
LYMPHOCYTES # BLD AUTO: 1.81 10*3/MM3 (ref 0.7–3.1)
LYMPHOCYTES NFR BLD AUTO: 33.4 % (ref 19.6–45.3)
MAGNESIUM SERPL-MCNC: 2.1 MG/DL (ref 1.6–2.4)
MCH RBC QN AUTO: 27.4 PG (ref 26.6–33)
MCHC RBC AUTO-ENTMCNC: 32.4 G/DL (ref 31.5–35.7)
MCV RBC AUTO: 84.7 FL (ref 79–97)
MODALITY: ABNORMAL
MONOCYTES # BLD AUTO: 0.4 10*3/MM3 (ref 0.1–0.9)
MONOCYTES NFR BLD AUTO: 7.4 % (ref 5–12)
NEUTROPHILS NFR BLD AUTO: 2.95 10*3/MM3 (ref 1.7–7)
NEUTROPHILS NFR BLD AUTO: 54.4 % (ref 42.7–76)
NITRITE UR QL STRIP: NEGATIVE
NRBC BLD AUTO-RTO: 0 /100 WBC (ref 0–0.2)
NT-PROBNP SERPL-MCNC: 634 PG/ML (ref 0–900)
PCO2 BLDA: 34.7 MM HG (ref 35–45)
PH BLDA: 7.43 PH UNITS (ref 7.35–7.45)
PH UR STRIP.AUTO: <5 [PH] (ref 5–8)
PLATELET # BLD AUTO: 222 10*3/MM3 (ref 140–450)
PMV BLD AUTO: 8.9 FL (ref 6–12)
PO2 BLDA: 94 MM HG (ref 80–100)
POTASSIUM SERPL-SCNC: 4.1 MMOL/L (ref 3.5–5.2)
PROT SERPL-MCNC: 6.9 G/DL (ref 6–8.5)
PROT UR QL STRIP: NEGATIVE
PROTHROMBIN TIME: 13.3 SECONDS (ref 11.7–14.2)
QT INTERVAL: 485 MS
QTC INTERVAL: 520 MS
RBC # BLD AUTO: 4.89 10*6/MM3 (ref 4.14–5.8)
RH BLD: POSITIVE
SAO2 % BLDCOA: 97.6 % (ref 92–98.5)
SARS-COV-2 RNA RESP QL NAA+PROBE: NOT DETECTED
SODIUM SERPL-SCNC: 140 MMOL/L (ref 136–145)
SP GR UR STRIP: 1.01 (ref 1–1.03)
T&S EXPIRATION DATE: NORMAL
TOTAL RATE: 18 BREATHS/MINUTE
TRIGL SERPL-MCNC: 94 MG/DL (ref 0–150)
UROBILINOGEN UR QL STRIP: ABNORMAL
VLDLC SERPL-MCNC: 17 MG/DL (ref 5–40)
WBC NRBC COR # BLD AUTO: 5.42 10*3/MM3 (ref 3.4–10.8)

## 2024-07-26 PROCEDURE — 86900 BLOOD TYPING SEROLOGIC ABO: CPT

## 2024-07-26 PROCEDURE — 36600 WITHDRAWAL OF ARTERIAL BLOOD: CPT | Performed by: THORACIC SURGERY (CARDIOTHORACIC VASCULAR SURGERY)

## 2024-07-26 PROCEDURE — 80061 LIPID PANEL: CPT

## 2024-07-26 PROCEDURE — 86901 BLOOD TYPING SEROLOGIC RH(D): CPT

## 2024-07-26 PROCEDURE — 87635 SARS-COV-2 COVID-19 AMP PRB: CPT

## 2024-07-26 PROCEDURE — 85576 BLOOD PLATELET AGGREGATION: CPT

## 2024-07-26 PROCEDURE — 71046 X-RAY EXAM CHEST 2 VIEWS: CPT

## 2024-07-26 PROCEDURE — 85025 COMPLETE CBC W/AUTO DIFF WBC: CPT

## 2024-07-26 PROCEDURE — 80053 COMPREHEN METABOLIC PANEL: CPT

## 2024-07-26 PROCEDURE — 83735 ASSAY OF MAGNESIUM: CPT

## 2024-07-26 PROCEDURE — 74176 CT ABD & PELVIS W/O CONTRAST: CPT

## 2024-07-26 PROCEDURE — 93005 ELECTROCARDIOGRAM TRACING: CPT

## 2024-07-26 PROCEDURE — 82803 BLOOD GASES ANY COMBINATION: CPT | Performed by: THORACIC SURGERY (CARDIOTHORACIC VASCULAR SURGERY)

## 2024-07-26 PROCEDURE — 85610 PROTHROMBIN TIME: CPT

## 2024-07-26 PROCEDURE — 81003 URINALYSIS AUTO W/O SCOPE: CPT

## 2024-07-26 PROCEDURE — 83880 ASSAY OF NATRIURETIC PEPTIDE: CPT

## 2024-07-26 PROCEDURE — 86920 COMPATIBILITY TEST SPIN: CPT

## 2024-07-26 PROCEDURE — 93010 ELECTROCARDIOGRAM REPORT: CPT | Performed by: INTERNAL MEDICINE

## 2024-07-26 PROCEDURE — 36415 COLL VENOUS BLD VENIPUNCTURE: CPT

## 2024-07-26 PROCEDURE — 86850 RBC ANTIBODY SCREEN: CPT

## 2024-07-26 PROCEDURE — 83036 HEMOGLOBIN GLYCOSYLATED A1C: CPT

## 2024-07-26 PROCEDURE — 85730 THROMBOPLASTIN TIME PARTIAL: CPT

## 2024-07-26 RX ORDER — CHLORHEXIDINE GLUCONATE ORAL RINSE 1.2 MG/ML
15 SOLUTION DENTAL EVERY 12 HOURS
Status: DISPENSED | OUTPATIENT
Start: 2024-07-26 | End: 2024-07-27

## 2024-07-26 RX ORDER — CHLORHEXIDINE GLUCONATE 500 MG/1
1 CLOTH TOPICAL EVERY 12 HOURS PRN
Status: ACTIVE | OUTPATIENT
Start: 2024-07-26

## 2024-07-26 RX ORDER — IBUPROFEN 200 MG
800 TABLET ORAL EVERY 6 HOURS PRN
COMMUNITY
End: 2024-08-04 | Stop reason: HOSPADM

## 2024-07-26 NOTE — DISCHARGE INSTRUCTIONS
Take the following medications the morning of surgery with a small sip of water:    none    If you are on prescription narcotic pain medication to control your pain you may also take that medication the morning of surgery.    General Instructions:  Do not eat or drink anything after midnight the night before surgery.  Infants may have breast milk up to four hours before surgery.  Infants drinking formula may drink formula up to six hours before surgery.   Patients who avoid smoking, chewing tobacco and alcohol for 4 weeks prior to surgery have a reduced risk of post-operative complications.  Quit smoking as many days before surgery as you can.  Do not smoke, use chewing tobacco or drink alcohol the day of surgery.   If applicable bring your C-PAP/ BI-PAP machine in with you to preop day of surgery.  Bring any papers given to you in the doctor’s office.  Wear clean comfortable clothes.  Do not wear contact lenses, false eyelashes or make-up.  Bring a case for your glasses.   Bring crutches or walker if applicable.  Remove all piercings.  Leave jewelry and any other valuables at home.  Hair extensions with metal clips must be removed prior to surgery.  The Pre-Admission Testing nurse will instruct you to bring medications if unable to obtain an accurate list in Pre-Admission Testing.        If you were given a blood bank ID arm band remember to bring it with you the day of surgery.    Preventing a Surgical Site Infection:  For 2 to 3 days before surgery, avoid shaving with a razor because the razor can irritate skin and make it easier to develop an infection.    Any areas of open skin can increase the risk of a post-operative wound infection by allowing bacteria to enter and travel throughout the body.  Notify your surgeon if you have any skin wounds / rashes even if it is not near the expected surgical site.  The area will need assessed to determine if surgery should be delayed until it is healed.  The night prior to  surgery shower using a fresh bar of anti-bacterial soap (such as Dial) and clean washcloth.  Sleep in a clean bed with clean clothing.  Do not allow pets to sleep with you.  Shower on the morning of surgery using a fresh bar of anti-bacterial soap (such as Dial) and clean washcloth.  Dry with a clean towel and dress in clean clothing.  Ask your surgeon if you will be receiving antibiotics prior to surgery.  Make sure you, your family, and all healthcare providers clean their hands with soap and water or an alcohol based hand  before caring for you or your wound.    Day of surgery:7/30/2024  Your arrival time is approximately two hours before your scheduled surgery time.  Upon arrival, a Pre-op nurse and Anesthesiologist will review your health history, obtain vital signs, and answer questions you may have.  The only belongings needed at this time will be your home medications and if applicable your C-PAP/BI-PAP machine.  A Pre-op nurse will start an IV and you may receive medication in preparation for surgery, including something to help you relax.      Please be aware that surgery does come with discomfort.  We want to make every effort to control your discomfort so please discuss any uncontrolled symptoms with your nurse.   Your doctor will most likely have prescribed pain medications.      If you are going home after surgery you will receive individualized written care instructions before being discharged.  A responsible adult must drive you to and from the hospital on the day of your surgery and ideally stay with you through the night.  Discharge prescriptions can be filled by the hospital pharmacy during regular pharmacy hours.  If you are having surgery late in the day/evening your prescription may be e-prescribed to your pharmacy.  Please verify your pharmacy hours or chose a 24 hour pharmacy to avoid not having access to your prescription because your pharmacy has closed for the day.    If you are  staying overnight following surgery, you will be transported to your hospital room following the recovery period.  [facility] has all private rooms.    If you have any questions please call Pre-Admission Testing at [phone].  Deductibles and co-payments are collected on the day of service. Please be prepared to pay the required co-pay, deductible or deposit on the day of service as defined by your plan.    Call your surgeon immediately if you experience any of the following symptoms:  Sore Throat  Shortness of Breath or difficulty breathing  Cough  Chills  Body soreness or muscle pain  Headache  Fever  New loss of taste or smell  Do not arrive for your surgery ill.  Your procedure will need to be rescheduled to another time.  You will need to call your physician before the day of surgery to avoid any unnecessary exposure to hospital staff as well as other patients.  CHLORHEXIDINE CLOTH INSTRUCTIONS  The morning of surgery follow these instructions using the Chlorhexidine cloths you've been given.  These steps reduce bacteria on the body.  Do not use the cloths near your eyes, ears mouth, genitalia or on open wounds.  Throw the cloths away after use but do not try to flush them down a toilet.      Open and remove one cloth at a time from the package.    Leave the cloth unfolded and begin the bathing.  Massage the skin with the cloths using gentle pressure to remove bacteria.  Do not scrub harshly.   Follow the steps below with one 2% CHG cloth per area (6 total cloths).  One cloth for neck, shoulders and chest.  One cloth for both arms, hands, fingers and underarms (do underarms last).  One cloth for the abdomen followed by groin.  One cloth for right leg and foot including between the toes.  One cloth for left leg and foot including between the toes.  The last cloth is to be used for the back of the neck, back and buttocks.    Allow the CHG to air dry 3 minutes on the skin which will give it time  to work and decrease the chance of irritation.  The skin may feel sticky until it is dry.  Do not rinse with water or any other liquid or you will lose the beneficial effects of the CHG.  If mild skin irritation occurs, do rinse the skin to remove the CHG.  Report this to the nurse at time of admission.  Do not apply lotions, creams, ointments, deodorants or perfumes after using the clothes. Dress in clean clothes before coming to the hospital.    BACTROBAN NASAL OINTMENT  There are many germs normally in your nose. Bactroban is an ointment that will help reduce these germs. Please follow these instructions for Bactroban use:          ____The day before surgery in the evening              Date________    ____The day of surgery in the morning    Date________    **Squirt ½ package of Bactroban Ointment onto a cotton applicator and apply to inside of 1st nostril.  Squirt the remaining Bactroban and apply to the inside of the other nostril.    PERIDEX- ORAL:  Use only if your surgeon has ordered  Use the night before and morning of surgery - Swish, gargle, and spit - do not swallow.

## 2024-07-26 NOTE — ANESTHESIA PREPROCEDURE EVALUATION
Anesthesia Evaluation     Patient summary reviewed and Nursing notes reviewed   NPO Solid Status: > 8 hours  NPO Liquid Status: > 8 hours           Airway   Mallampati: II  Neck ROM: limited  Possible difficult intubation  Dental - normal exam     Pulmonary     breath sounds clear to auscultation  (+) a smoker Former,shortness of breath  Cardiovascular     Rhythm: regular    (+) hypertension, valvular problems/murmurs MR, CAD, dysrhythmias, angina, hyperlipidemia    ROS comment: LBBB    Neuro/Psych  (+) psychiatric history Anxiety  GI/Hepatic/Renal/Endo      Musculoskeletal     (+) back pain  Abdominal    Substance History      OB/GYN          Other   arthritis,                 Anesthesia Plan    ASA 4     general     (A line, introducer, swan,post op vent ,WADE, awareness discussed.  When I asked him to tilt his head back, he keeps leaning back instead, would have CMAC availavble)  intravenous induction     Anesthetic plan, risks, benefits, and alternatives have been provided, discussed and informed consent has been obtained with: patient.    CODE STATUS:

## 2024-07-26 NOTE — H&P
Name: Sudhir Diaz ADMIT: (Not on file)   : 1955  PCP: Xavier Walden MD    MRN: [id number] LOS: 0 days   AGE/SEX: 69 y.o. male  ROOM: Room/bed info not found     Chief Complaint:  Coronary artery disease    Subjective   History of Present Illness  Patient is a 69 y.o. male with a past medical history including HTN, HLD, and known CAD/valvular heart disease. He recently saw Dr. Sanon in the office and CABG, mitral valve repair, and left atrial appendage occlusion were recommended.  [facility] for [facility].  He has a new complaint of left flank/lower back pain.  It is intermittent and at times severe.  It is worse with some movement.  No noticeable blood in his urine or dysuria. Has not had a kidney stone before.  He denies chest pain, shortness of breath, palpitations, syncope, fever/chills, or nausea/vomiting.    Past Medical History:   Diagnosis Date    Anxiety     Arthritis     At risk for sleep apnea     5    Back pain     started 2024 in lower back happens sporadically  no noticable blood in urine  no c/o nausea/ vomiting    Cardiomyopathy     Coronary artery disease     Heart valve disease     mitral    Hyperlipidemia     Hypertension      Past Surgical History:   Procedure Laterality Date    CARDIAC CATHETERIZATION N/A 2024    Procedure: Left and Right Heart Cath with Coronary Angiography;  Surgeon: Peg Pettit MD;  Location: [facility] CATH INVASIVE LOCATION;  Service: Cardiovascular;  Laterality: N/A;    CARPAL TUNNEL RELEASE Bilateral     CHOLECYSTECTOMY      COLONOSCOPY      SHOULDER SURGERY Right      Family History   Problem Relation Age of Onset    Malig Hyperthermia Neg Hx      Social History     Tobacco Use    Smoking status: Former     Types: Cigarettes     Passive exposure: Past    Smokeless tobacco: Never    Tobacco comments:     Smoked 1 pack daily  for 15 years quit    Vaping Use    Vaping status: Never Used    Substance Use Topics    Alcohol use: Yes     Comment: 1-2 monthly    Drug use: Never     No medications prior to admission.     Allergies:  Patient has no known allergies.    Review of Systems   Musculoskeletal:  Positive for back pain.   All other systems reviewed and are negative.       Objective    Vital Signs  Temp:  [97 °F (36.1 °C)] 97 °F (36.1 °C)  Heart Rate:  [83] 83  Resp:  [20] 20  BP: (134-149)/(83-91) 134/83  SpO2:  [98 %] 98 %  on   ;   Device (Oxygen Therapy): room air  There is no height or weight on file to calculate BMI.    Physical Exam  Constitutional:       Comments: Appears uncomfortable   HENT:      Head: Normocephalic and atraumatic.      Mouth/Throat:      Mouth: Mucous membranes are moist.      Pharynx: Oropharynx is clear.   Cardiovascular:      Rate and Rhythm: Normal rate and regular rhythm.      Heart sounds: Murmur heard.   Pulmonary:      Effort: Pulmonary effort is normal. No respiratory distress.      Breath sounds: No wheezing.   Abdominal:      General: There is no distension.      Tenderness: There is no abdominal tenderness.   Musculoskeletal:         General: No swelling. Normal range of motion.      Cervical back: Normal range of motion.      Comments: No back pain elicited with palpation   Skin:     General: Skin is warm and dry.   Neurological:      General: No focal deficit present.      Mental Status: He is alert and oriented to person, place, and time.   Psychiatric:         Mood and Affect: Mood normal.         Behavior: Behavior normal.         Thought Content: Thought content normal.         Judgment: Judgment normal.         Results Review:   I reviewed the patient's new clinical results.    WBC WBC   Date Value Ref Range Status   07/26/2024 5.42 3.40 - 10.80 10*3/mm3 Final      HGB Hemoglobin   Date Value Ref Range Status   07/26/2024 13.4 13.0 - 17.7 g/dL Final      HCT Hematocrit   Date Value Ref Range Status   07/26/2024 41.4 37.5 - 51.0 % Final      Platelets  Platelets   Date Value Ref Range Status   2024 222 140 - 450 10*3/mm3 Final        PT/INR:    Protime   Date Value Ref Range Status   2024 13.3 11.7 - 14.2 Seconds Final   /  INR   Date Value Ref Range Status   2024 0.99 0.90 - 1.10 Final       Sodium Sodium   Date Value Ref Range Status   2024 140 136 - 145 mmol/L Final      Potassium Potassium   Date Value Ref Range Status   2024 4.1 3.5 - 5.2 mmol/L Final      Chloride Chloride   Date Value Ref Range Status   2024 106 98 - 107 mmol/L Final      Bicarbonate CO2   Date Value Ref Range Status   2024 26.7 22.0 - 29.0 mmol/L Final      BUN BUN   Date Value Ref Range Status   2024 17 8 - 23 mg/dL Final      Creatinine Creatinine   Date Value Ref Range Status   2024 1.02 0.76 - 1.27 mg/dL Final      Calcium Calcium   Date Value Ref Range Status   2024 9.3 8.6 - 10.5 mg/dL Final      Magnesium Magnesium   Date Value Ref Range Status   2024 2.1 1.6 - 2.4 mg/dL Final          Assessment & Plan       Coronary artery disease of native artery of native heart with stable angina pectoris      Assessment & Plan      - left lower back/flank pain  - coronary artery disease  - mitral regurgitation  - HTN  - HLD  - cardiomyopathy, EF 26-35% on TTE      Patient seen and examined in PAT. Pre-operative labs and imaging reviewed. CXR pending. Discussed plans for surgery and questions answered; patient and daughter voiced understanding. Order CT abdomen pelvis without contrast to rule out kidney stone. Office will schedule and contact patient. Plans for surgery will depend on results of CT. Will discuss with Dr. Sanon.        plt ag%  HgbA1c: 6.2  UA: negative  CXR: pending  Carotid duplex: Rt < 50%, left normal  Vein mapping: adequate  COVID swab: negative      Carlos Manuel Colón PA-C  24  08:58 EDT

## 2024-07-26 NOTE — TELEPHONE ENCOUNTER
----- Message from [facility] Lynnette sent at 7/26/2024 12:19 PM EDT -----  Regarding: Meds day of surgery  Contact: [phone]  Silly question, they told us in pre-op they would address his meds that he’s on but we were all occupied about back pain that just started and we all forgot about meds. Should he continue the meds he’s on up to surgery and does he need to take them the morning of surgery?

## 2024-07-26 NOTE — TELEPHONE ENCOUNTER
Reviewed medications with Saima DUCKWORTH.     Spoke with Cassandra. Advised to hold all medications morning of surgery. Instructed not to take any Ibuprofen starting today and going forward. He can take tylenol for pain as needed.

## 2024-07-29 ENCOUNTER — TELEPHONE (OUTPATIENT)
Dept: CARDIAC SURGERY | Facility: CLINIC | Age: 69
End: 2024-07-29
Payer: MEDICARE

## 2024-07-29 NOTE — TELEPHONE ENCOUNTER
Pt states the kidney stone has not really bothered him. He reports he did take some tylenol over the weekend. Advised if conditions worsen to notify office. He verbalized understanding and this was agreeable.

## 2024-07-30 ENCOUNTER — APPOINTMENT (OUTPATIENT)
Dept: GENERAL RADIOLOGY | Facility: HOSPITAL | Age: 69
DRG: 220 | End: 2024-07-30
Payer: MEDICARE

## 2024-07-30 ENCOUNTER — ANESTHESIA (OUTPATIENT)
Dept: PERIOP | Facility: HOSPITAL | Age: 69
End: 2024-07-30
Payer: MEDICARE

## 2024-07-30 ENCOUNTER — HOSPITAL ENCOUNTER (INPATIENT)
Facility: HOSPITAL | Age: 69
LOS: 5 days | Discharge: HOME HEALTH | DRG: 220 | End: 2024-08-04
Attending: THORACIC SURGERY (CARDIOTHORACIC VASCULAR SURGERY) | Admitting: THORACIC SURGERY (CARDIOTHORACIC VASCULAR SURGERY)
Payer: MEDICARE

## 2024-07-30 ENCOUNTER — ANCILLARY PROCEDURE (OUTPATIENT)
Dept: PERIOP | Facility: HOSPITAL | Age: 69
DRG: 220 | End: 2024-07-30
Payer: MEDICARE

## 2024-07-30 DIAGNOSIS — Z95.1 S/P CABG (CORONARY ARTERY BYPASS GRAFT): Primary | ICD-10-CM

## 2024-07-30 DIAGNOSIS — R79.1 ABNORMAL COAGULATION PROFILE: ICD-10-CM

## 2024-07-30 DIAGNOSIS — I25.118 CORONARY ARTERY DISEASE OF NATIVE ARTERY OF NATIVE HEART WITH STABLE ANGINA PECTORIS: ICD-10-CM

## 2024-07-30 PROBLEM — I25.10 CAD (CORONARY ARTERY DISEASE), NATIVE CORONARY ARTERY: Status: ACTIVE | Noted: 2024-07-30

## 2024-07-30 LAB
ACT BLD: 116 SECONDS (ref 82–152)
ACT BLD: 140 SECONDS (ref 82–152)
ACT BLD: 391 SECONDS (ref 82–152)
ACT BLD: 397 SECONDS (ref 82–152)
ACT BLD: 421 SECONDS (ref 82–152)
ACT BLD: 428 SECONDS (ref 82–152)
ACT BLD: 470 SECONDS (ref 82–152)
ALBUMIN SERPL-MCNC: 4 G/DL (ref 3.5–5.2)
ALBUMIN SERPL-MCNC: 4.6 G/DL (ref 3.5–5.2)
ANION GAP SERPL CALCULATED.3IONS-SCNC: 10.4 MMOL/L (ref 5–15)
ANION GAP SERPL CALCULATED.3IONS-SCNC: 8.1 MMOL/L (ref 5–15)
APTT PPP: 26.7 SECONDS (ref 22.7–35.4)
APTT PPP: >200 SECONDS (ref 22.7–35.4)
ARTERIAL PATENCY WRIST A: ABNORMAL
ARTERIAL PATENCY WRIST A: ABNORMAL
ATMOSPHERIC PRESS: 746.2 MMHG
ATMOSPHERIC PRESS: 746.3 MMHG
BASE EXCESS BLDA CALC-SCNC: -1 MMOL/L (ref -5–5)
BASE EXCESS BLDA CALC-SCNC: -1.6 MMOL/L (ref 0–2)
BASE EXCESS BLDA CALC-SCNC: -2 MMOL/L (ref -5–5)
BASE EXCESS BLDA CALC-SCNC: -2 MMOL/L (ref -5–5)
BASE EXCESS BLDA CALC-SCNC: -4 MMOL/L (ref 0–2)
BASE EXCESS BLDA CALC-SCNC: 1 MMOL/L (ref -5–5)
BASE EXCESS BLDA CALC-SCNC: 2 MMOL/L (ref -5–5)
BASE EXCESS BLDA CALC-SCNC: 6 MMOL/L (ref -5–5)
BASOPHILS # BLD AUTO: 0.03 10*3/MM3 (ref 0–0.2)
BASOPHILS NFR BLD AUTO: 0.2 % (ref 0–1.5)
BDY SITE: ABNORMAL
BDY SITE: ABNORMAL
BUN SERPL-MCNC: 14 MG/DL (ref 8–23)
BUN SERPL-MCNC: 17 MG/DL (ref 8–23)
BUN/CREAT SERPL: 13.1 (ref 7–25)
BUN/CREAT SERPL: 16 (ref 7–25)
CA-I BLD-MCNC: 5.6 MG/DL (ref 4.6–5.4)
CA-I SERPL ISE-MCNC: 1.39 MMOL/L (ref 1.15–1.35)
CALCIUM SPEC-SCNC: 8.7 MG/DL (ref 8.6–10.5)
CALCIUM SPEC-SCNC: 9.7 MG/DL (ref 8.6–10.5)
CHLORIDE SERPL-SCNC: 112 MMOL/L (ref 98–107)
CHLORIDE SERPL-SCNC: 113 MMOL/L (ref 98–107)
CO2 BLDA-SCNC: 22.7 MMOL/L (ref 23–27)
CO2 BLDA-SCNC: 23 MMOL/L (ref 24–29)
CO2 BLDA-SCNC: 23.3 MMOL/L (ref 23–27)
CO2 BLDA-SCNC: 26 MMOL/L (ref 24–29)
CO2 BLDA-SCNC: 26 MMOL/L (ref 24–29)
CO2 BLDA-SCNC: 27 MMOL/L (ref 24–29)
CO2 BLDA-SCNC: 29 MMOL/L (ref 24–29)
CO2 BLDA-SCNC: 33 MMOL/L (ref 24–29)
CO2 SERPL-SCNC: 19.6 MMOL/L (ref 22–29)
CO2 SERPL-SCNC: 21.9 MMOL/L (ref 22–29)
CREAT SERPL-MCNC: 1.06 MG/DL (ref 0.76–1.27)
CREAT SERPL-MCNC: 1.07 MG/DL (ref 0.76–1.27)
DEPRECATED RDW RBC AUTO: 44.3 FL (ref 37–54)
DEPRECATED RDW RBC AUTO: 44.3 FL (ref 37–54)
DEPRECATED RDW RBC AUTO: 46.9 FL (ref 37–54)
DEVICE COMMENT: ABNORMAL
DEVICE COMMENT: ABNORMAL
EGFRCR SERPLBLD CKD-EPI 2021: 74.2 ML/MIN/1.73
EGFRCR SERPLBLD CKD-EPI 2021: 75 ML/MIN/1.73
EOSINOPHIL # BLD AUTO: 0.07 10*3/MM3 (ref 0–0.4)
EOSINOPHIL NFR BLD AUTO: 0.5 % (ref 0.3–6.2)
ERYTHROCYTE [DISTWIDTH] IN BLOOD BY AUTOMATED COUNT: 14.5 % (ref 12.3–15.4)
ERYTHROCYTE [DISTWIDTH] IN BLOOD BY AUTOMATED COUNT: 14.5 % (ref 12.3–15.4)
ERYTHROCYTE [DISTWIDTH] IN BLOOD BY AUTOMATED COUNT: 14.8 % (ref 12.3–15.4)
FIBRINOGEN PPP-MCNC: 238 MG/DL (ref 219–464)
FIBRINOGEN PPP-MCNC: 256 MG/DL (ref 219–464)
GLUCOSE BLDC GLUCOMTR-MCNC: 119 MG/DL (ref 70–130)
GLUCOSE BLDC GLUCOMTR-MCNC: 127 MG/DL (ref 70–130)
GLUCOSE BLDC GLUCOMTR-MCNC: 131 MG/DL (ref 70–130)
GLUCOSE BLDC GLUCOMTR-MCNC: 135 MG/DL (ref 70–130)
GLUCOSE BLDC GLUCOMTR-MCNC: 135 MG/DL (ref 70–130)
GLUCOSE BLDC GLUCOMTR-MCNC: 141 MG/DL (ref 70–130)
GLUCOSE BLDC GLUCOMTR-MCNC: 142 MG/DL (ref 70–130)
GLUCOSE BLDC GLUCOMTR-MCNC: 145 MG/DL (ref 70–130)
GLUCOSE BLDC GLUCOMTR-MCNC: 147 MG/DL (ref 70–130)
GLUCOSE BLDC GLUCOMTR-MCNC: 159 MG/DL (ref 70–130)
GLUCOSE BLDC GLUCOMTR-MCNC: 169 MG/DL (ref 70–130)
GLUCOSE BLDC GLUCOMTR-MCNC: 175 MG/DL (ref 70–130)
GLUCOSE BLDC GLUCOMTR-MCNC: 184 MG/DL (ref 70–130)
GLUCOSE BLDC GLUCOMTR-MCNC: 189 MG/DL (ref 70–130)
GLUCOSE SERPL-MCNC: 113 MG/DL (ref 65–99)
GLUCOSE SERPL-MCNC: 132 MG/DL (ref 65–99)
HCO3 BLDA-SCNC: 21.4 MMOL/L (ref 22–28)
HCO3 BLDA-SCNC: 22.2 MMOL/L (ref 22–26)
HCO3 BLDA-SCNC: 22.3 MMOL/L (ref 22–28)
HCO3 BLDA-SCNC: 24.3 MMOL/L (ref 22–26)
HCO3 BLDA-SCNC: 24.6 MMOL/L (ref 22–26)
HCO3 BLDA-SCNC: 26.1 MMOL/L (ref 22–26)
HCO3 BLDA-SCNC: 27.7 MMOL/L (ref 22–26)
HCO3 BLDA-SCNC: 31 MMOL/L (ref 22–26)
HCT VFR BLD AUTO: 25.9 % (ref 37.5–51)
HCT VFR BLD AUTO: 31.1 % (ref 37.5–51)
HCT VFR BLD AUTO: 35.4 % (ref 37.5–51)
HCT VFR BLDA CALC: 28 % (ref 38–51)
HCT VFR BLDA CALC: 28 % (ref 38–51)
HCT VFR BLDA CALC: 29 % (ref 38–51)
HCT VFR BLDA CALC: 29 % (ref 38–51)
HCT VFR BLDA CALC: 30 % (ref 38–51)
HCT VFR BLDA CALC: 35 % (ref 38–51)
HEMODILUTION: NO
HEMODILUTION: NO
HGB BLD-MCNC: 10.4 G/DL (ref 13–17.7)
HGB BLD-MCNC: 11.7 G/DL (ref 13–17.7)
HGB BLD-MCNC: 8.5 G/DL (ref 13–17.7)
HGB BLDA-MCNC: 10.2 G/DL (ref 12–17)
HGB BLDA-MCNC: 11.9 G/DL (ref 12–17)
HGB BLDA-MCNC: 9.5 G/DL (ref 12–17)
HGB BLDA-MCNC: 9.5 G/DL (ref 12–17)
HGB BLDA-MCNC: 9.9 G/DL (ref 12–17)
HGB BLDA-MCNC: 9.9 G/DL (ref 12–17)
IMM GRANULOCYTES # BLD AUTO: 0.06 10*3/MM3 (ref 0–0.05)
IMM GRANULOCYTES NFR BLD AUTO: 0.5 % (ref 0–0.5)
INHALED O2 CONCENTRATION: 100 %
INHALED O2 CONCENTRATION: 40 %
INR PPP: 1.32 (ref 0.9–1.1)
INR PPP: 2.6 (ref 0.9–1.1)
LYMPHOCYTES # BLD AUTO: 1.09 10*3/MM3 (ref 0.7–3.1)
LYMPHOCYTES NFR BLD AUTO: 8.3 % (ref 19.6–45.3)
MAGNESIUM SERPL-MCNC: 2.8 MG/DL (ref 1.6–2.4)
MAGNESIUM SERPL-MCNC: 3.1 MG/DL (ref 1.6–2.4)
MCH RBC QN AUTO: 27.6 PG (ref 26.6–33)
MCH RBC QN AUTO: 28 PG (ref 26.6–33)
MCH RBC QN AUTO: 28 PG (ref 26.6–33)
MCHC RBC AUTO-ENTMCNC: 32.8 G/DL (ref 31.5–35.7)
MCHC RBC AUTO-ENTMCNC: 33.1 G/DL (ref 31.5–35.7)
MCHC RBC AUTO-ENTMCNC: 33.4 G/DL (ref 31.5–35.7)
MCV RBC AUTO: 83.5 FL (ref 79–97)
MCV RBC AUTO: 83.8 FL (ref 79–97)
MCV RBC AUTO: 85.2 FL (ref 79–97)
MODALITY: ABNORMAL
MODALITY: ABNORMAL
MONOCYTES # BLD AUTO: 0.82 10*3/MM3 (ref 0.1–0.9)
MONOCYTES NFR BLD AUTO: 6.2 % (ref 5–12)
NEUTROPHILS NFR BLD AUTO: 11.1 10*3/MM3 (ref 1.7–7)
NEUTROPHILS NFR BLD AUTO: 84.3 % (ref 42.7–76)
NRBC BLD AUTO-RTO: 0 /100 WBC (ref 0–0.2)
O2 A-A PPRESDIFF RESPIRATORY: 0.4 MMHG
O2 A-A PPRESDIFF RESPIRATORY: 0.5 MMHG
PCO2 BLDA: 33 MM HG (ref 35–45)
PCO2 BLDA: 33.3 MM HG (ref 35–45)
PCO2 BLDA: 39.7 MM HG (ref 35–45)
PCO2 BLDA: 43.7 MM HG (ref 35–45)
PCO2 BLDA: 44.2 MM HG (ref 35–45)
PCO2 BLDA: 47.9 MM HG (ref 35–45)
PCO2 BLDA: 48.5 MM HG (ref 35–45)
PCO2 BLDA: 51.7 MM HG (ref 35–45)
PEEP RESPIRATORY: 8 CM[H2O]
PEEP RESPIRATORY: 8 CM[H2O]
PH BLDA: 7.34 PH UNITS (ref 7.35–7.45)
PH BLDA: 7.34 PH UNITS (ref 7.35–7.6)
PH BLDA: 7.38 PH UNITS (ref 7.35–7.6)
PH BLDA: 7.39 PH UNITS (ref 7.35–7.6)
PH BLDA: 7.41 PH UNITS (ref 7.35–7.6)
PH BLDA: 7.41 PH UNITS (ref 7.35–7.6)
PH BLDA: 7.43 PH UNITS (ref 7.35–7.45)
PH BLDA: 7.44 PH UNITS (ref 7.35–7.6)
PHOSPHATE SERPL-MCNC: 2 MG/DL (ref 2.5–4.5)
PHOSPHATE SERPL-MCNC: 3.7 MG/DL (ref 2.5–4.5)
PLATELET # BLD AUTO: 130 10*3/MM3 (ref 140–450)
PLATELET # BLD AUTO: 146 10*3/MM3 (ref 140–450)
PLATELET # BLD AUTO: 160 10*3/MM3 (ref 140–450)
PMV BLD AUTO: 8.9 FL (ref 6–12)
PMV BLD AUTO: 9.5 FL (ref 6–12)
PMV BLD AUTO: 9.5 FL (ref 6–12)
PO2 BLD: 294 MM[HG] (ref 0–500)
PO2 BLD: 297 MM[HG] (ref 0–500)
PO2 BLDA: 118.6 MM HG (ref 80–100)
PO2 BLDA: 294 MM HG (ref 80–100)
PO2 BLDA: 370 MMHG (ref 80–105)
PO2 BLDA: 376 MMHG (ref 80–105)
PO2 BLDA: 43 MMHG (ref 80–105)
PO2 BLDA: 438 MMHG (ref 80–105)
PO2 BLDA: 451 MMHG (ref 80–105)
PO2 BLDA: 553 MMHG (ref 80–105)
POTASSIUM BLDA-SCNC: 4 MMOL/L (ref 3.5–4.9)
POTASSIUM BLDA-SCNC: 4.9 MMOL/L (ref 3.5–4.9)
POTASSIUM BLDA-SCNC: 5 MMOL/L (ref 3.5–4.9)
POTASSIUM BLDA-SCNC: 5.2 MMOL/L (ref 3.5–4.9)
POTASSIUM BLDA-SCNC: 5.3 MMOL/L (ref 3.5–4.9)
POTASSIUM BLDA-SCNC: 5.8 MMOL/L (ref 3.5–4.9)
POTASSIUM SERPL-SCNC: 3.9 MMOL/L (ref 3.5–5.2)
POTASSIUM SERPL-SCNC: 4.2 MMOL/L (ref 3.5–5.2)
PROTHROMBIN TIME: 16.6 SECONDS (ref 11.7–14.2)
PROTHROMBIN TIME: 28.1 SECONDS (ref 11.7–14.2)
PSV: 8 CMH2O
RBC # BLD AUTO: 3.04 10*6/MM3 (ref 4.14–5.8)
RBC # BLD AUTO: 3.71 10*6/MM3 (ref 4.14–5.8)
RBC # BLD AUTO: 4.24 10*6/MM3 (ref 4.14–5.8)
SAO2 % BLDA: 100 % (ref 95–98)
SAO2 % BLDA: 74 % (ref 95–98)
SAO2 % BLDCOA: 98.4 % (ref 92–98.5)
SAO2 % BLDCOA: 99.9 % (ref 92–98.5)
SET MECH RESP RATE: 16
SODIUM SERPL-SCNC: 142 MMOL/L (ref 136–145)
SODIUM SERPL-SCNC: 143 MMOL/L (ref 136–145)
TOTAL RATE: 15 BREATHS/MINUTE
TOTAL RATE: 16 BREATHS/MINUTE
VENTILATOR MODE: ABNORMAL
VENTILATOR MODE: AC
VT ON VENT VENT: 500 ML
WBC NRBC COR # BLD AUTO: 13.17 10*3/MM3 (ref 3.4–10.8)
WBC NRBC COR # BLD AUTO: 14.65 10*3/MM3 (ref 3.4–10.8)
WBC NRBC COR # BLD AUTO: 6.69 10*3/MM3 (ref 3.4–10.8)

## 2024-07-30 PROCEDURE — 85347 COAGULATION TIME ACTIVATED: CPT

## 2024-07-30 PROCEDURE — P9041 ALBUMIN (HUMAN),5%, 50ML: HCPCS | Performed by: NURSE PRACTITIONER

## 2024-07-30 PROCEDURE — 85610 PROTHROMBIN TIME: CPT | Performed by: NURSE PRACTITIONER

## 2024-07-30 PROCEDURE — 86901 BLOOD TYPING SEROLOGIC RH(D): CPT

## 2024-07-30 PROCEDURE — 25010000002 ALBUMIN HUMAN 25% PER 50 ML

## 2024-07-30 PROCEDURE — 25010000002 MAGNESIUM SULFATE PER 500 MG OF MAGNESIUM: Performed by: ANESTHESIOLOGY

## 2024-07-30 PROCEDURE — 86900 BLOOD TYPING SEROLOGIC ABO: CPT

## 2024-07-30 PROCEDURE — 82803 BLOOD GASES ANY COMBINATION: CPT | Performed by: NURSE PRACTITIONER

## 2024-07-30 PROCEDURE — 85014 HEMATOCRIT: CPT

## 2024-07-30 PROCEDURE — P9047 ALBUMIN (HUMAN), 25%, 50ML: HCPCS

## 2024-07-30 PROCEDURE — 25010000002 MAGNESIUM SULFATE IN D5W 1G/100ML (PREMIX) 1-5 GM/100ML-% SOLUTION: Performed by: NURSE PRACTITIONER

## 2024-07-30 PROCEDURE — C1713 ANCHOR/SCREW BN/BN,TIS/BN: HCPCS | Performed by: THORACIC SURGERY (CARDIOTHORACIC VASCULAR SURGERY)

## 2024-07-30 PROCEDURE — 021109W BYPASS CORONARY ARTERY, TWO ARTERIES FROM AORTA WITH AUTOLOGOUS VENOUS TISSUE, OPEN APPROACH: ICD-10-PCS | Performed by: THORACIC SURGERY (CARDIOTHORACIC VASCULAR SURGERY)

## 2024-07-30 PROCEDURE — 33268 EXCL LAA OPN OTH PX ANY METH: CPT | Performed by: THORACIC SURGERY (CARDIOTHORACIC VASCULAR SURGERY)

## 2024-07-30 PROCEDURE — 82330 ASSAY OF CALCIUM: CPT | Performed by: NURSE PRACTITIONER

## 2024-07-30 PROCEDURE — B24BZZ4 ULTRASONOGRAPHY OF HEART WITH AORTA, TRANSESOPHAGEAL: ICD-10-PCS | Performed by: ANESTHESIOLOGY

## 2024-07-30 PROCEDURE — 85018 HEMOGLOBIN: CPT

## 2024-07-30 PROCEDURE — 0 PROTAMINE SULFATE PER 10 MG: Performed by: THORACIC SURGERY (CARDIOTHORACIC VASCULAR SURGERY)

## 2024-07-30 PROCEDURE — 71045 X-RAY EXAM CHEST 1 VIEW: CPT

## 2024-07-30 PROCEDURE — 94761 N-INVAS EAR/PLS OXIMETRY MLT: CPT

## 2024-07-30 PROCEDURE — 25810000003 SODIUM CHLORIDE 0.9 % SOLUTION: Performed by: NURSE PRACTITIONER

## 2024-07-30 PROCEDURE — 25010000002 ONDANSETRON PER 1 MG: Performed by: NURSE PRACTITIONER

## 2024-07-30 PROCEDURE — 82803 BLOOD GASES ANY COMBINATION: CPT

## 2024-07-30 PROCEDURE — 25010000002 CEFAZOLIN PER 500 MG: Performed by: NURSE PRACTITIONER

## 2024-07-30 PROCEDURE — 5A1221Z PERFORMANCE OF CARDIAC OUTPUT, CONTINUOUS: ICD-10-PCS | Performed by: THORACIC SURGERY (CARDIOTHORACIC VASCULAR SURGERY)

## 2024-07-30 PROCEDURE — 85730 THROMBOPLASTIN TIME PARTIAL: CPT | Performed by: THORACIC SURGERY (CARDIOTHORACIC VASCULAR SURGERY)

## 2024-07-30 PROCEDURE — 82948 REAGENT STRIP/BLOOD GLUCOSE: CPT

## 2024-07-30 PROCEDURE — 25010000002 HEPARIN (PORCINE) PER 1000 UNITS: Performed by: ANESTHESIOLOGY

## 2024-07-30 PROCEDURE — 85610 PROTHROMBIN TIME: CPT | Performed by: THORACIC SURGERY (CARDIOTHORACIC VASCULAR SURGERY)

## 2024-07-30 PROCEDURE — 25010000002 VANCOMYCIN 1 G RECONSTITUTED SOLUTION

## 2024-07-30 PROCEDURE — 02L70ZK OCCLUSION OF LEFT ATRIAL APPENDAGE, OPEN APPROACH: ICD-10-PCS | Performed by: THORACIC SURGERY (CARDIOTHORACIC VASCULAR SURGERY)

## 2024-07-30 PROCEDURE — 25010000002 ALBUMIN HUMAN 5% PER 50 ML: Performed by: NURSE PRACTITIONER

## 2024-07-30 PROCEDURE — 25010000002 METOCLOPRAMIDE PER 10 MG: Performed by: NURSE PRACTITIONER

## 2024-07-30 PROCEDURE — 63710000001 INSULIN REGULAR HUMAN PER 5 UNITS: Performed by: ANESTHESIOLOGY

## 2024-07-30 PROCEDURE — 25010000002 HEPARIN (PORCINE) PER 1000 UNITS: Performed by: THORACIC SURGERY (CARDIOTHORACIC VASCULAR SURGERY)

## 2024-07-30 PROCEDURE — 02100Z9 BYPASS CORONARY ARTERY, ONE ARTERY FROM LEFT INTERNAL MAMMARY, OPEN APPROACH: ICD-10-PCS | Performed by: THORACIC SURGERY (CARDIOTHORACIC VASCULAR SURGERY)

## 2024-07-30 PROCEDURE — 06BP4ZZ EXCISION OF RIGHT SAPHENOUS VEIN, PERCUTANEOUS ENDOSCOPIC APPROACH: ICD-10-PCS | Performed by: THORACIC SURGERY (CARDIOTHORACIC VASCULAR SURGERY)

## 2024-07-30 PROCEDURE — 83735 ASSAY OF MAGNESIUM: CPT | Performed by: NURSE PRACTITIONER

## 2024-07-30 PROCEDURE — 25810000003 SODIUM CHLORIDE 0.9 % SOLUTION 250 ML FLEX CONT: Performed by: ANESTHESIOLOGY

## 2024-07-30 PROCEDURE — 25010000002 EPINEPHRINE PER 0.1 MG: Performed by: ANESTHESIOLOGY

## 2024-07-30 PROCEDURE — 80069 RENAL FUNCTION PANEL: CPT | Performed by: NURSE PRACTITIONER

## 2024-07-30 PROCEDURE — 93010 ELECTROCARDIOGRAM REPORT: CPT | Performed by: INTERNAL MEDICINE

## 2024-07-30 PROCEDURE — 94799 UNLISTED PULMONARY SVC/PX: CPT

## 2024-07-30 PROCEDURE — 25010000002 ACETAMINOPHEN 10 MG/ML SOLUTION: Performed by: NURSE PRACTITIONER

## 2024-07-30 PROCEDURE — 85730 THROMBOPLASTIN TIME PARTIAL: CPT | Performed by: NURSE PRACTITIONER

## 2024-07-30 PROCEDURE — C1751 CATH, INF, PER/CENT/MIDLINE: HCPCS | Performed by: ANESTHESIOLOGY

## 2024-07-30 PROCEDURE — 25010000002 PROPOFOL 10 MG/ML EMULSION: Performed by: ANESTHESIOLOGY

## 2024-07-30 PROCEDURE — 25010000002 MILRINONE LACTATE 10 MG/10ML SOLUTION 10 ML VIAL: Performed by: ANESTHESIOLOGY

## 2024-07-30 PROCEDURE — 25010000002 CEFAZOLIN PER 500 MG: Performed by: PHYSICIAN ASSISTANT

## 2024-07-30 PROCEDURE — 93318 ECHO TRANSESOPHAGEAL INTRAOP: CPT | Performed by: ANESTHESIOLOGY

## 2024-07-30 PROCEDURE — 33519 CABG ARTERY-VEIN THREE: CPT | Performed by: THORACIC SURGERY (CARDIOTHORACIC VASCULAR SURGERY)

## 2024-07-30 PROCEDURE — 85025 COMPLETE CBC W/AUTO DIFF WBC: CPT | Performed by: NURSE PRACTITIONER

## 2024-07-30 PROCEDURE — 82947 ASSAY GLUCOSE BLOOD QUANT: CPT

## 2024-07-30 PROCEDURE — 85027 COMPLETE CBC AUTOMATED: CPT | Performed by: THORACIC SURGERY (CARDIOTHORACIC VASCULAR SURGERY)

## 2024-07-30 PROCEDURE — C1889 IMPLANT/INSERT DEVICE, NOC: HCPCS | Performed by: THORACIC SURGERY (CARDIOTHORACIC VASCULAR SURGERY)

## 2024-07-30 PROCEDURE — 25010000002 MIDAZOLAM PER 1 MG: Performed by: ANESTHESIOLOGY

## 2024-07-30 PROCEDURE — 3E080GC INTRODUCTION OF OTHER THERAPEUTIC SUBSTANCE INTO HEART, OPEN APPROACH: ICD-10-PCS | Performed by: THORACIC SURGERY (CARDIOTHORACIC VASCULAR SURGERY)

## 2024-07-30 PROCEDURE — 85027 COMPLETE CBC AUTOMATED: CPT | Performed by: NURSE PRACTITIONER

## 2024-07-30 PROCEDURE — 94002 VENT MGMT INPAT INIT DAY: CPT

## 2024-07-30 PROCEDURE — 25810000003 SODIUM CHLORIDE 0.9 % SOLUTION

## 2024-07-30 PROCEDURE — 93005 ELECTROCARDIOGRAM TRACING: CPT | Performed by: NURSE PRACTITIONER

## 2024-07-30 PROCEDURE — 25010000002 HEPARIN (PORCINE) PER 1000 UNITS

## 2024-07-30 PROCEDURE — 85384 FIBRINOGEN ACTIVITY: CPT | Performed by: NURSE PRACTITIONER

## 2024-07-30 PROCEDURE — 33508 ENDOSCOPIC VEIN HARVEST: CPT | Performed by: THORACIC SURGERY (CARDIOTHORACIC VASCULAR SURGERY)

## 2024-07-30 PROCEDURE — 33533 CABG ARTERIAL SINGLE: CPT | Performed by: THORACIC SURGERY (CARDIOTHORACIC VASCULAR SURGERY)

## 2024-07-30 PROCEDURE — 25010000002 PAPAVERINE PER 60 MG: Performed by: THORACIC SURGERY (CARDIOTHORACIC VASCULAR SURGERY)

## 2024-07-30 PROCEDURE — 25010000002 FENTANYL CITRATE (PF) 250 MCG/5ML SOLUTION: Performed by: ANESTHESIOLOGY

## 2024-07-30 PROCEDURE — 02100AW BYPASS CORONARY ARTERY, ONE ARTERY FROM AORTA WITH AUTOLOGOUS ARTERIAL TISSUE, OPEN APPROACH: ICD-10-PCS | Performed by: THORACIC SURGERY (CARDIOTHORACIC VASCULAR SURGERY)

## 2024-07-30 PROCEDURE — 02UG0JZ SUPPLEMENT MITRAL VALVE WITH SYNTHETIC SUBSTITUTE, OPEN APPROACH: ICD-10-PCS | Performed by: THORACIC SURGERY (CARDIOTHORACIC VASCULAR SURGERY)

## 2024-07-30 PROCEDURE — A4648 IMPLANTABLE TISSUE MARKER: HCPCS | Performed by: THORACIC SURGERY (CARDIOTHORACIC VASCULAR SURGERY)

## 2024-07-30 PROCEDURE — 85384 FIBRINOGEN ACTIVITY: CPT | Performed by: THORACIC SURGERY (CARDIOTHORACIC VASCULAR SURGERY)

## 2024-07-30 DEVICE — RNG ANULPLSTY PROFILE3D 28MM: Type: IMPLANTABLE DEVICE | Site: HEART | Status: FUNCTIONAL

## 2024-07-30 DEVICE — HEMOST ABS SURGIFOAM SZ100 8X12 10MM: Type: IMPLANTABLE DEVICE | Site: HEART | Status: FUNCTIONAL

## 2024-07-30 DEVICE — DEV CLS STERN FIBERTAPE W/BLNT/NDL: Type: IMPLANTABLE DEVICE | Site: STERNUM | Status: FUNCTIONAL

## 2024-07-30 DEVICE — COR-KNOT® QUICK LOAD® SINGLES
Type: IMPLANTABLE DEVICE | Site: HEART | Status: FUNCTIONAL
Brand: COR-KNOT® QUICK LOAD®

## 2024-07-30 DEVICE — COR-KNOT MINI® COMBO KITBASE PACKAGE TYPE - KITEACH STERILE PACKAGE KIT CONTAINS (2) SINGLE PATIENT USE COR-KNOT MINI® DEVICES AND (12) COR-KNOT® QUICK LOADS®.
Type: IMPLANTABLE DEVICE | Site: HEART | Status: FUNCTIONAL
Brand: COR-KNOT MINI®

## 2024-07-30 RX ORDER — ASPIRIN 81 MG/1
81 TABLET ORAL DAILY
Status: DISCONTINUED | OUTPATIENT
Start: 2024-07-31 | End: 2024-08-04 | Stop reason: HOSPADM

## 2024-07-30 RX ORDER — HEPARIN SODIUM 5000 [USP'U]/ML
INJECTION, SOLUTION INTRAVENOUS; SUBCUTANEOUS AS NEEDED
Status: DISCONTINUED | OUTPATIENT
Start: 2024-07-30 | End: 2024-07-30 | Stop reason: HOSPADM

## 2024-07-30 RX ORDER — AMOXICILLIN 250 MG
2 CAPSULE ORAL NIGHTLY
Status: DISCONTINUED | OUTPATIENT
Start: 2024-07-31 | End: 2024-08-04 | Stop reason: HOSPADM

## 2024-07-30 RX ORDER — ACETAMINOPHEN 325 MG/1
650 TABLET ORAL EVERY 4 HOURS PRN
Status: DISCONTINUED | OUTPATIENT
Start: 2024-07-31 | End: 2024-08-04 | Stop reason: HOSPADM

## 2024-07-30 RX ORDER — FENTANYL CITRATE 50 UG/ML
INJECTION, SOLUTION INTRAMUSCULAR; INTRAVENOUS AS NEEDED
Status: DISCONTINUED | OUTPATIENT
Start: 2024-07-30 | End: 2024-07-30 | Stop reason: SURG

## 2024-07-30 RX ORDER — CHLORHEXIDINE GLUCONATE ORAL RINSE 1.2 MG/ML
15 SOLUTION DENTAL ONCE
Status: DISCONTINUED | OUTPATIENT
Start: 2024-07-30 | End: 2024-07-30 | Stop reason: HOSPADM

## 2024-07-30 RX ORDER — ACETAMINOPHEN 650 MG/1
650 SUPPOSITORY RECTAL EVERY 4 HOURS PRN
Status: DISCONTINUED | OUTPATIENT
Start: 2024-07-31 | End: 2024-08-04 | Stop reason: HOSPADM

## 2024-07-30 RX ORDER — MORPHINE SULFATE 2 MG/ML
1 INJECTION, SOLUTION INTRAMUSCULAR; INTRAVENOUS EVERY 4 HOURS PRN
Status: DISCONTINUED | OUTPATIENT
Start: 2024-07-30 | End: 2024-08-04 | Stop reason: HOSPADM

## 2024-07-30 RX ORDER — DOPAMINE HYDROCHLORIDE 160 MG/100ML
2-20 INJECTION, SOLUTION INTRAVENOUS CONTINUOUS PRN
Status: DISCONTINUED | OUTPATIENT
Start: 2024-07-30 | End: 2024-08-01

## 2024-07-30 RX ORDER — NITROGLYCERIN 0.4 MG/1
0.4 TABLET SUBLINGUAL
Status: DISCONTINUED | OUTPATIENT
Start: 2024-07-30 | End: 2024-08-04 | Stop reason: HOSPADM

## 2024-07-30 RX ORDER — IBUPROFEN 600 MG/1
1 TABLET ORAL
Status: DISCONTINUED | OUTPATIENT
Start: 2024-07-30 | End: 2024-07-31

## 2024-07-30 RX ORDER — HEPARIN SODIUM 1000 [USP'U]/ML
INJECTION, SOLUTION INTRAVENOUS; SUBCUTANEOUS AS NEEDED
Status: DISCONTINUED | OUTPATIENT
Start: 2024-07-30 | End: 2024-07-30 | Stop reason: SURG

## 2024-07-30 RX ORDER — PANTOPRAZOLE SODIUM 40 MG/10ML
40 INJECTION, POWDER, LYOPHILIZED, FOR SOLUTION INTRAVENOUS ONCE
Status: DISCONTINUED | OUTPATIENT
Start: 2024-07-30 | End: 2024-08-04 | Stop reason: HOSPADM

## 2024-07-30 RX ORDER — DEXTROSE MONOHYDRATE 25 G/50ML
10-50 INJECTION, SOLUTION INTRAVENOUS
Status: DISCONTINUED | OUTPATIENT
Start: 2024-07-30 | End: 2024-07-31

## 2024-07-30 RX ORDER — NALOXONE HCL 0.4 MG/ML
0.4 VIAL (ML) INJECTION
Status: DISCONTINUED | OUTPATIENT
Start: 2024-07-30 | End: 2024-08-04 | Stop reason: HOSPADM

## 2024-07-30 RX ORDER — DEXMEDETOMIDINE HYDROCHLORIDE 4 UG/ML
.2-1.5 INJECTION, SOLUTION INTRAVENOUS
Status: DISCONTINUED | OUTPATIENT
Start: 2024-07-30 | End: 2024-07-31

## 2024-07-30 RX ORDER — ACETAMINOPHEN 160 MG/5ML
650 SOLUTION ORAL EVERY 4 HOURS PRN
Status: DISCONTINUED | OUTPATIENT
Start: 2024-07-31 | End: 2024-08-04 | Stop reason: HOSPADM

## 2024-07-30 RX ORDER — NICOTINE POLACRILEX 4 MG
15 LOZENGE BUCCAL
Status: DISCONTINUED | OUTPATIENT
Start: 2024-07-30 | End: 2024-07-31

## 2024-07-30 RX ORDER — ALBUMIN, HUMAN INJ 5% 5 %
1500 SOLUTION INTRAVENOUS AS NEEDED
Status: DISPENSED | OUTPATIENT
Start: 2024-07-30 | End: 2024-07-31

## 2024-07-30 RX ORDER — LIDOCAINE HYDROCHLORIDE 20 MG/ML
INJECTION, SOLUTION INFILTRATION; PERINEURAL AS NEEDED
Status: DISCONTINUED | OUTPATIENT
Start: 2024-07-30 | End: 2024-07-30 | Stop reason: SURG

## 2024-07-30 RX ORDER — CHLORHEXIDINE GLUCONATE ORAL RINSE 1.2 MG/ML
15 SOLUTION DENTAL EVERY 12 HOURS
Status: DISCONTINUED | OUTPATIENT
Start: 2024-07-30 | End: 2024-08-04 | Stop reason: HOSPADM

## 2024-07-30 RX ORDER — BISACODYL 5 MG/1
10 TABLET, DELAYED RELEASE ORAL DAILY PRN
Status: DISCONTINUED | OUTPATIENT
Start: 2024-07-30 | End: 2024-08-04 | Stop reason: HOSPADM

## 2024-07-30 RX ORDER — NOREPINEPHRINE BITARTRATE 1 MG/ML
INJECTION, SOLUTION INTRAVENOUS AS NEEDED
Status: DISCONTINUED | OUTPATIENT
Start: 2024-07-30 | End: 2024-07-30 | Stop reason: SURG

## 2024-07-30 RX ORDER — SODIUM CHLORIDE 9 MG/ML
30 INJECTION, SOLUTION INTRAVENOUS CONTINUOUS
Status: DISCONTINUED | OUTPATIENT
Start: 2024-07-30 | End: 2024-08-04 | Stop reason: HOSPADM

## 2024-07-30 RX ORDER — HYDROCODONE BITARTRATE AND ACETAMINOPHEN 5; 325 MG/1; MG/1
2 TABLET ORAL EVERY 4 HOURS PRN
Status: DISCONTINUED | OUTPATIENT
Start: 2024-07-30 | End: 2024-08-04 | Stop reason: HOSPADM

## 2024-07-30 RX ORDER — ATORVASTATIN CALCIUM 20 MG/1
40 TABLET, FILM COATED ORAL NIGHTLY
Status: DISCONTINUED | OUTPATIENT
Start: 2024-07-30 | End: 2024-08-04 | Stop reason: HOSPADM

## 2024-07-30 RX ORDER — CYCLOBENZAPRINE HCL 10 MG
10 TABLET ORAL EVERY 8 HOURS PRN
Status: DISCONTINUED | OUTPATIENT
Start: 2024-07-31 | End: 2024-08-02

## 2024-07-30 RX ORDER — MIDAZOLAM HYDROCHLORIDE 1 MG/ML
2 INJECTION INTRAMUSCULAR; INTRAVENOUS
Status: DISCONTINUED | OUTPATIENT
Start: 2024-07-30 | End: 2024-07-31

## 2024-07-30 RX ORDER — ACETAMINOPHEN 160 MG/5ML
650 SOLUTION ORAL EVERY 4 HOURS
Status: ACTIVE | OUTPATIENT
Start: 2024-07-30 | End: 2024-07-31

## 2024-07-30 RX ORDER — ALPRAZOLAM 0.25 MG/1
0.25 TABLET ORAL EVERY 8 HOURS PRN
Status: DISCONTINUED | OUTPATIENT
Start: 2024-07-30 | End: 2024-08-02

## 2024-07-30 RX ORDER — PHENYLEPHRINE HCL IN 0.9% NACL 0.5 MG/5ML
.2-2 SYRINGE (ML) INTRAVENOUS CONTINUOUS PRN
Status: DISCONTINUED | OUTPATIENT
Start: 2024-07-30 | End: 2024-07-31

## 2024-07-30 RX ORDER — MILRINONE LACTATE 0.2 MG/ML
.25-.375 INJECTION, SOLUTION INTRAVENOUS CONTINUOUS PRN
Status: DISCONTINUED | OUTPATIENT
Start: 2024-07-30 | End: 2024-07-31

## 2024-07-30 RX ORDER — SODIUM CHLORIDE 9 MG/ML
INJECTION, SOLUTION INTRAVENOUS CONTINUOUS PRN
Status: DISCONTINUED | OUTPATIENT
Start: 2024-07-30 | End: 2024-07-30 | Stop reason: SURG

## 2024-07-30 RX ORDER — NITROGLYCERIN 20 MG/100ML
5-200 INJECTION INTRAVENOUS
Status: DISCONTINUED | OUTPATIENT
Start: 2024-07-30 | End: 2024-07-31

## 2024-07-30 RX ORDER — BISACODYL 10 MG
10 SUPPOSITORY, RECTAL RECTAL DAILY PRN
Status: DISCONTINUED | OUTPATIENT
Start: 2024-07-31 | End: 2024-08-04 | Stop reason: HOSPADM

## 2024-07-30 RX ORDER — POLYETHYLENE GLYCOL 3350 17 G/17G
17 POWDER, FOR SOLUTION ORAL DAILY PRN
Status: DISCONTINUED | OUTPATIENT
Start: 2024-07-30 | End: 2024-08-04 | Stop reason: HOSPADM

## 2024-07-30 RX ORDER — ACETAMINOPHEN 325 MG/1
650 TABLET ORAL EVERY 4 HOURS
Status: ACTIVE | OUTPATIENT
Start: 2024-07-30 | End: 2024-07-31

## 2024-07-30 RX ORDER — MAGNESIUM SULFATE HEPTAHYDRATE 500 MG/ML
INJECTION, SOLUTION INTRAMUSCULAR; INTRAVENOUS AS NEEDED
Status: DISCONTINUED | OUTPATIENT
Start: 2024-07-30 | End: 2024-07-30 | Stop reason: SURG

## 2024-07-30 RX ORDER — METOCLOPRAMIDE HYDROCHLORIDE 5 MG/ML
10 INJECTION INTRAMUSCULAR; INTRAVENOUS EVERY 6 HOURS
Status: COMPLETED | OUTPATIENT
Start: 2024-07-30 | End: 2024-07-31

## 2024-07-30 RX ORDER — MAGNESIUM SULFATE 1 G/100ML
1 INJECTION INTRAVENOUS EVERY 8 HOURS
Status: DISPENSED | OUTPATIENT
Start: 2024-07-30 | End: 2024-07-31

## 2024-07-30 RX ORDER — MIDAZOLAM HYDROCHLORIDE 1 MG/ML
INJECTION INTRAMUSCULAR; INTRAVENOUS AS NEEDED
Status: DISCONTINUED | OUTPATIENT
Start: 2024-07-30 | End: 2024-07-30 | Stop reason: SURG

## 2024-07-30 RX ORDER — NOREPINEPHRINE BITARTRATE 0.03 MG/ML
.02-.2 INJECTION, SOLUTION INTRAVENOUS CONTINUOUS PRN
Status: DISCONTINUED | OUTPATIENT
Start: 2024-07-30 | End: 2024-07-31

## 2024-07-30 RX ORDER — ACETAMINOPHEN 10 MG/ML
1000 INJECTION, SOLUTION INTRAVENOUS EVERY 8 HOURS
Status: COMPLETED | OUTPATIENT
Start: 2024-07-30 | End: 2024-07-31

## 2024-07-30 RX ORDER — ROCURONIUM BROMIDE 10 MG/ML
INJECTION, SOLUTION INTRAVENOUS AS NEEDED
Status: DISCONTINUED | OUTPATIENT
Start: 2024-07-30 | End: 2024-07-30 | Stop reason: SURG

## 2024-07-30 RX ORDER — CHLORHEXIDINE GLUCONATE 500 MG/1
1 CLOTH TOPICAL EVERY 12 HOURS PRN
Status: DISCONTINUED | OUTPATIENT
Start: 2024-07-30 | End: 2024-07-30 | Stop reason: HOSPADM

## 2024-07-30 RX ORDER — FAMOTIDINE 10 MG/ML
20 INJECTION, SOLUTION INTRAVENOUS ONCE
Status: COMPLETED | OUTPATIENT
Start: 2024-07-30 | End: 2024-07-30

## 2024-07-30 RX ORDER — PAPAVERINE HYDROCHLORIDE 30 MG/ML
INJECTION INTRAMUSCULAR; INTRAVENOUS AS NEEDED
Status: DISCONTINUED | OUTPATIENT
Start: 2024-07-30 | End: 2024-07-30 | Stop reason: HOSPADM

## 2024-07-30 RX ORDER — CALCIUM CHLORIDE 100 MG/ML
INJECTION INTRAVENOUS; INTRAVENTRICULAR AS NEEDED
Status: DISCONTINUED | OUTPATIENT
Start: 2024-07-30 | End: 2024-07-30 | Stop reason: SURG

## 2024-07-30 RX ORDER — PROTAMINE SULFATE 10 MG/ML
INJECTION, SOLUTION INTRAVENOUS AS NEEDED
Status: DISCONTINUED | OUTPATIENT
Start: 2024-07-30 | End: 2024-07-30 | Stop reason: HOSPADM

## 2024-07-30 RX ORDER — ENOXAPARIN SODIUM 100 MG/ML
40 INJECTION SUBCUTANEOUS DAILY
Status: DISCONTINUED | OUTPATIENT
Start: 2024-07-31 | End: 2024-08-04 | Stop reason: HOSPADM

## 2024-07-30 RX ORDER — OXYCODONE HYDROCHLORIDE 5 MG/1
10 TABLET ORAL EVERY 4 HOURS PRN
Status: DISCONTINUED | OUTPATIENT
Start: 2024-07-30 | End: 2024-08-02

## 2024-07-30 RX ORDER — AMINOCAPROIC ACID 250 MG/ML
INJECTION, SOLUTION INTRAVENOUS AS NEEDED
Status: DISCONTINUED | OUTPATIENT
Start: 2024-07-30 | End: 2024-07-30 | Stop reason: SURG

## 2024-07-30 RX ORDER — ONDANSETRON 2 MG/ML
4 INJECTION INTRAMUSCULAR; INTRAVENOUS EVERY 6 HOURS PRN
Status: DISCONTINUED | OUTPATIENT
Start: 2024-07-30 | End: 2024-08-04 | Stop reason: HOSPADM

## 2024-07-30 RX ORDER — PANTOPRAZOLE SODIUM 40 MG/1
40 TABLET, DELAYED RELEASE ORAL EVERY MORNING
Status: DISCONTINUED | OUTPATIENT
Start: 2024-07-31 | End: 2024-08-04 | Stop reason: HOSPADM

## 2024-07-30 RX ORDER — ACETAMINOPHEN 650 MG/1
650 SUPPOSITORY RECTAL EVERY 4 HOURS
Status: ACTIVE | OUTPATIENT
Start: 2024-07-30 | End: 2024-07-31

## 2024-07-30 RX ORDER — MORPHINE SULFATE 2 MG/ML
4 INJECTION, SOLUTION INTRAMUSCULAR; INTRAVENOUS
Status: DISCONTINUED | OUTPATIENT
Start: 2024-07-30 | End: 2024-07-31

## 2024-07-30 RX ORDER — MEPERIDINE HYDROCHLORIDE 25 MG/ML
25 INJECTION INTRAMUSCULAR; INTRAVENOUS; SUBCUTANEOUS EVERY 4 HOURS PRN
Status: ACTIVE | OUTPATIENT
Start: 2024-07-30 | End: 2024-07-31

## 2024-07-30 RX ADMIN — FENTANYL CITRATE 100 MCG: 0.05 INJECTION, SOLUTION INTRAMUSCULAR; INTRAVENOUS at 07:52

## 2024-07-30 RX ADMIN — SODIUM CHLORIDE 2 G: 900 INJECTION INTRAVENOUS at 11:08

## 2024-07-30 RX ADMIN — SODIUM CHLORIDE 0.5 MCG/KG/HR: 9 INJECTION, SOLUTION INTRAVENOUS at 07:19

## 2024-07-30 RX ADMIN — FENTANYL CITRATE 100 MCG: 0.05 INJECTION, SOLUTION INTRAMUSCULAR; INTRAVENOUS at 11:12

## 2024-07-30 RX ADMIN — SODIUM CHLORIDE: 9 INJECTION, SOLUTION INTRAVENOUS at 06:46

## 2024-07-30 RX ADMIN — METOCLOPRAMIDE 10 MG: 5 INJECTION, SOLUTION INTRAMUSCULAR; INTRAVENOUS at 13:20

## 2024-07-30 RX ADMIN — OXYCODONE HYDROCHLORIDE 10 MG: 5 TABLET ORAL at 18:14

## 2024-07-30 RX ADMIN — SODIUM CHLORIDE 2 G: 900 INJECTION INTRAVENOUS at 07:18

## 2024-07-30 RX ADMIN — METOCLOPRAMIDE 10 MG: 5 INJECTION, SOLUTION INTRAMUSCULAR; INTRAVENOUS at 18:32

## 2024-07-30 RX ADMIN — DEXMEDETOMIDINE HYDROCHLORIDE 0.5 MCG/KG/HR: 4 INJECTION, SOLUTION INTRAVENOUS at 14:51

## 2024-07-30 RX ADMIN — ALBUMIN (HUMAN) 500 ML: 12.5 INJECTION, SOLUTION INTRAVENOUS at 13:13

## 2024-07-30 RX ADMIN — CALCIUM CHLORIDE 500 MG: 100 INJECTION INTRAVENOUS; INTRAVENTRICULAR at 11:30

## 2024-07-30 RX ADMIN — MAGNESIUM SULFATE IN DEXTROSE 1 G: 10 INJECTION, SOLUTION INTRAVENOUS at 20:17

## 2024-07-30 RX ADMIN — ROCURONIUM BROMIDE 20 MG: 10 INJECTION, SOLUTION INTRAVENOUS at 08:54

## 2024-07-30 RX ADMIN — EPINEPHRINE 10 MCG: 1 INJECTION, SOLUTION, CONCENTRATE INTRAVENOUS at 08:13

## 2024-07-30 RX ADMIN — MAGNESIUM SULFATE HEPTAHYDRATE 2 G: 500 INJECTION, SOLUTION INTRAMUSCULAR; INTRAVENOUS at 10:43

## 2024-07-30 RX ADMIN — ALBUMIN (HUMAN) 500 ML: 12.5 INJECTION, SOLUTION INTRAVENOUS at 14:52

## 2024-07-30 RX ADMIN — LIDOCAINE HYDROCHLORIDE 80 MG: 20 INJECTION, SOLUTION INFILTRATION; PERINEURAL at 06:58

## 2024-07-30 RX ADMIN — EPINEPHRINE 10 MCG: 1 INJECTION, SOLUTION, CONCENTRATE INTRAVENOUS at 07:38

## 2024-07-30 RX ADMIN — EPINEPHRINE 10 MCG: 1 INJECTION, SOLUTION, CONCENTRATE INTRAVENOUS at 07:11

## 2024-07-30 RX ADMIN — SODIUM CHLORIDE 0.25 MCG/KG/MIN: 0.9 INJECTION, SOLUTION INTRAVENOUS at 10:01

## 2024-07-30 RX ADMIN — PROPOFOL 25 MCG/KG/MIN: 10 INJECTION, EMULSION INTRAVENOUS at 07:19

## 2024-07-30 RX ADMIN — ATORVASTATIN CALCIUM 40 MG: 20 TABLET, FILM COATED ORAL at 20:14

## 2024-07-30 RX ADMIN — MUPIROCIN 1 APPLICATION: 20 OINTMENT TOPICAL at 20:14

## 2024-07-30 RX ADMIN — ROCURONIUM BROMIDE 30 MG: 10 INJECTION, SOLUTION INTRAVENOUS at 11:05

## 2024-07-30 RX ADMIN — 0.12% CHLORHEXIDINE GLUCONATE 15 ML: 1.2 RINSE ORAL at 20:14

## 2024-07-30 RX ADMIN — FENTANYL CITRATE 200 MCG: 0.05 INJECTION, SOLUTION INTRAMUSCULAR; INTRAVENOUS at 06:58

## 2024-07-30 RX ADMIN — HEPARIN SODIUM 25000 UNITS: 1000 INJECTION, SOLUTION INTRAVENOUS; SUBCUTANEOUS at 08:31

## 2024-07-30 RX ADMIN — CYCLOBENZAPRINE HYDROCHLORIDE 10 MG: 10 TABLET, FILM COATED ORAL at 20:17

## 2024-07-30 RX ADMIN — ACETAMINOPHEN 1000 MG: 10 INJECTION INTRAVENOUS at 13:13

## 2024-07-30 RX ADMIN — ROCURONIUM BROMIDE 80 MG: 10 INJECTION, SOLUTION INTRAVENOUS at 06:58

## 2024-07-30 RX ADMIN — CALCIUM CHLORIDE 250 MG: 100 INJECTION INTRAVENOUS; INTRAVENTRICULAR at 11:39

## 2024-07-30 RX ADMIN — SODIUM CHLORIDE 1.7 UNITS/HR: 9 INJECTION, SOLUTION INTRAVENOUS at 08:06

## 2024-07-30 RX ADMIN — AMINOCAPROIC ACID 10 G: 250 INJECTION, SOLUTION INTRAVENOUS at 08:33

## 2024-07-30 RX ADMIN — NOREPINEPHRINE BITARTRATE 0.02 MCG/KG/MIN: 1 SOLUTION INTRAVENOUS at 07:00

## 2024-07-30 RX ADMIN — FAMOTIDINE 20 MG: 10 INJECTION INTRAVENOUS at 05:56

## 2024-07-30 RX ADMIN — NOREPINEPHRINE BITARTRATE 8 MCG: 1 SOLUTION INTRAVENOUS at 06:59

## 2024-07-30 RX ADMIN — SODIUM CHLORIDE 2000 MG: 900 INJECTION INTRAVENOUS at 18:14

## 2024-07-30 RX ADMIN — ONDANSETRON 4 MG: 2 INJECTION INTRAMUSCULAR; INTRAVENOUS at 19:42

## 2024-07-30 RX ADMIN — AMINOCAPROIC ACID 10 G: 250 INJECTION, SOLUTION INTRAVENOUS at 11:12

## 2024-07-30 RX ADMIN — ROCURONIUM BROMIDE 20 MG: 10 INJECTION, SOLUTION INTRAVENOUS at 08:04

## 2024-07-30 RX ADMIN — MIDAZOLAM 2 MG: 1 INJECTION INTRAMUSCULAR; INTRAVENOUS at 06:46

## 2024-07-30 RX ADMIN — SODIUM CHLORIDE 30 ML/HR: 9 INJECTION, SOLUTION INTRAVENOUS at 13:19

## 2024-07-30 RX ADMIN — CALCIUM CHLORIDE 250 MG: 100 INJECTION INTRAVENOUS; INTRAVENTRICULAR at 11:44

## 2024-07-30 RX ADMIN — FENTANYL CITRATE 100 MCG: 0.05 INJECTION, SOLUTION INTRAMUSCULAR; INTRAVENOUS at 11:25

## 2024-07-30 RX ADMIN — METOPROLOL TARTRATE 12.5 MG: 25 TABLET, FILM COATED ORAL at 05:35

## 2024-07-30 RX ADMIN — ACETAMINOPHEN 1000 MG: 10 INJECTION INTRAVENOUS at 20:16

## 2024-07-30 RX ADMIN — ALPRAZOLAM 0.25 MG: 0.25 TABLET ORAL at 20:17

## 2024-07-30 NOTE — ANESTHESIA PROCEDURE NOTES
Diagnostic IntraOp Damian    Procedure Performed: Diagnostic IntraOp Damian       Start Time:  7/30/2024 8:22 AM       End Time:   7/30/2024 8:22 AM    Preanesthesia Checklist:  Patient identified, IV assessed, risks and benefits discussed, monitors and equipment assessed, procedure being performed at surgeon's request and anesthesia consent obtained.    General Procedure Information  Diagnostic Indications for Echo:  assessment of ascending aorta, assessment of surgical repair, defect repair evaluation and hemodynamic monitoring  Physician Requesting Echo: Jr Rubén Sanon MD  ICD Code for Medical Necessity:  Athersclerotic Thoracic aorta disease  Location performed:  OR  Intubated  Bite block placed  Heart visualized  Probe Insertion:  Easy  Probe Type:  Multiplane  Modalities:  2D only, color flow mapping, continuous wave Doppler and pulse wave Doppler    Echocardiographic and Doppler Measurements    Ventricles    Right Ventricle:  Cavity size normal.  Hypertrophy not present.  Thrombus not present.  Global function moderately impaired.    Left Ventricle:  Cavity size dilated.  Diastolic dimension 5.8 cm.  Thrombus not present.  Global Function severely impaired.  Ejection Fraction 25%.      Ventricular Regional Function:  5- Basal Inferior:  hypokinetic  10- Mid Inferolateral:  hypokinetic  11- Mid Inferior:  hypokinetic  12- Mid Inferoseptal:  hypokinetic      Valves    Aortic Valve:  Annulus normal.  Stenosis not present.  Regurgitation absent.  Leaflets normal.  Leaflet motions normal.      Mitral Valve:  Annulus dilated.  Stenosis not present.  Vena Contracta Width: 0.55 cm.  Regurgitation moderate.  Leaflets normal.  Leaflet motions restricted.      Tricuspid Valve:  Annulus normal.  Stenosis not present.  Regurgitation mild.    Pulmonic Valve:  Regurgitation mild.        Aorta    Ascending Aorta:  Size normal.  Dissection not present.  Plaque thickness less than 3 mm.  Mobile plaque not present.    Aortic  Arch:  Size normal.  Dissection not present.  Plaque thickness less than 3 mm.  Mobile plaque not present.    Descending Aorta:  Size normal.  Dissection not present.  Plaque thickness less than 3 mm.  Mobile plaque not present.        Atria    Right Atrium:  Size normal.  Spontaneous echo contrast not present.  Thrombus not present.  Tumor not present.  Device present.    Left Atrium:  Size dilated.  Spontaneous echo contrast present.  Thrombus not present.  Tumor not present.  Device not present.    Left atrial appendage normal.      Septa        Ventricular Septum:  Intra-ventricular septum morphology normal.      Diastolic Function Measurements:  Diastolic Dysfunction Grade= II  E=  ms  A=  ms  E/A Ratio=  1.4  DT=  ms  S/D=   IVRT=    Other Findings  Pericardium:  normal  Pleural Effusion:  none  Pulmonary Arteries:  normal  Pulmonary Venous Flow:  blunted (decreased) systolic flow    Anesthesia Information  Performed Personally  Anesthesiologist:  Christal Crawford MD      Echocardiogram Comments:       Post CPB  S/p HEIDI ligation, CABGx4, Mitral valve repair   Trace MR, mean mitral gradient 1mmHg  Other valves unchanged  Improved RV function  Slightly improved LVEF 35-40%  Mobile echodensity measuring 0.7cm originating from anteroseptal wall of the LV, cannot rule out thrombus. Reviewed pre pump echo and this was previously present, although less noticeable pre CPB  No aortic dissection post decannulation

## 2024-07-30 NOTE — OP NOTE
[facility], [place] CARDIAC SURGERY OP NOTE    Preop Diagnosis: Severe multivessel coronary disease.  Ischemic cardiomyopathy with ejection fraction 25%.  Moderate severe mitral incompetence ischemic.  Recent renal calculus.    Postop Diagnosis: Same with left ventricular thrombus.    Chronic Comorbid Conditions relative to CABG include:  Cardiovascular: Coronary Artery Disease, HFrEF, and Hyperlipidemia  Respiratory: None  Endocrine: None   Nephrology: None  Hematology: None   Other: None    Indications: This patient had reduced ejection fraction and was found to have multivessel coronary disease and mitral incompetence.  He had a renal calculus last Friday but this improved over the weekend and surgery was advisable.  Operation was advisable to prolong life and relieve symptoms.The  calculated STS Risk score was discussed with the patient and family. All risks and alternatives were discussed with the patient and family.  Counseling was done regarding abuse of tobacco, alcohol and drugs as needed.  A discussion about advanced directive was done with the patient. They understand and wish to proceed.    Procedure: CABG x 4.  Skeletonized LIMA to LAD.  Vein graft to PDA.  Vein graft to LV branch 1.  T graft BAL graft to LV branch 2.  Mitral valve repair with a 28 mm Medtronic 3D ring.  Closure of left atrial appendage internally.  Fiber tape Arthrex closure of the sternum.  Endoscopic vein harvest with theSaphena system.  Temporary cardiopulmonary bypass.  Antegrade and retrograde blood cardioplegia with warm reperfusion.  Neurologic monitoring.  Transesophageal echo by anesthesia.    Surgeon: Rubén Sanon MD    Assistant: Assistant: Nyasia Casillas CSA was responsible for performing the following activities: Cardiac Surgery First assist, Endoscopic Vein [place] if needed for CABG,  surgical wound closure and their skilled assistance was necessary for the success of this case.      Anesthesia: GET    Findings : There is no height or weight on file to calculate BMI.  Ejection fraction was 25%.  Initially the RV was a little bit hypokinetic.  His cardiac index on induction was 1.2.  The vein was removed from the right leg with the endoscope it was 4 mm and good quality.  The LIMA was 2 mm with excellent blood flow.  The heart was enlarged grade 6.  There is central mitral incompetence from a dilated annulus.  The PDA was 1.5 mm.  Both LV branches were 1.5 mm.  The LAD was 1.5 mm distally.  As we were de-airing anesthesia noticed a small apical LV thrombus.  In retrospect on review it was there preoperatively.  He will need to be on Coumadin postoperatively.  He also had quite a bit of spontaneous contrast in the left atrial appendage that was oversewn.  The postoperative echo showed ejection fraction of 30% with no mitral incompetence.  The RV function was much better.  There was a terminal marginal branch with a 70% lesion but was too small to bypass back in the AV groove.    Operative Procedure: A primary median sternotomy was made while the right greater saphenous vein was harvested with the endoscope.  The internal mammary artery was dissected off the left chest wall with the cautery at low voltage.  Cardiopulmonary bypass was then established for 141 minutes drifting to 34 °C at appropriate flow rates.  The aorta was crossclamped 107 minutes and we gave a liter of antegrade cold blood cardioplegia then 2 L of retrograde cold blood cardioplegia and repeated doses every 10 to 15 minutes to good effect.  The left atrium was opened on the right side and the valve was examined.  It had central incompetence from a dilated annulus.  Left atrial appendage was oversewn in 2 layers with running 3-0 Prolene.  A 28 mm 3D ring was then sewn into place with 2-0 Ethibond sutures.  It was lowered into place and the sutures were fixed with the Cor Knot device.  With pressure testing there is no leak.   The left atrium was closed with running 3-0 Prolene.    2 veins were anastomosed the ascending aorta with 6-0 Prolene and marked with washers.  The first vein was sewn to the PDA with 7-0 Prolene.  The next vein was sewn to the LV branch 1 with 7-0 Prolene.  A piece of mammary was then sewn to the vein and a T graft and sewn to the other LV branch with 7-0 Prolene.  The LIMA was then sewn to the LAD with 7-0 Prolene.    A warm dose of retrograde cardioplegia was given and then with strong suction on the aortic needle vent the cross-clamp was released.  The patient was rewarmed and complete de-airing was performed.  He was on Primacor.  Cardiopulmonary bypass was then weaned and discontinued.  Decannulation was effected and usual devices were placed.  Hemostasis was obtained.  4 chest tubes were inserted and we applied vancomycin enriched platelet rich plasma.  4 chest tubes were inserted.  The sternum was closed with the Arthrex fiber tape system for secure closure.  The fascia, soft tissues and skin were closed as usual.  All the [place] nicely and all the anastomoses were hemostatic.  The postoperative echo showed no mitral incompetence.  The sponge, needle and instrument counts noted to be correct.  Will start Coumadin on postoperative day 2.    Complications: None    Tubes: 4    Epicardial Wires: 3    Blood Loss: Minimal.  All Cell Saver was returned.    Bypass Time: 141 min    Aortic cross-clamp time: 107 min    Specimen: None    Condition: Good      Patient Care Team:  Xavier Walden MD as PCP - General (Internal Medicine)  Peg Pettit MD as Consulting Physician (Cardiology)        Rubén Sanon MD  7/30/2024  12:17 EDT

## 2024-07-30 NOTE — ANESTHESIA PROCEDURE NOTES
Airway  Urgency: elective    Date/Time: 7/30/2024 7:02 AM  Airway not difficult    General Information and Staff    Patient location during procedure: OR  Anesthesiologist: Christal Crawford MD    Indications and Patient Condition  Indications for airway management: airway protection    Preoxygenated: yes  MILS maintained throughout  Mask difficulty assessment: 2 - vent by mask + OA or adjuvant +/- NMBA    Final Airway Details  Final airway type: endotracheal airway      Successful airway: ETT  Cuffed: yes   Successful intubation technique: direct laryngoscopy  Facilitating devices/methods: anterior pressure/BURP  Endotracheal tube insertion site: oral  Blade: Edu  Blade size: 4  ETT size (mm): 8.0  Cormack-Lehane Classification: grade IIb - view of arytenoids or posterior of glottis only  Placement verified by: chest auscultation and capnometry   Measured from: teeth  Number of attempts at approach: 1  Assessment: lips, teeth, and gum same as pre-op and atraumatic intubation

## 2024-07-30 NOTE — ANESTHESIA POSTPROCEDURE EVALUATION
Patient: Sudhir Diaz    Procedure Summary       Date: 07/30/24 Room / Location: [place] [address] / [facility] CARDIOVASCULAR OPERATING ROOM    Anesthesia Start: 0644 Anesthesia Stop: 1235    Procedure: STERNOTOMY, CORONARY ARTERY BYPASS TIMES FOUR  WITH LEFT INTERNAL MAMMARY ARTERY GRAFT AND LEFT SAPHENOUS  ENDOSCOPIC HARVESTED VEIN GRAFT; MITRAL VALVE REPAIR; TRANSESOPHAGEAL ECHOCARDIOGRAM WITH ANESTHESIA, PRP. (Chest) Diagnosis:       Coronary artery disease of native artery of native heart with stable angina pectoris      (Coronary artery disease of native artery of native heart with stable angina pectoris [I25.118])    Surgeons: Jr Rubén Sanon MD Provider: Christal Crawford MD    Anesthesia Type: general ASA Status: 4            Anesthesia Type: general    Vitals  Vitals Value Taken Time   BP 88/65 07/30/24 1615   Temp 36.5 °C (97.7 °F) 07/30/24 1615   Pulse 88 07/30/24 1615   Resp     SpO2 100 % 07/30/24 1615   Vitals shown include unfiled device data.        Post Anesthesia Care and Evaluation    Patient location during evaluation: ICU  Post-procedure mental status: sedated.  Pain management: adequate    Airway patency: patent  Anesthetic complications: No anesthetic complications    Cardiovascular status: acceptable  Respiratory status: ETT and intubated  Hydration status: acceptable    Comments: /69 (BP Location: Right arm)   Pulse 90   Temp 36.4 °C (97.5 °F) (Core)   Resp 18   SpO2 100%

## 2024-07-30 NOTE — ANESTHESIA PROCEDURE NOTES
Arterial Line      Patient reassessed immediately prior to procedure    Start time: 7/30/2024 6:51 AM  Stop Time:7/30/2024 6:55 AM       Line placed for hemodynamic monitoring and ABGs/Labs/ISTAT.  Performed By   Anesthesiologist: Christal Crawford MD   Preanesthetic Checklist  Completed: patient identified, IV checked, site marked, risks and benefits discussed, surgical consent, monitors and equipment checked, pre-op evaluation and timeout performed  Arterial Line Prep    Sterile Tech: gloves, mask and cap  Prep: ChloraPrep  Patient monitoring: blood pressure monitoring, continuous pulse oximetry and EKG  Arterial Line Procedure   Laterality:left  Location:  radial artery  Catheter size: 20 G   Guidance: palpation technique  Number of attempts: 1  Successful placement: yes   Post Assessment   Dressing Type: secured with tape and occlusive dressing applied.   Complications no  Circ/Move/Sens Assessment: normal and unchanged.   Patient Tolerance: patient tolerated the procedure well with no apparent complications

## 2024-07-30 NOTE — INTERVAL H&P NOTE
H&P updated. The patient was examined and the following changes are noted:  no further issues from small renal stone. OR today.

## 2024-07-30 NOTE — ANESTHESIA PROCEDURE NOTES
PA Catheter      Patient reassessed immediately prior to procedure    Patient location during procedure: OR  Start time: 7/30/2024 7:13 AM  Stop Time:7/30/2024 7:17 AM  Indications: central pressure monitoring  Staff  Anesthesiologist: Christal Crawford MD  Preanesthetic Checklist  Completed: patient identified, IV checked, site marked, risks and benefits discussed, surgical consent, monitors and equipment checked, pre-op evaluation and timeout performed  Central Line Prep  Sterile Tech:cap, gloves, gown, mask and sterile barriers  Prep: chloraprep  Patient monitoring: blood pressure monitoring, EKG and continuous pulse oximetry  Central Line Procedure  Laterality:right  Location:internal jugular  Catheter Type:[place]  Assessment  Complications:no  Patient Tolerance:patient tolerated the procedure well with no apparent complications

## 2024-07-30 NOTE — PLAN OF CARE
Goal Outcome Evaluation:              Outcome Evaluation: S/P CABG x4, MV repair.  Extubated to 4L/m NC.  Pacing via epicardial wires.  Low dose levophed.  Hemodynamics stable.  Treated for pain per MAR.

## 2024-07-30 NOTE — ANESTHESIA PROCEDURE NOTES
Central Line      Patient reassessed immediately prior to procedure    Patient location during procedure: OR  Start time: 7/30/2024 7:07 AM  Stop Time:7/30/2024 7:13 AM  Indications: central pressure monitoring, vascular access and MD/Surgeon request  Staff  Anesthesiologist: Christal Crawford MD  Preanesthetic Checklist  Completed: patient identified, IV checked, site marked, risks and benefits discussed, surgical consent, monitors and equipment checked, pre-op evaluation and timeout performed  Central Line Prep  Sterile Tech:cap, gloves, gown, mask and sterile barriers  Prep: chloraprep  Patient monitoring: blood pressure monitoring, EKG and continuous pulse oximetry  Central Line Procedure  Laterality:right  Location:internal jugular  Catheter Type:Cordis (MAC Introducer)  Catheter Size:9 Fr  Guidance:ultrasound guided  PROCEDURE NOTE/ULTRASOUND INTERPRETATION.  Using ultrasound guidance the potential vascular sites for insertion of the catheter were visualized to determine the patency of the vessel to be used for vascular access.  After selecting the appropriate site for insertion, the needle was visualized under ultrasound being inserted into the internal jugular vein, followed by ultrasound confirmation of wire and catheter placement. There were no abnormalities seen on ultrasound; an image was taken; and the patient tolerated the procedure with no complications. Images: still images obtained, printed/placed on chart (image in WADE exam)  Assessment  Post procedure:biopatch applied, line sutured, occlusive dressing applied and secured with tape  Assessement:blood return through all ports, free fluid flow, chest x-ray ordered and Aleksey Test  Complications:no  Patient Tolerance:patient tolerated the procedure well with no apparent complications

## 2024-07-31 ENCOUNTER — APPOINTMENT (OUTPATIENT)
Dept: GENERAL RADIOLOGY | Facility: HOSPITAL | Age: 69
DRG: 220 | End: 2024-07-31
Payer: MEDICARE

## 2024-07-31 LAB
ALBUMIN SERPL-MCNC: 4.4 G/DL (ref 3.5–5.2)
ANION GAP SERPL CALCULATED.3IONS-SCNC: 14.3 MMOL/L (ref 5–15)
BASOPHILS # BLD AUTO: 0.02 10*3/MM3 (ref 0–0.2)
BASOPHILS NFR BLD AUTO: 0.2 % (ref 0–1.5)
BUN SERPL-MCNC: 19 MG/DL (ref 8–23)
BUN/CREAT SERPL: 17.8 (ref 7–25)
CA-I BLD-MCNC: 5.3 MG/DL (ref 4.6–5.4)
CA-I SERPL ISE-MCNC: 1.33 MMOL/L (ref 1.15–1.35)
CALCIUM SPEC-SCNC: 9.2 MG/DL (ref 8.6–10.5)
CHLORIDE SERPL-SCNC: 110 MMOL/L (ref 98–107)
CO2 SERPL-SCNC: 17.7 MMOL/L (ref 22–29)
CREAT SERPL-MCNC: 1.07 MG/DL (ref 0.76–1.27)
DEPRECATED RDW RBC AUTO: 44.5 FL (ref 37–54)
EGFRCR SERPLBLD CKD-EPI 2021: 74.2 ML/MIN/1.73
EOSINOPHIL # BLD AUTO: 0 10*3/MM3 (ref 0–0.4)
EOSINOPHIL NFR BLD AUTO: 0 % (ref 0.3–6.2)
ERYTHROCYTE [DISTWIDTH] IN BLOOD BY AUTOMATED COUNT: 14.7 % (ref 12.3–15.4)
GLUCOSE BLDC GLUCOMTR-MCNC: 129 MG/DL (ref 70–130)
GLUCOSE BLDC GLUCOMTR-MCNC: 144 MG/DL (ref 70–130)
GLUCOSE BLDC GLUCOMTR-MCNC: 153 MG/DL (ref 70–130)
GLUCOSE BLDC GLUCOMTR-MCNC: 156 MG/DL (ref 70–130)
GLUCOSE BLDC GLUCOMTR-MCNC: 164 MG/DL (ref 70–130)
GLUCOSE BLDC GLUCOMTR-MCNC: 218 MG/DL (ref 70–130)
GLUCOSE SERPL-MCNC: 128 MG/DL (ref 65–99)
HCT VFR BLD AUTO: 28.9 % (ref 37.5–51)
HGB BLD-MCNC: 9.4 G/DL (ref 13–17.7)
IMM GRANULOCYTES # BLD AUTO: 0.04 10*3/MM3 (ref 0–0.05)
IMM GRANULOCYTES NFR BLD AUTO: 0.4 % (ref 0–0.5)
INR PPP: 1.25 (ref 0.9–1.1)
LYMPHOCYTES # BLD AUTO: 0.97 10*3/MM3 (ref 0.7–3.1)
LYMPHOCYTES NFR BLD AUTO: 9.8 % (ref 19.6–45.3)
MAGNESIUM SERPL-MCNC: 2.7 MG/DL (ref 1.6–2.4)
MCH RBC QN AUTO: 27.4 PG (ref 26.6–33)
MCHC RBC AUTO-ENTMCNC: 32.5 G/DL (ref 31.5–35.7)
MCV RBC AUTO: 84.3 FL (ref 79–97)
MONOCYTES # BLD AUTO: 0.64 10*3/MM3 (ref 0.1–0.9)
MONOCYTES NFR BLD AUTO: 6.5 % (ref 5–12)
NEUTROPHILS NFR BLD AUTO: 8.18 10*3/MM3 (ref 1.7–7)
NEUTROPHILS NFR BLD AUTO: 83.1 % (ref 42.7–76)
PHOSPHATE SERPL-MCNC: 6.6 MG/DL (ref 2.5–4.5)
PLATELET # BLD AUTO: 146 10*3/MM3 (ref 140–450)
PMV BLD AUTO: 10.3 FL (ref 6–12)
POTASSIUM SERPL-SCNC: 4.1 MMOL/L (ref 3.5–5.2)
PROTHROMBIN TIME: 15.9 SECONDS (ref 11.7–14.2)
QT INTERVAL: 415 MS
QT INTERVAL: 469 MS
QTC INTERVAL: 473 MS
QTC INTERVAL: 476 MS
RBC # BLD AUTO: 3.43 10*6/MM3 (ref 4.14–5.8)
SODIUM SERPL-SCNC: 142 MMOL/L (ref 136–145)
WBC NRBC COR # BLD AUTO: 9.85 10*3/MM3 (ref 3.4–10.8)

## 2024-07-31 PROCEDURE — 71045 X-RAY EXAM CHEST 1 VIEW: CPT

## 2024-07-31 PROCEDURE — 25010000002 METOCLOPRAMIDE PER 10 MG: Performed by: NURSE PRACTITIONER

## 2024-07-31 PROCEDURE — 93010 ELECTROCARDIOGRAM REPORT: CPT | Performed by: INTERNAL MEDICINE

## 2024-07-31 PROCEDURE — 25010000002 ALBUMIN HUMAN 5% PER 50 ML: Performed by: NURSE PRACTITIONER

## 2024-07-31 PROCEDURE — 25010000002 CEFAZOLIN PER 500 MG: Performed by: NURSE PRACTITIONER

## 2024-07-31 PROCEDURE — 82948 REAGENT STRIP/BLOOD GLUCOSE: CPT

## 2024-07-31 PROCEDURE — 97110 THERAPEUTIC EXERCISES: CPT

## 2024-07-31 PROCEDURE — P9041 ALBUMIN (HUMAN),5%, 50ML: HCPCS | Performed by: NURSE PRACTITIONER

## 2024-07-31 PROCEDURE — 82330 ASSAY OF CALCIUM: CPT | Performed by: NURSE PRACTITIONER

## 2024-07-31 PROCEDURE — 80069 RENAL FUNCTION PANEL: CPT | Performed by: NURSE PRACTITIONER

## 2024-07-31 PROCEDURE — 25010000002 ACETAMINOPHEN 10 MG/ML SOLUTION: Performed by: NURSE PRACTITIONER

## 2024-07-31 PROCEDURE — 94761 N-INVAS EAR/PLS OXIMETRY MLT: CPT

## 2024-07-31 PROCEDURE — 25010000002 ENOXAPARIN PER 10 MG: Performed by: NURSE PRACTITIONER

## 2024-07-31 PROCEDURE — 25010000002 CALCIUM GLUCONATE 2-0.675 GM/100ML-% SOLUTION: Performed by: NURSE PRACTITIONER

## 2024-07-31 PROCEDURE — 25010000002 MILRINONE LACTATE IN DEXTROSE 20-5 MG/100ML-% SOLUTION: Performed by: NURSE PRACTITIONER

## 2024-07-31 PROCEDURE — 85610 PROTHROMBIN TIME: CPT | Performed by: NURSE PRACTITIONER

## 2024-07-31 PROCEDURE — 25010000002 FUROSEMIDE PER 20 MG: Performed by: NURSE PRACTITIONER

## 2024-07-31 PROCEDURE — 25010000002 MAGNESIUM SULFATE IN D5W 1G/100ML (PREMIX) 1-5 GM/100ML-% SOLUTION: Performed by: NURSE PRACTITIONER

## 2024-07-31 PROCEDURE — 93005 ELECTROCARDIOGRAM TRACING: CPT | Performed by: NURSE PRACTITIONER

## 2024-07-31 PROCEDURE — 85025 COMPLETE CBC W/AUTO DIFF WBC: CPT | Performed by: NURSE PRACTITIONER

## 2024-07-31 PROCEDURE — 94799 UNLISTED PULMONARY SVC/PX: CPT

## 2024-07-31 PROCEDURE — 25010000002 ONDANSETRON PER 1 MG: Performed by: NURSE PRACTITIONER

## 2024-07-31 PROCEDURE — 97162 PT EVAL MOD COMPLEX 30 MIN: CPT

## 2024-07-31 PROCEDURE — 63710000001 INSULIN LISPRO (HUMAN) PER 5 UNITS: Performed by: NURSE PRACTITIONER

## 2024-07-31 PROCEDURE — 83735 ASSAY OF MAGNESIUM: CPT | Performed by: NURSE PRACTITIONER

## 2024-07-31 RX ORDER — CALCIUM GLUCONATE 20 MG/ML
2000 INJECTION, SOLUTION INTRAVENOUS ONCE
Status: DISCONTINUED | OUTPATIENT
Start: 2024-07-31 | End: 2024-07-31

## 2024-07-31 RX ORDER — GABAPENTIN 100 MG/1
100 CAPSULE ORAL EVERY 12 HOURS SCHEDULED
Status: DISCONTINUED | OUTPATIENT
Start: 2024-07-31 | End: 2024-08-02

## 2024-07-31 RX ORDER — IBUPROFEN 600 MG/1
1 TABLET ORAL
Status: DISCONTINUED | OUTPATIENT
Start: 2024-07-31 | End: 2024-08-04 | Stop reason: HOSPADM

## 2024-07-31 RX ORDER — SODIUM BICARBONATE 650 MG/1
650 TABLET ORAL 2 TIMES DAILY
Status: COMPLETED | OUTPATIENT
Start: 2024-07-31 | End: 2024-07-31

## 2024-07-31 RX ORDER — ESCITALOPRAM OXALATE 5 MG/1
5 TABLET ORAL DAILY
Status: DISCONTINUED | OUTPATIENT
Start: 2024-07-31 | End: 2024-08-04 | Stop reason: HOSPADM

## 2024-07-31 RX ORDER — POTASSIUM CHLORIDE 750 MG/1
20 TABLET, FILM COATED, EXTENDED RELEASE ORAL ONCE
Status: COMPLETED | OUTPATIENT
Start: 2024-07-31 | End: 2024-07-31

## 2024-07-31 RX ORDER — WARFARIN SODIUM 2 MG/1
2 TABLET ORAL
Status: DISCONTINUED | OUTPATIENT
Start: 2024-08-01 | End: 2024-08-03

## 2024-07-31 RX ORDER — FUROSEMIDE 10 MG/ML
40 INJECTION INTRAMUSCULAR; INTRAVENOUS ONCE
Status: COMPLETED | OUTPATIENT
Start: 2024-07-31 | End: 2024-07-31

## 2024-07-31 RX ORDER — INSULIN LISPRO 100 [IU]/ML
2-9 INJECTION, SOLUTION INTRAVENOUS; SUBCUTANEOUS
Status: DISCONTINUED | OUTPATIENT
Start: 2024-07-31 | End: 2024-08-04

## 2024-07-31 RX ORDER — TAMSULOSIN HYDROCHLORIDE 0.4 MG/1
0.4 CAPSULE ORAL DAILY
Status: DISCONTINUED | OUTPATIENT
Start: 2024-08-01 | End: 2024-08-02

## 2024-07-31 RX ORDER — CALCIUM GLUCONATE 20 MG/ML
2000 INJECTION, SOLUTION INTRAVENOUS ONCE
Status: COMPLETED | OUTPATIENT
Start: 2024-07-31 | End: 2024-07-31

## 2024-07-31 RX ORDER — MILRINONE LACTATE 0.2 MG/ML
0.12 INJECTION, SOLUTION INTRAVENOUS CONTINUOUS
Status: DISCONTINUED | OUTPATIENT
Start: 2024-07-31 | End: 2024-08-01

## 2024-07-31 RX ORDER — GUAIFENESIN 600 MG/1
1200 TABLET, EXTENDED RELEASE ORAL EVERY 12 HOURS SCHEDULED
Status: DISCONTINUED | OUTPATIENT
Start: 2024-07-31 | End: 2024-08-04 | Stop reason: HOSPADM

## 2024-07-31 RX ORDER — DEXTROSE MONOHYDRATE 25 G/50ML
25 INJECTION, SOLUTION INTRAVENOUS
Status: DISCONTINUED | OUTPATIENT
Start: 2024-07-31 | End: 2024-08-04 | Stop reason: HOSPADM

## 2024-07-31 RX ORDER — NICOTINE POLACRILEX 4 MG
15 LOZENGE BUCCAL
Status: DISCONTINUED | OUTPATIENT
Start: 2024-07-31 | End: 2024-08-04 | Stop reason: HOSPADM

## 2024-07-31 RX ADMIN — MILRINONE LACTATE 0.12 MCG/KG/MIN: 0.2 INJECTION, SOLUTION INTRAVENOUS at 10:25

## 2024-07-31 RX ADMIN — OXYCODONE HYDROCHLORIDE 10 MG: 5 TABLET ORAL at 15:22

## 2024-07-31 RX ADMIN — ATORVASTATIN CALCIUM 40 MG: 20 TABLET, FILM COATED ORAL at 20:55

## 2024-07-31 RX ADMIN — OXYCODONE HYDROCHLORIDE 10 MG: 5 TABLET ORAL at 06:04

## 2024-07-31 RX ADMIN — SODIUM CHLORIDE 2000 MG: 900 INJECTION INTRAVENOUS at 21:00

## 2024-07-31 RX ADMIN — 0.12% CHLORHEXIDINE GLUCONATE 15 ML: 1.2 RINSE ORAL at 20:55

## 2024-07-31 RX ADMIN — MILRINONE LACTATE 0.12 MCG/KG/MIN: 0.2 INJECTION, SOLUTION INTRAVENOUS at 03:33

## 2024-07-31 RX ADMIN — METOPROLOL TARTRATE 25 MG: 25 TABLET, FILM COATED ORAL at 09:57

## 2024-07-31 RX ADMIN — METOCLOPRAMIDE 10 MG: 5 INJECTION, SOLUTION INTRAMUSCULAR; INTRAVENOUS at 06:15

## 2024-07-31 RX ADMIN — SODIUM BICARBONATE 650 MG: 650 TABLET ORAL at 21:59

## 2024-07-31 RX ADMIN — ALBUMIN (HUMAN) 250 ML: 12.5 INJECTION, SOLUTION INTRAVENOUS at 00:01

## 2024-07-31 RX ADMIN — SODIUM BICARBONATE 650 MG: 650 TABLET ORAL at 09:57

## 2024-07-31 RX ADMIN — ESCITALOPRAM 5 MG: 5 TABLET, FILM COATED ORAL at 09:57

## 2024-07-31 RX ADMIN — OXYCODONE HYDROCHLORIDE 10 MG: 5 TABLET ORAL at 09:56

## 2024-07-31 RX ADMIN — ACETAMINOPHEN 1000 MG: 10 INJECTION INTRAVENOUS at 05:06

## 2024-07-31 RX ADMIN — GUAIFENESIN 1200 MG: 600 TABLET, EXTENDED RELEASE ORAL at 20:55

## 2024-07-31 RX ADMIN — GABAPENTIN 100 MG: 100 CAPSULE ORAL at 08:50

## 2024-07-31 RX ADMIN — METOCLOPRAMIDE 10 MG: 5 INJECTION, SOLUTION INTRAMUSCULAR; INTRAVENOUS at 02:00

## 2024-07-31 RX ADMIN — PANTOPRAZOLE SODIUM 40 MG: 40 TABLET, DELAYED RELEASE ORAL at 06:04

## 2024-07-31 RX ADMIN — METOPROLOL TARTRATE 25 MG: 25 TABLET, FILM COATED ORAL at 20:55

## 2024-07-31 RX ADMIN — CALCIUM GLUCONATE 2000 MG: 20 INJECTION, SOLUTION INTRAVENOUS at 08:50

## 2024-07-31 RX ADMIN — 0.12% CHLORHEXIDINE GLUCONATE 15 ML: 1.2 RINSE ORAL at 08:49

## 2024-07-31 RX ADMIN — HYDROCODONE BITARTRATE AND ACETAMINOPHEN 2 TABLET: 5; 325 TABLET ORAL at 20:55

## 2024-07-31 RX ADMIN — MUPIROCIN 1 APPLICATION: 20 OINTMENT TOPICAL at 08:53

## 2024-07-31 RX ADMIN — GUAIFENESIN 1200 MG: 600 TABLET, EXTENDED RELEASE ORAL at 08:49

## 2024-07-31 RX ADMIN — SENNOSIDES AND DOCUSATE SODIUM 2 TABLET: 50; 8.6 TABLET ORAL at 20:55

## 2024-07-31 RX ADMIN — INSULIN LISPRO 4 UNITS: 100 INJECTION, SOLUTION INTRAVENOUS; SUBCUTANEOUS at 20:55

## 2024-07-31 RX ADMIN — ASPIRIN 81 MG: 81 TABLET, COATED ORAL at 08:49

## 2024-07-31 RX ADMIN — INSULIN LISPRO 2 UNITS: 100 INJECTION, SOLUTION INTRAVENOUS; SUBCUTANEOUS at 11:58

## 2024-07-31 RX ADMIN — FUROSEMIDE 40 MG: 10 INJECTION, SOLUTION INTRAMUSCULAR; INTRAVENOUS at 08:49

## 2024-07-31 RX ADMIN — OXYCODONE HYDROCHLORIDE 10 MG: 5 TABLET ORAL at 00:00

## 2024-07-31 RX ADMIN — SODIUM CHLORIDE 2000 MG: 900 INJECTION INTRAVENOUS at 02:57

## 2024-07-31 RX ADMIN — ENOXAPARIN SODIUM 40 MG: 100 INJECTION SUBCUTANEOUS at 16:13

## 2024-07-31 RX ADMIN — MAGNESIUM SULFATE IN DEXTROSE 1 G: 10 INJECTION, SOLUTION INTRAVENOUS at 05:06

## 2024-07-31 RX ADMIN — POTASSIUM CHLORIDE 20 MEQ: 750 TABLET, EXTENDED RELEASE ORAL at 08:50

## 2024-07-31 RX ADMIN — MUPIROCIN: 20 OINTMENT TOPICAL at 20:56

## 2024-07-31 RX ADMIN — INSULIN LISPRO 2 UNITS: 100 INJECTION, SOLUTION INTRAVENOUS; SUBCUTANEOUS at 16:13

## 2024-07-31 RX ADMIN — GABAPENTIN 100 MG: 100 CAPSULE ORAL at 21:00

## 2024-07-31 RX ADMIN — ONDANSETRON 4 MG: 2 INJECTION INTRAMUSCULAR; INTRAVENOUS at 06:11

## 2024-07-31 RX ADMIN — SODIUM CHLORIDE 2000 MG: 900 INJECTION INTRAVENOUS at 11:58

## 2024-07-31 NOTE — PROGRESS NOTES
LOS: 1 day   Patient Care Team:  Xavier Walden MD as PCP - General (Internal Medicine)  Peg Pettit MD as Consulting Physician (Cardiology)    Chief Complaint: post op     Subjective      Vital Signs  Temp:  [96.6 °F (35.9 °C)-99.1 °F (37.3 °C)] 98.6 °F (37 °C)  Heart Rate:  [78-91] 80  Resp:  [15-19] 18  BP: ()/(48-78) 107/53  Arterial Line BP: ()/(37-79) 129/47  FiO2 (%):  [40 %-100 %] 40 %  Body mass index is 27.86 kg/m².    Intake/Output Summary (Last 24 hours) at 7/31/2024 0755  Last data filed at 7/31/2024 0600  Gross per 24 hour   Intake 5301.3 ml   Output 4360 ml   Net 941.3 ml     No intake/output data recorded.    Chest tube drainage last 8 hnbah648/0        07/31/24  0600   Weight: 80.7 kg (177 lb 14.6 oz)         Objective:  Vital signs: (most recent): Blood pressure 107/53, pulse 83, temperature 98.6 °F (37 °C), resp. rate 18, weight 80.7 kg (177 lb 14.6 oz), SpO2 100%.                Physical Exam:   General Appearance: awake and alert, no acute distress   Lungs: clear to auscultation, respirations regular, respirations even, and respirations unlabored   Heart: regular rhythm & normal rate, normal S1, S2, no murmur, no gallop, no rub, and no click   Abdomen: soft, hypoactive bowel sounds    Skin: sternal incision clean, dry, intact   Neuro: alert and oriented, no focal deficits.     Results Review:        WBC WBC   Date Value Ref Range Status   07/31/2024 9.85 3.40 - 10.80 10*3/mm3 Final   07/30/2024 14.65 (H) 3.40 - 10.80 10*3/mm3 Final   07/30/2024 13.17 (H) 3.40 - 10.80 10*3/mm3 Final   07/30/2024 6.69 3.40 - 10.80 10*3/mm3 Final      HGB Hemoglobin   Date Value Ref Range Status   07/31/2024 9.4 (L) 13.0 - 17.7 g/dL Final   07/30/2024 10.4 (L) 13.0 - 17.7 g/dL Final   07/30/2024 11.7 (L) 13.0 - 17.7 g/dL Final   07/30/2024 8.5 (L) 13.0 - 17.7 g/dL Final   07/30/2024 9.9 (L) 12.0 - 17.0 g/dL Final   07/30/2024 10.2 (L) 12.0 - 17.0 g/dL Final   07/30/2024 9.9 (L) 12.0 - 17.0  g/dL Final   07/30/2024 9.5 (L) 12.0 - 17.0 g/dL Final   07/30/2024 9.5 (L) 12.0 - 17.0 g/dL Final   07/30/2024 11.9 (L) 12.0 - 17.0 g/dL Final      HCT Hematocrit   Date Value Ref Range Status   07/31/2024 28.9 (L) 37.5 - 51.0 % Final   07/30/2024 31.1 (L) 37.5 - 51.0 % Final   07/30/2024 35.4 (L) 37.5 - 51.0 % Final   07/30/2024 25.9 (L) 37.5 - 51.0 % Final   07/30/2024 29 (L) 38 - 51 % Final   07/30/2024 30 (L) 38 - 51 % Final   07/30/2024 29 (L) 38 - 51 % Final   07/30/2024 28 (L) 38 - 51 % Final   07/30/2024 28 (L) 38 - 51 % Final   07/30/2024 35 (L) 38 - 51 % Final      Platelets Platelets   Date Value Ref Range Status   07/31/2024 146 140 - 450 10*3/mm3 Final   07/30/2024 160 140 - 450 10*3/mm3 Final   07/30/2024 146 140 - 450 10*3/mm3 Final   07/30/2024 130 (L) 140 - 450 10*3/mm3 Final        PT/INR:    Protime   Date Value Ref Range Status   07/31/2024 15.9 (H) 11.7 - 14.2 Seconds Final   07/30/2024 16.6 (H) 11.7 - 14.2 Seconds Final   07/30/2024 28.1 (H) 11.7 - 14.2 Seconds Final   /  INR   Date Value Ref Range Status   07/31/2024 1.25 (H) 0.90 - 1.10 Final   07/30/2024 1.32 (H) 0.90 - 1.10 Final   07/30/2024 2.60 (H) 0.90 - 1.10 Final       Sodium Sodium   Date Value Ref Range Status   07/31/2024 142 136 - 145 mmol/L Final   07/30/2024 143 136 - 145 mmol/L Final   07/30/2024 142 136 - 145 mmol/L Final      Potassium Potassium   Date Value Ref Range Status   07/31/2024 4.1 3.5 - 5.2 mmol/L Final   07/30/2024 4.2 3.5 - 5.2 mmol/L Final   07/30/2024 3.9 3.5 - 5.2 mmol/L Final     Comment:     Slight hemolysis detected by analyzer. Result may be falsely elevated.      Chloride Chloride   Date Value Ref Range Status   07/31/2024 110 (H) 98 - 107 mmol/L Final   07/30/2024 113 (H) 98 - 107 mmol/L Final   07/30/2024 112 (H) 98 - 107 mmol/L Final      Bicarbonate CO2   Date Value Ref Range Status   07/31/2024 17.7 (L) 22.0 - 29.0 mmol/L Final   07/30/2024 19.6 (L) 22.0 - 29.0 mmol/L Final   07/30/2024 21.9 (L)  22.0 - 29.0 mmol/L Final      BUN BUN   Date Value Ref Range Status   07/31/2024 19 8 - 23 mg/dL Final   07/30/2024 17 8 - 23 mg/dL Final   07/30/2024 14 8 - 23 mg/dL Final      Creatinine Creatinine   Date Value Ref Range Status   07/31/2024 1.07 0.76 - 1.27 mg/dL Final   07/30/2024 1.06 0.76 - 1.27 mg/dL Final   07/30/2024 1.07 0.76 - 1.27 mg/dL Final      Calcium Calcium   Date Value Ref Range Status   07/31/2024 9.2 8.6 - 10.5 mg/dL Final   07/30/2024 8.7 8.6 - 10.5 mg/dL Final   07/30/2024 9.7 8.6 - 10.5 mg/dL Final      Magnesium Magnesium   Date Value Ref Range Status   07/31/2024 2.7 (H) 1.6 - 2.4 mg/dL Final   07/30/2024 2.8 (H) 1.6 - 2.4 mg/dL Final   07/30/2024 3.1 (H) 1.6 - 2.4 mg/dL Final          aspirin, 81 mg, Oral, Daily  atorvastatin, 40 mg, Oral, Nightly  ceFAZolin, 2,000 mg, Intravenous, Q8H  chlorhexidine, 15 mL, Mouth/Throat, Q12H  enoxaparin, 40 mg, Subcutaneous, Daily  guaiFENesin, 1,200 mg, Oral, Q12H  insulin lispro, 2-9 Units, Subcutaneous, 4x Daily AC & at Bedtime  magnesium sulfate, 1 g, Intravenous, Q8H  metoprolol tartrate, 12.5 mg, Oral, Q12H  mupirocin, , Each Nare, BID  pantoprazole, 40 mg, Intravenous, Once   Followed by  pantoprazole, 40 mg, Oral, QAM  senna-docusate sodium, 2 tablet, Oral, Nightly  [START ON 8/1/2024] tamsulosin, 0.4 mg, Oral, Daily      clevidipine, 2-32 mg/hr  DOPamine, 2-20 mcg/kg/min  EPINEPHrine, 0.02-0.1 mcg/kg/min  milrinone, 0.25-0.375 mcg/kg/min, Last Rate: 0.125 mcg/kg/min (07/31/24 0333)  niCARdipine, 5-15 mg/hr  norepinephrine, 0.02-0.2 mcg/kg/min, Last Rate: Stopped (07/31/24 0100)  phenylephrine, 0.2-2 mcg/kg/min  propofol, 5-50 mcg/kg/min, Last Rate: Stopped (07/30/24 1445)  sodium chloride, 30 mL/hr, Last Rate: 30 mL/hr (07/30/24 1319)              Coronary artery disease of native artery of native heart with stable angina pectoris    CAD (coronary artery disease), native coronary artery      Assessment & Plan    - coronary artery disease-  s/pCABGx4 MVr, HEIDI- [place]  - mitral regurgitation  - HTN  - HLD  - cardiomyopathy, EF 26-35% on TTE  -LV thrombus  -post op anemia- expected acute blood loss     POD#1  Looks good this morning.  Up in the chair  2L NC-- wean as able  Sinus rhythm rate in the 80s-- will tolerate beta blocker. IV diurese  Encourage pulmonary toilet   Mobilize/increase activity  Coumadin to be started for LV thrombus, tentatively tomorrow.   Transfer to stepdown    DONITA Cruz  07/31/24  07:55 EDT

## 2024-07-31 NOTE — DISCHARGE PLACEMENT REQUEST
"Clif Pérez (69 y.o. Male)       Date of Birth   1955    Social Security Number       Address   [address] [place] [address]    Home Phone   [phone]    MRN   [id number]       Sabianism   None    Marital Status   Single                            Admission Date   7/30/24    Admission Type   Elective    Admitting Provider   Jr Rubén Sanon MD    Attending Provider   Jr Rubén Sanon MD    Department, Room/Bed   [facility] CARDIOVASC UNIT, 2201/1       Discharge Date       Discharge Disposition       Discharge Destination                                 Attending Provider: Jr Rubén Sanon MD    Allergies: No Known Allergies    Isolation: None   Infection: None   Code Status: CPR    Ht: 170.2 cm (67\")   Wt: 80.7 kg (177 lb 14.6 oz)    Admission Cmt: None   Principal Problem: Coronary artery disease of native artery of native heart with stable angina pectoris [I25.118]                   Active Insurance as of 7/30/2024       Primary Coverage       Payor Plan Insurance Group Employer/Plan Group    MEDICARE RAILROAD MEDICARE PART A ONLY        Payor Plan Address Payor Plan Phone Number Payor Plan Fax Number Effective Dates    [address]   1/1/2018 - None Entered    [facility] [id number]         Subscriber Name Subscriber Birth Date Member ID       CLIF PÉREZ 1955 [id number]               Secondary Coverage       Payor Plan Insurance Group Employer/Plan Group    [place] [id number]       Payor Plan Address Payor Plan Phone Number Payor Plan Fax Number Effective Dates    [address]   9/21/2014 - None Entered    [facility] [id number]         Subscriber Name Subscriber Birth Date Member ID       CILF PÉREZ 1955 [id number]                     Emergency Contacts        (Rel.) Home Phone Work Phone Mobile Phone    CHIVO SANABRIA (Daughter) [phone] -- [phone]                "

## 2024-07-31 NOTE — PLAN OF CARE
Goal Outcome Evaluation:              Outcome Evaluation: S/p CABG x4 and MVR on 7/30. Pt reports normally independent, no use of AD, lives alone, spouse passed away in February, 19 yo dtr planning to stay w/ pt.  Pt able to ambulate 75' w/ contact assist using RW, slow guarded gait but no safety concerns. Anticpate DC to home with assist as needed.      Anticipated Discharge Disposition (PT): home with assist

## 2024-07-31 NOTE — CASE MANAGEMENT/SOCIAL WORK
Discharge Planning Assessment  [facility]     Patient Name: Sudhir Diaz  MRN: [id number]  Today's Date: 7/31/2024    Admit Date: 7/30/2024    Plan: Home w/ [facility]   Discharge Needs Assessment       Row Name 07/31/24 1612       Living Environment    People in Home alone    Unique Family Situation Three adult daughters live nearby    Current Living Arrangements home    Potentially Unsafe Housing Conditions none    In the past 12 months has the electric, gas, oil, or water company threatened to shut off services in your home? No    Primary Care Provided by self    Provides Primary Care For pet(s)  outside pets- 2 dogs & 1 cat    Family Caregiver if Needed child(lisa), adult    Family Caregiver Names Daughters, Cassandra, , No and Chantal    Quality of Family Relationships helpful;involved;supportive    Able to Return to Prior Arrangements yes       Resource/Environmental Concerns    Resource/Environmental Concerns none       Transportation Needs    In the past 12 months, has lack of transportation kept you from medical appointments or from getting medications? no    In the past 12 months, has lack of transportation kept you from meetings, work, or from getting things needed for daily living? No       Food Insecurity    Within the past 12 months, you worried that your food would run out before you got the money to buy more. Never true    Within the past 12 months, the food you bought just didn't last and you didn't have money to get more. Never true       Transition Planning    Patient/Family Anticipates Transition to home with family    Patient/Family Anticipated Services at Transition home health care    Transportation Anticipated family or friend will provide       Discharge Needs Assessment    Readmission Within the Last 30 Days no previous admission in last 30 days    Equipment Currently Used at Home bp cuff;scales    Concerns to be Addressed discharge planning    Anticipated Changes  Related to Illness none    Equipment Needed After Discharge none    Patient's Choice of Community Agency(s) Pt agreed to using [facility] at d/c.                   Discharge Plan       Row Name 07/31/24 1630       Plan    Plan Home w/ [facility]    Plan Comments CCP spoke with patient and two of his daughters/iKra, at bedside.  CCP role explained and discharge planning discussed.  Information on face sheet verified.  Patient stated he is IADL's, retired and drives.  Patient lives alone in a three-level home with his bedroom being upstairs.  Pt has one entrance stair step.  Patients PCP confirmed as, Xavier Walden.  Patient's pharmacy confirmed as, [facility].  Patient has the following DME- BP cuff monitor and a scale.  Pt has a shower chair in the garage if needed.  Patient denies use of past home health or going to a sub-acute rehab.  Patient plans to return home at discharge with assistance of daughters.  Patient agreed to using [facility] at discharge.  Referral for HH sent to [facility] and she accepted.  CCP will continue to follow….…Peg WORLEY /EVAN.                  Continued Care and Services - Admitted Since 7/30/2024       Home Medical Care Coordination complete.      Service Provider Request Status Selected Services Address Phone Fax Patient Preferred    [facility]  Selected Home Health Services [address] [facility] [address] [phone] --    [facility] Pending - Request Sent N/A [address] [address] [phone] --                  Expected Discharge Date and Time       Expected Discharge Date Expected Discharge Time    Aug 6, 2024            Demographic Summary       Row Name 07/31/24 1612       General Information    Admission Type inpatient    Arrived From home    Required Notices Provided Important Message from Medicare    Referral Source admission list;physician     Reason for Consult discharge planning    Preferred Language English       Contact Information    Permission Granted to Share Info With family/designee                   Functional Status       Row Name 07/31/24 1612       Functional Status    Usual Activity Tolerance good    Current Activity Tolerance moderate       Assessment of Health Literacy    How often do you have someone help you read hospital materials? Never    Health Literacy Good       Functional Status, IADL    Medications independent    Meal Preparation independent    Housekeeping independent    Laundry independent    Shopping independent       Mental Status    General Appearance WDL WDL       Mental Status Summary    Recent Changes in Mental Status/Cognitive Functioning no changes       Employment/    Employment Status retired                   Psychosocial    No documentation.                  Abuse/Neglect    No documentation.                  Legal    No documentation.                  Substance Abuse    No documentation.                  Patient Forms    No documentation.                     Peg Amor RN

## 2024-07-31 NOTE — THERAPY EVALUATION
Patient Name: Sudhir Diaz  : 1955    MRN: [id number]                              Today's Date: 2024       Admit Date: 2024    Visit Dx:     ICD-10-CM ICD-9-CM   1. S/P CABG (coronary artery bypass graft)  Z95.1 V45.81   2. Coronary artery disease of native artery of native heart with stable angina pectoris  I25.118 414.01     413.9     Patient Active Problem List   Diagnosis    Shortness of breath    Essential hypertension    Chest discomfort    LBBB (left bundle branch block)    Abnormal nuclear stress test    Coronary artery disease of native artery of native heart with stable angina pectoris    Ischemic cardiomyopathy    Mitral incompetence    CAD (coronary artery disease), native coronary artery     Past Medical History:   Diagnosis Date    Anxiety     Arthritis     At risk for sleep apnea     5    Back pain     started 2024 in lower back happens sporadically  no noticable blood in urine  no c/o nausea/ vomiting    Cardiomyopathy     Coronary artery disease     Heart valve disease     mitral    Hyperlipidemia     Hypertension      Past Surgical History:   Procedure Laterality Date    CARDIAC CATHETERIZATION N/A 2024    Procedure: Left and Right Heart Cath with Coronary Angiography;  Surgeon: Peg Pettit MD;  [facility];  Service: Cardiovascular;  Laterality: N/A;    CARPAL TUNNEL RELEASE Bilateral     CHOLECYSTECTOMY      COLONOSCOPY      CORONARY ARTERY BYPASS GRAFT WITH MITRAL VALVE REPAIR/REPLACEMENT N/A 2024    Procedure: STERNOTOMY, CORONARY ARTERY BYPASS TIMES FOUR  WITH LEFT INTERNAL MAMMARY ARTERY GRAFT AND LEFT SAPHENOUS  ENDOSCOPIC HARVESTED VEIN GRAFT; MITRAL VALVE REPAIR; TRANSESOPHAGEAL ECHOCARDIOGRAM WITH ANESTHESIA, PRP.;  Surgeon: Jr Rubén Sanon MD;  Location: [facility];  Service: Cardiothoracic;  Laterality: N/A;    SHOULDER SURGERY Right       General Information       Row Name 24 1222          Physical  Therapy Time and Intention    Document Type evaluation  -AR     Mode of Treatment physical therapy  -AR       Row Name 07/31/24 1222          General Information    Patient Profile Reviewed yes  -AR     Prior Level of Function independent:  no AD, no falls, spouse passed away in February '24  -AR     Existing Precautions/Restrictions fall;cardiac  -AR     Barriers to Rehab none identified  -AR       Row Name 07/31/24 1222          Living Environment    People in Home alone  -AR       Row Name 07/31/24 1222          Home Main Entrance    Number of Stairs, Main Entrance two  -AR       Row Name 07/31/24 1222          Cognition    Orientation Status (Cognition) oriented x 3  -AR       Row Name 07/31/24 1222          Safety Issues, Functional Mobility    Impairments Affecting Function (Mobility) balance;cognition;endurance/activity tolerance;strength;shortness of breath;pain  -AR               User Key  (r) = Recorded By, (t) = Taken By, (c) = Cosigned By      Initials Name Provider Type    AR Sarah Knight PT Physical Therapist                   Mobility       Row Name 07/31/24 1222          Bed Mobility    Bed Mobility supine-sit;sit-supine  -AR     Supine-Sit [place] (Bed Mobility) not tested  -AR     Sit-Supine [place] (Bed Mobility) not tested  -AR       Row Name 07/31/24 1222          Sit-Stand Transfer    Sit-Stand [place] (Transfers) contact guard  -AR     Assistive Device (Sit-Stand Transfers) walker, front-wheeled  -AR       Row Name 07/31/24 1222          Gait/Stairs (Locomotion)    [place] Level (Gait) contact guard  -AR     Assistive Device (Gait) walker, front-wheeled  -AR     Distance in Feet (Gait) 75  -AR     Deviations/Abnormal Patterns (Gait) festinating/shuffling;buddy decreased;antalgic  -AR     Bilateral Gait Deviations forward flexed posture;heel strike decreased  -AR               User Key  (r) = Recorded By, (t) = Taken By, (c) = Cosigned By      Initials Name Provider  Type    AR Sarah Knight, PT Physical Therapist                   Obj/Interventions       Row Name 07/31/24 1223          Range of Motion Comprehensive    Comment, General Range of Motion B LE WFL  -AR       Row Name 07/31/24 1223          Strength Comprehensive (MMT)    Comment, General Manual Muscle Testing (MMT) Assessment B LE 4/5  -AR       Row Name 07/31/24 1223          Balance    Balance Assessment standing dynamic balance  -AR     Dynamic Standing Balance contact guard  -AR     Position/Device Used, Standing Balance walker, rolling  -AR               User Key  (r) = Recorded By, (t) = Taken By, (c) = Cosigned By      Initials Name Provider Type    AR Sarah Knight, PT Physical Therapist                   Goals/Plan       Row Name 07/31/24 1225          Problem Specific Goal 1 (PT)    Problem Specific Goal 1 (PT) cardiac level III  -AR     Time Frame (Problem Specific Goal 1, PT) 1 week  -AR       Row Name 07/31/24 1225          Therapy Assessment/Plan (PT)    Planned Therapy Interventions (PT) balance training;bed mobility training;gait training;home exercise program;patient/family education;strengthening;transfer training  -AR               User Key  (r) = Recorded By, (t) = Taken By, (c) = Cosigned By      Initials Name Provider Type    AR Sarah Knight, PT Physical Therapist                   Clinical Impression       Row Name 07/31/24 1223          Pain    Pretreatment Pain Rating 4/10  -AR     Posttreatment Pain Rating 4/10  -AR     Pain Location incisional  -AR     Pain Location - chest  -AR     Pain Intervention(s) Medication (See MAR);Repositioned  -AR       Row Name 07/31/24 1223          Plan of Care Review    Outcome Evaluation S/p CABG x4 and MVR on 7/30. Pt reports normally independent, no use of AD, lives alone, spouse passed away in February, 20 yo dtr planning to stay w/ pt.  Pt able to ambulate 75' w/ contact assist using RW, slow guarded gait but no safety concerns. Anticpate DC  to home with assist as needed.  -AR       Row Name 07/31/24 1223          Therapy Assessment/Plan (PT)    Rehab Potential (PT) good, to achieve stated therapy goals  -AR     Criteria for Skilled Interventions Met (PT) yes  -AR     Therapy Frequency (PT) 6 times/wk  -AR       Row Name 07/31/24 1223          Vital Signs    O2 Delivery Pre Treatment supplemental O2  -AR     O2 Delivery Post Treatment supplemental O2  -AR       Row Name 07/31/24 1223          Positioning and Restraints    Pre-Treatment Position sitting in chair/recliner  -AR     Post Treatment Position chair  -AR     In Chair notified nsg;reclined;sitting;call light within reach;encouraged to call for assist;with nsg  -AR               User Key  (r) = Recorded By, (t) = Taken By, (c) = Cosigned By      Initials Name Provider Type    Sarah Ivey, PT Physical Therapist                   Outcome Measures       Row Name 07/31/24 1236          How much help from another person do you currently need...    Turning from your back to your side while in flat bed without using bedrails? 3  -AR     Moving from lying on back to sitting on the side of a flat bed without bedrails? 2  -AR     Moving to and from a bed to a chair (including a wheelchair)? 3  -AR     Standing up from a chair using your arms (e.g., wheelchair, bedside chair)? 3  -AR     Climbing 3-5 steps with a railing? 2  -AR     To walk in hospital room? 3  -AR     AM-PAC 6 Clicks Score (PT) 16  -AR     Highest Level of Mobility Goal 5 --> Static standing  -AR       Row Name 07/31/24 1236          Functional Assessment    Outcome Measure Options AM-PAC 6 Clicks Basic Mobility (PT)  -AR               User Key  (r) = Recorded By, (t) = Taken By, (c) = Cosigned By      Initials Name Provider Type    Sarah Ivey PT Physical Therapist                                 Physical Therapy Education       Title: PT OT SLP Therapies (In Progress)       Topic: Physical Therapy (In Progress)        Point: Mobility training (In Progress)       Learning Progress Summary             Patient Acceptance, E, NR by AR at 7/31/2024 1237                         Point: Home exercise program (In Progress)       Learning Progress Summary             Patient Acceptance, E, NR by AR at 7/31/2024 1237                         Point: Body mechanics (In Progress)       Learning Progress Summary             Patient Acceptance, E, NR by AR at 7/31/2024 1237                         Point: Precautions (In Progress)       Learning Progress Summary             Patient Acceptance, E, NR by AR at 7/31/2024 1237                                         User Key       Initials Effective Dates Name Provider Type Discipline    AR 06/16/21 -  Sarah Knight PT Physical Therapist PT                  PT Recommendation and Plan  Planned Therapy Interventions (PT): balance training, bed mobility training, gait training, home exercise program, patient/family education, strengthening, transfer training  Outcome Evaluation: S/p CABG x4 and MVR on 7/30. Pt reports normally independent, no use of AD, lives alone, spouse passed away in February, 19 yo dtr planning to stay w/ pt.  Pt able to ambulate 75' w/ contact assist using RW, slow guarded gait but no safety concerns. Anticpate DC to home with assist as needed.     Time Calculation:         PT Charges       Row Name 07/31/24 1221             Time Calculation    Start Time 0845  -AR      Stop Time 0930  -AR      Time Calculation (min) 45 min  -AR      PT Received On 07/31/24  -AR      PT - Next Appointment 08/01/24  -AR      PT Goal Re-Cert Due Date 08/07/24  -AR                User Key  (r) = Recorded By, (t) = Taken By, (c) = Cosigned By      Initials Name Provider Type    AR Sarah Knight PT Physical Therapist                  Therapy Charges for Today       Code Description Service Date Service Provider Modifiers Qty    80922848876  PT EVAL MOD COMPLEXITY 3 7/31/2024 Sarah Knight,  PT GP 1    [id number]  PT THER PROC EA 15 MIN 7/31/2024 Sarah Knight, PT GP 1            PT G-Codes  Outcome Measure Options: AM-PAC 6 Clicks Basic Mobility (PT)  AM-PAC 6 Clicks Score (PT): 16  PT Discharge Summary  Anticipated Discharge Disposition (PT): home with assist    Sarah Knight PT  7/31/2024

## 2024-07-31 NOTE — PLAN OF CARE
Goal Outcome Evaluation:  Plan of Care Reviewed With: patient        Progress: improving  Outcome Evaluation: POD #1 s/p CABG x4, MVR, Pain medicine given per MAR, 2L/NC, up in chair w/o any issues, will continue with plan of care.

## 2024-08-01 ENCOUNTER — APPOINTMENT (OUTPATIENT)
Dept: GENERAL RADIOLOGY | Facility: HOSPITAL | Age: 69
DRG: 220 | End: 2024-08-01
Payer: MEDICARE

## 2024-08-01 LAB
ANION GAP SERPL CALCULATED.3IONS-SCNC: 10 MMOL/L (ref 5–15)
BUN SERPL-MCNC: 30 MG/DL (ref 8–23)
BUN/CREAT SERPL: 22.6 (ref 7–25)
CALCIUM SPEC-SCNC: 9.3 MG/DL (ref 8.6–10.5)
CHLORIDE SERPL-SCNC: 102 MMOL/L (ref 98–107)
CO2 SERPL-SCNC: 22 MMOL/L (ref 22–29)
CREAT SERPL-MCNC: 1.33 MG/DL (ref 0.76–1.27)
DEPRECATED RDW RBC AUTO: 45.3 FL (ref 37–54)
EGFRCR SERPLBLD CKD-EPI 2021: 57.1 ML/MIN/1.73
ERYTHROCYTE [DISTWIDTH] IN BLOOD BY AUTOMATED COUNT: 14.8 % (ref 12.3–15.4)
GLUCOSE BLDC GLUCOMTR-MCNC: 129 MG/DL (ref 70–130)
GLUCOSE BLDC GLUCOMTR-MCNC: 132 MG/DL (ref 70–130)
GLUCOSE BLDC GLUCOMTR-MCNC: 172 MG/DL (ref 70–130)
GLUCOSE BLDC GLUCOMTR-MCNC: 180 MG/DL (ref 70–130)
GLUCOSE SERPL-MCNC: 148 MG/DL (ref 65–99)
HCT VFR BLD AUTO: 27.8 % (ref 37.5–51)
HGB BLD-MCNC: 9.2 G/DL (ref 13–17.7)
INR PPP: 1.29 (ref 0.9–1.1)
MCH RBC QN AUTO: 28 PG (ref 26.6–33)
MCHC RBC AUTO-ENTMCNC: 33.1 G/DL (ref 31.5–35.7)
MCV RBC AUTO: 84.5 FL (ref 79–97)
PLATELET # BLD AUTO: 143 10*3/MM3 (ref 140–450)
PMV BLD AUTO: 10 FL (ref 6–12)
POTASSIUM SERPL-SCNC: 4.2 MMOL/L (ref 3.5–5.2)
PROTHROMBIN TIME: 16.4 SECONDS (ref 11.7–14.2)
QT INTERVAL: 391 MS
QTC INTERVAL: 468 MS
RBC # BLD AUTO: 3.29 10*6/MM3 (ref 4.14–5.8)
SODIUM SERPL-SCNC: 134 MMOL/L (ref 136–145)
WBC NRBC COR # BLD AUTO: 11.85 10*3/MM3 (ref 3.4–10.8)

## 2024-08-01 PROCEDURE — 97530 THERAPEUTIC ACTIVITIES: CPT

## 2024-08-01 PROCEDURE — 93010 ELECTROCARDIOGRAM REPORT: CPT | Performed by: INTERNAL MEDICINE

## 2024-08-01 PROCEDURE — 71045 X-RAY EXAM CHEST 1 VIEW: CPT

## 2024-08-01 PROCEDURE — 85610 PROTHROMBIN TIME: CPT | Performed by: NURSE PRACTITIONER

## 2024-08-01 PROCEDURE — 25010000002 CEFAZOLIN PER 500 MG: Performed by: NURSE PRACTITIONER

## 2024-08-01 PROCEDURE — 80048 BASIC METABOLIC PNL TOTAL CA: CPT | Performed by: NURSE PRACTITIONER

## 2024-08-01 PROCEDURE — 85027 COMPLETE CBC AUTOMATED: CPT | Performed by: NURSE PRACTITIONER

## 2024-08-01 PROCEDURE — 25010000002 FUROSEMIDE PER 20 MG: Performed by: NURSE PRACTITIONER

## 2024-08-01 PROCEDURE — 25010000002 ENOXAPARIN PER 10 MG: Performed by: NURSE PRACTITIONER

## 2024-08-01 PROCEDURE — 93005 ELECTROCARDIOGRAM TRACING: CPT | Performed by: NURSE PRACTITIONER

## 2024-08-01 PROCEDURE — 63710000001 INSULIN LISPRO (HUMAN) PER 5 UNITS: Performed by: NURSE PRACTITIONER

## 2024-08-01 PROCEDURE — 82948 REAGENT STRIP/BLOOD GLUCOSE: CPT

## 2024-08-01 PROCEDURE — 97110 THERAPEUTIC EXERCISES: CPT

## 2024-08-01 PROCEDURE — 94799 UNLISTED PULMONARY SVC/PX: CPT

## 2024-08-01 RX ORDER — FUROSEMIDE 10 MG/ML
40 INJECTION INTRAMUSCULAR; INTRAVENOUS ONCE
Status: COMPLETED | OUTPATIENT
Start: 2024-08-01 | End: 2024-08-01

## 2024-08-01 RX ORDER — POTASSIUM CHLORIDE 750 MG/1
20 TABLET, FILM COATED, EXTENDED RELEASE ORAL ONCE
Status: COMPLETED | OUTPATIENT
Start: 2024-08-01 | End: 2024-08-01

## 2024-08-01 RX ORDER — METOPROLOL TARTRATE 50 MG/1
50 TABLET, FILM COATED ORAL EVERY 12 HOURS SCHEDULED
Status: DISCONTINUED | OUTPATIENT
Start: 2024-08-01 | End: 2024-08-03

## 2024-08-01 RX ADMIN — METOPROLOL TARTRATE 25 MG: 25 TABLET, FILM COATED ORAL at 11:19

## 2024-08-01 RX ADMIN — MUPIROCIN 1 APPLICATION: 20 OINTMENT TOPICAL at 11:21

## 2024-08-01 RX ADMIN — ESCITALOPRAM 5 MG: 5 TABLET, FILM COATED ORAL at 11:19

## 2024-08-01 RX ADMIN — ATORVASTATIN CALCIUM 40 MG: 20 TABLET, FILM COATED ORAL at 20:21

## 2024-08-01 RX ADMIN — 0.12% CHLORHEXIDINE GLUCONATE 15 ML: 1.2 RINSE ORAL at 11:20

## 2024-08-01 RX ADMIN — SODIUM CHLORIDE 2000 MG: 900 INJECTION INTRAVENOUS at 02:50

## 2024-08-01 RX ADMIN — GUAIFENESIN 1200 MG: 600 TABLET, EXTENDED RELEASE ORAL at 11:43

## 2024-08-01 RX ADMIN — ENOXAPARIN SODIUM 40 MG: 100 INJECTION SUBCUTANEOUS at 11:18

## 2024-08-01 RX ADMIN — MUPIROCIN 1 APPLICATION: 20 OINTMENT TOPICAL at 20:22

## 2024-08-01 RX ADMIN — PANTOPRAZOLE SODIUM 40 MG: 40 TABLET, DELAYED RELEASE ORAL at 06:15

## 2024-08-01 RX ADMIN — SENNOSIDES AND DOCUSATE SODIUM 2 TABLET: 50; 8.6 TABLET ORAL at 20:21

## 2024-08-01 RX ADMIN — FUROSEMIDE 40 MG: 10 INJECTION, SOLUTION INTRAMUSCULAR; INTRAVENOUS at 11:18

## 2024-08-01 RX ADMIN — GUAIFENESIN 1200 MG: 600 TABLET, EXTENDED RELEASE ORAL at 20:21

## 2024-08-01 RX ADMIN — METOPROLOL TARTRATE 50 MG: 50 TABLET, FILM COATED ORAL at 20:21

## 2024-08-01 RX ADMIN — WARFARIN 2 MG: 2 TABLET ORAL at 19:40

## 2024-08-01 RX ADMIN — GABAPENTIN 100 MG: 100 CAPSULE ORAL at 11:17

## 2024-08-01 RX ADMIN — INSULIN LISPRO 2 UNITS: 100 INJECTION, SOLUTION INTRAVENOUS; SUBCUTANEOUS at 17:16

## 2024-08-01 RX ADMIN — GABAPENTIN 100 MG: 100 CAPSULE ORAL at 20:21

## 2024-08-01 RX ADMIN — ASPIRIN 81 MG: 81 TABLET, COATED ORAL at 11:19

## 2024-08-01 RX ADMIN — TAMSULOSIN HYDROCHLORIDE 0.4 MG: 0.4 CAPSULE ORAL at 11:19

## 2024-08-01 RX ADMIN — INSULIN LISPRO 2 UNITS: 100 INJECTION, SOLUTION INTRAVENOUS; SUBCUTANEOUS at 11:31

## 2024-08-01 RX ADMIN — POTASSIUM CHLORIDE 20 MEQ: 750 TABLET, EXTENDED RELEASE ORAL at 11:18

## 2024-08-01 NOTE — PROGRESS NOTES
Enter Query Response Below      Query Response: - Heart failure due to ischemic cardiomyopathy Electronically signed by Rubén Sanon MD, 24, 12:22 PM EDT.               If applicable, please update the problem list.     Patient: Sudhir Diaz        : 1955  Account: [id number]           Admit Date: 2024        How to Respond to this query:       a. Click New Note     b. Answer query within the yellow box.                c. Update the Problem List, if applicable.      If you have any questions about this query contact me at: [email or web address]     Dr. Sanon,     69-year-old male admitted with coronary artery disease of native artery of native heart with stable angina pectoris and mitral regurgitation that underwent CABG x4, mitral valve repair & closure of left atrial appendage. Patient has a documented history of hypertension, ischemic cardiomyopathy & valvular heart disease. Patient's home medications include Aspirin, Lipitor, Coreg, Entresto & Imdur. The patient was treated with Lasix 40mg IV x2 doses, Aspirin, Lipitor, Lopressor.    Please clarify the etiology of the patient’s heart failure:    - Heart failure due to hypertension  - Heart failure due to ischemic cardiomyopathy  - Heart failure due to valvular disease  - Heart failure due to hypertension, ischemic cardiomyopathy and valvular disease  - Other- specify __________  - Unable to determine    By submitting this query, we are merely seeking further clarification of documentation to accurately reflect all conditions that you are monitoring, evaluating, treating or that extend the hospitalization or utilize additional resources of care. Please utilize your independent clinical judgment when addressing the question(s) above.     This query and your response, once completed, will be entered into the legal medical record.    Sincerely,  Nicholas Conti RN   Clinical Documentation Integrity Program

## 2024-08-01 NOTE — PLAN OF CARE
Goal Outcome Evaluation:  Plan of Care Reviewed With: patient           Outcome Evaluation: Pt demonstrates increased activity tolerance and functional strength as he was able to increase his gait distance. Pt did have some impaired balance especially on the turns during ambulation and required some increased assistance to correct. PT will continue to follow to address strength, mobility, and gait.      Anticipated Discharge Disposition (PT): home with assist

## 2024-08-01 NOTE — PLAN OF CARE
Goal Outcome Evaluation:  Plan of Care Reviewed With: patient        Progress: no change  Outcome Evaluation: A&O, medicated for pain per MAR with relief, 1 person assist, CT, yulissa, MARTIN, EPM off, VSS, continue plan of care.

## 2024-08-01 NOTE — PLAN OF CARE
Goal Outcome Evaluation:  Plan of Care Reviewed With: patient        Progress: improving  Outcome Evaluation: Pt is SR on tele. VSS. Pt ambulates Ax1. Chest tubes and pacer wires remain per MD order. Pt is POD 2. Central line and duenas removed today per MD order. Pt had no complaints of pain throughout the shift. No further complaints as of the present.

## 2024-08-01 NOTE — THERAPY TREATMENT NOTE
Patient Name: Sudhir Diaz  : 1955    MRN: [id number]                              Today's Date: 2024       Admit Date: 2024    Visit Dx:     ICD-10-CM ICD-9-CM   1. S/P CABG (coronary artery bypass graft)  Z95.1 V45.81   2. Coronary artery disease of native artery of native heart with stable angina pectoris  I25.118 414.01     413.9     Patient Active Problem List   Diagnosis    Shortness of breath    Essential hypertension    Chest discomfort    LBBB (left bundle branch block)    Abnormal nuclear stress test    Coronary artery disease of native artery of native heart with stable angina pectoris    Ischemic cardiomyopathy    Mitral incompetence    CAD (coronary artery disease), native coronary artery     Past Medical History:   Diagnosis Date    Anxiety     Arthritis     At risk for sleep apnea     5    Back pain     started 2024 in lower back happens sporadically  no noticable blood in urine  no c/o nausea/ vomiting    Cardiomyopathy     Coronary artery disease     Heart valve disease     mitral    Hyperlipidemia     Hypertension      Past Surgical History:   Procedure Laterality Date    CARDIAC CATHETERIZATION N/A 2024    Procedure: Left and Right Heart Cath with Coronary Angiography;  Surgeon: Peg Pettit MD;  Location: [facility] CATH INVASIVE LOCATION;  Service: Cardiovascular;  Laterality: N/A;    CARPAL TUNNEL RELEASE Bilateral     CHOLECYSTECTOMY      COLONOSCOPY      CORONARY ARTERY BYPASS GRAFT WITH MITRAL VALVE REPAIR/REPLACEMENT N/A 2024    Procedure: STERNOTOMY, CORONARY ARTERY BYPASS TIMES FOUR  WITH LEFT INTERNAL MAMMARY ARTERY GRAFT AND LEFT SAPHENOUS  ENDOSCOPIC HARVESTED VEIN GRAFT; MITRAL VALVE REPAIR; TRANSESOPHAGEAL ECHOCARDIOGRAM WITH ANESTHESIA, PRP.;  Surgeon: Jr Rubén Sanon MD;  Location: [facility];  Service: Cardiothoracic;  Laterality: N/A;    SHOULDER SURGERY Right       General Information       Row Name 24 1619          Physical  Therapy Time and Intention    Document Type therapy note (daily note)  -     Mode of Treatment individual therapy;physical therapy  -       Row Name 08/01/24 1619          General Information    Existing Precautions/Restrictions fall;cardiac  -       Row Name 08/01/24 1619          Cognition    Orientation Status (Cognition) oriented x 3  -       Row Name 08/01/24 1619          Safety Issues, Functional Mobility    Impairments Affecting Function (Mobility) balance;endurance/activity tolerance;pain;strength  -               User Key  (r) = Recorded By, (t) = Taken By, (c) = Cosigned By      Initials Name Provider Type     Zulma Merino PT Physical Therapist                   Mobility       Row Name 08/01/24 1619          Bed Mobility    Bed Mobility supine-sit;sit-supine  -     Supine-Sit [place] (Bed Mobility) not tested  -     Sit-Supine [place] (Bed Mobility) not tested  -     Comment, (Bed Mobility) sitting in chair  -       Row Name 08/01/24 1619          Sit-Stand Transfer    Sit-Stand [place] (Transfers) verbal cues;nonverbal cues (demo/gesture);contact guard  -     Assistive Device (Sit-Stand Transfers) walker, front-wheeled  -       Row Name 08/01/24 1619          Gait/Stairs (Locomotion)    [place] Level (Gait) verbal cues;nonverbal cues (demo/gesture);contact guard;minimum assist (75% patient effort)  -     Assistive Device (Gait) walker, front-wheeled  -     Distance in Feet (Gait) 200  -     Deviations/Abnormal Patterns (Gait) buddy decreased;gait speed decreased;stride length decreased  -     Bilateral Gait Deviations forward flexed posture  -     Comment, (Gait/Stairs) pt with LOB x2 requiring Yamilka to correct  -               User Key  (r) = Recorded By, (t) = Taken By, (c) = Cosigned By      Initials Name Provider Type     Zulma Merino PT Physical Therapist                   Obj/Interventions       Row Name 08/01/24 1624           Motor Skills    Therapeutic Exercise --  10 reps cardiac protocol  -               User Key  (r) = Recorded By, (t) = Taken By, (c) = Cosigned By      Initials Name Provider Type    Zulma Turner PT Physical Therapist                   Goals/Plan    No documentation.                  Clinical Impression       Row Name 08/01/24 1622          Pain    Pretreatment Pain Rating 3/10  -CH     Posttreatment Pain Rating 3/10  -CH     Pain Location incisional  -     Pain Location - chest  -     Pain Intervention(s) Repositioned  -       Row Name 08/01/24 1622          Plan of Care Review    Plan of Care Reviewed With patient  -     Outcome Evaluation Pt demonstrates increased activity tolerance and functional strength as he was able to increase his gait distance. Pt did have some impaired balance especially on the turns during ambulation and required some increased assistance to correct. PT will continue to follow to address strength, mobility, and gait.  -       Row Name 08/01/24 1622          Positioning and Restraints    Pre-Treatment Position sitting in chair/recliner  -     Post Treatment Position chair  -     In Chair reclined;call light within reach;encouraged to call for assist;exit alarm on;with family/caregiver  -               User Key  (r) = Recorded By, (t) = Taken By, (c) = Cosigned By      Initials Name Provider Type    Zulma Turner PT Physical Therapist                   Outcome Measures       Row Name 08/01/24 1624 08/01/24 0859       How much help from another person do you currently need...    Turning from your back to your side while in flat bed without using bedrails? 3  -CH 4  -MB    Moving from lying on back to sitting on the side of a flat bed without bedrails? 3  -CH 4  -MB    Moving to and from a bed to a chair (including a wheelchair)? 3  -CH 4  -MB    Standing up from a chair using your arms (e.g., wheelchair, bedside chair)? 3  -CH 4  -MB    Climbing 3-5 steps  with a railing? 3  - 3  -MB    To walk in hospital room? 3  - 3  -MB    AM-PAC 6 Clicks Score (PT) 18  - 22  -MB    Highest Level of Mobility Goal 6 --> Walk 10 steps or more  - 7 --> Walk 25 feet or more  -MB      Row Name 08/01/24 1624          Functional Assessment    Outcome Measure Options AM-PAC 6 Clicks Basic Mobility (PT)  -               User Key  (r) = Recorded By, (t) = Taken By, (c) = Cosigned By      Initials Name Provider Type     Zulma Merino, PT Physical Therapist    Nina Guevara, RN Registered Nurse                                 Physical Therapy Education       Title: PT OT SLP Therapies (Done)       Topic: Physical Therapy (Done)       Point: Mobility training (Done)       Learning Progress Summary             Patient Acceptance, E,TB,D, VU,NR by  at 8/1/2024 1624    Acceptance, E, NR by AR at 7/31/2024 1237                         Point: Home exercise program (Done)       Learning Progress Summary             Patient Acceptance, E,TB,D, VU,NR by  at 8/1/2024 1624    Acceptance, E, NR by AR at 7/31/2024 1237                         Point: Body mechanics (Done)       Learning Progress Summary             Patient Acceptance, E,TB,D, VU,NR by  at 8/1/2024 1624    Acceptance, E, NR by AR at 7/31/2024 1237                         Point: Precautions (Done)       Learning Progress Summary             Patient Acceptance, E,TB,D, VU,NR by  at 8/1/2024 1624    Acceptance, E, NR by AR at 7/31/2024 1237                                         User Key       Initials Effective Dates Name Provider Type Discipline     06/16/21 -  Zulma Merino, PT Physical Therapist PT    AR 06/16/21 -  Sarah Knight PT Physical Therapist PT                  PT Recommendation and Plan     Plan of Care Reviewed With: patient  Outcome Evaluation: Pt demonstrates increased activity tolerance and functional strength as he was able to increase his gait distance. Pt did have some impaired  balance especially on the turns during ambulation and required some increased assistance to correct. PT will continue to follow to address strength, mobility, and gait.     Time Calculation:         PT Charges       Row Name 08/01/24 1625             Time Calculation    Start Time 1534  -      Stop Time 1557  -      Time Calculation (min) 23 min  -CH      PT Received On 08/01/24  -      PT - Next Appointment 08/02/24  -         Time Calculation- PT    Total Timed Code Minutes- PT 23 minute(s)  -CH         Timed Charges    76044 - PT Therapeutic Exercise Minutes 10  -CH      34677 - PT Therapeutic Activity Minutes 13  -CH         Total Minutes    Timed Charges Total Minutes 23  -CH       Total Minutes 23  -CH                User Key  (r) = Recorded By, (t) = Taken By, (c) = Cosigned By      Initials Name Provider Type     Zulma Merino, PT Physical Therapist                  Therapy Charges for Today       Code Description Service Date Service Provider Modifiers Qty    33088042485  PT THER PROC EA 15 MIN 8/1/2024 Zulma Merino, PT GP 1    [id number]  PT THERAPEUTIC ACT EA 15 MIN 8/1/2024 Zulma Merino, PT GP 1            PT G-Codes  Outcome Measure Options: AM-PAC 6 Clicks Basic Mobility (PT)  AM-PAC 6 Clicks Score (PT): 18  PT Discharge Summary  Anticipated Discharge Disposition (PT): home with assist    Zulma Merino PT  8/1/2024

## 2024-08-01 NOTE — PROGRESS NOTES
LOS: 2 days   Patient Care Team:  Xavier Walden MD as PCP - General (Internal Medicine)  Peg Pettit MD as Consulting Physician (Cardiology)    Chief Complaint: post op     Subjective      Vital Signs  Temp:  [97.6 °F (36.4 °C)-99.1 °F (37.3 °C)] 98.5 °F (36.9 °C)  Heart Rate:  [73-89] 84  Resp:  [16-18] 16  BP: ()/(51-77) 122/74  Body mass index is 29.52 kg/m².    Intake/Output Summary (Last 24 hours) at 8/1/2024 0836  Last data filed at 8/1/2024 0400  Gross per 24 hour   Intake 240 ml   Output 1455 ml   Net -1215 ml     No intake/output data recorded.    Chest tube drainage last 8 [id number]        07/31/24  0600 08/01/24  0549   Weight: 80.7 kg (177 lb 14.6 oz) 85.5 kg (188 lb 8 oz)         Objective:  Vital signs: (most recent): Blood pressure 122/74, pulse 84, temperature 98.5 °F (36.9 °C), temperature source Oral, resp. rate 16, weight 85.5 kg (188 lb 8 oz), SpO2 96%.                Physical Exam:   General Appearance: awake and alert, no acute distress   Lungs: clear to auscultation, respirations regular, respirations even, and respirations unlabored   Heart: regular rhythm & normal rate, normal S1, S2, no murmur, no gallop, no rub, and no click   Abdomen: soft, hypoactive bowel sounds    Skin: sternal incision clean, dry, intact   Neuro: alert and oriented, no focal deficits.     Results Review:        WBC WBC   Date Value Ref Range Status   08/01/2024 11.85 (H) 3.40 - 10.80 10*3/mm3 Final   07/31/2024 9.85 3.40 - 10.80 10*3/mm3 Final   07/30/2024 14.65 (H) 3.40 - 10.80 10*3/mm3 Final   07/30/2024 13.17 (H) 3.40 - 10.80 10*3/mm3 Final   07/30/2024 6.69 3.40 - 10.80 10*3/mm3 Final      HGB Hemoglobin   Date Value Ref Range Status   08/01/2024 9.2 (L) 13.0 - 17.7 g/dL Final   07/31/2024 9.4 (L) 13.0 - 17.7 g/dL Final   07/30/2024 10.4 (L) 13.0 - 17.7 g/dL Final   07/30/2024 11.7 (L) 13.0 - 17.7 g/dL Final   07/30/2024 8.5 (L) 13.0 - 17.7 g/dL Final   07/30/2024 9.9 (L) 12.0 - 17.0 g/dL Final    07/30/2024 10.2 (L) 12.0 - 17.0 g/dL Final   07/30/2024 9.9 (L) 12.0 - 17.0 g/dL Final   07/30/2024 9.5 (L) 12.0 - 17.0 g/dL Final   07/30/2024 9.5 (L) 12.0 - 17.0 g/dL Final   07/30/2024 11.9 (L) 12.0 - 17.0 g/dL Final      HCT Hematocrit   Date Value Ref Range Status   08/01/2024 27.8 (L) 37.5 - 51.0 % Final   07/31/2024 28.9 (L) 37.5 - 51.0 % Final   07/30/2024 31.1 (L) 37.5 - 51.0 % Final   07/30/2024 35.4 (L) 37.5 - 51.0 % Final   07/30/2024 25.9 (L) 37.5 - 51.0 % Final   07/30/2024 29 (L) 38 - 51 % Final   07/30/2024 30 (L) 38 - 51 % Final   07/30/2024 29 (L) 38 - 51 % Final   07/30/2024 28 (L) 38 - 51 % Final   07/30/2024 28 (L) 38 - 51 % Final   07/30/2024 35 (L) 38 - 51 % Final      Platelets Platelets   Date Value Ref Range Status   08/01/2024 143 140 - 450 10*3/mm3 Final   07/31/2024 146 140 - 450 10*3/mm3 Final   07/30/2024 160 140 - 450 10*3/mm3 Final   07/30/2024 146 140 - 450 10*3/mm3 Final   07/30/2024 130 (L) 140 - 450 10*3/mm3 Final        PT/INR:    Protime   Date Value Ref Range Status   08/01/2024 16.4 (H) 11.7 - 14.2 Seconds Final   07/31/2024 15.9 (H) 11.7 - 14.2 Seconds Final   07/30/2024 16.6 (H) 11.7 - 14.2 Seconds Final   07/30/2024 28.1 (H) 11.7 - 14.2 Seconds Final   /  INR   Date Value Ref Range Status   08/01/2024 1.29 (H) 0.90 - 1.10 Final   07/31/2024 1.25 (H) 0.90 - 1.10 Final   07/30/2024 1.32 (H) 0.90 - 1.10 Final   07/30/2024 2.60 (H) 0.90 - 1.10 Final       Sodium Sodium   Date Value Ref Range Status   08/01/2024 134 (L) 136 - 145 mmol/L Final   07/31/2024 142 136 - 145 mmol/L Final   07/30/2024 143 136 - 145 mmol/L Final   07/30/2024 142 136 - 145 mmol/L Final      Potassium Potassium   Date Value Ref Range Status   08/01/2024 4.2 3.5 - 5.2 mmol/L Final   07/31/2024 4.1 3.5 - 5.2 mmol/L Final   07/30/2024 4.2 3.5 - 5.2 mmol/L Final   07/30/2024 3.9 3.5 - 5.2 mmol/L Final     Comment:     Slight hemolysis detected by analyzer. Result may be falsely elevated.      Chloride  Chloride   Date Value Ref Range Status   08/01/2024 102 98 - 107 mmol/L Final   07/31/2024 110 (H) 98 - 107 mmol/L Final   07/30/2024 113 (H) 98 - 107 mmol/L Final   07/30/2024 112 (H) 98 - 107 mmol/L Final      Bicarbonate CO2   Date Value Ref Range Status   08/01/2024 22.0 22.0 - 29.0 mmol/L Final   07/31/2024 17.7 (L) 22.0 - 29.0 mmol/L Final   07/30/2024 19.6 (L) 22.0 - 29.0 mmol/L Final   07/30/2024 21.9 (L) 22.0 - 29.0 mmol/L Final      BUN BUN   Date Value Ref Range Status   08/01/2024 30 (H) 8 - 23 mg/dL Final   07/31/2024 19 8 - 23 mg/dL Final   07/30/2024 17 8 - 23 mg/dL Final   07/30/2024 14 8 - 23 mg/dL Final      Creatinine Creatinine   Date Value Ref Range Status   08/01/2024 1.33 (H) 0.76 - 1.27 mg/dL Final   07/31/2024 1.07 0.76 - 1.27 mg/dL Final   07/30/2024 1.06 0.76 - 1.27 mg/dL Final   07/30/2024 1.07 0.76 - 1.27 mg/dL Final      Calcium Calcium   Date Value Ref Range Status   08/01/2024 9.3 8.6 - 10.5 mg/dL Final   07/31/2024 9.2 8.6 - 10.5 mg/dL Final   07/30/2024 8.7 8.6 - 10.5 mg/dL Final   07/30/2024 9.7 8.6 - 10.5 mg/dL Final      Magnesium Magnesium   Date Value Ref Range Status   07/31/2024 2.7 (H) 1.6 - 2.4 mg/dL Final   07/30/2024 2.8 (H) 1.6 - 2.4 mg/dL Final   07/30/2024 3.1 (H) 1.6 - 2.4 mg/dL Final          aspirin, 81 mg, Oral, Daily  atorvastatin, 40 mg, Oral, Nightly  chlorhexidine, 15 mL, Mouth/Throat, Q12H  enoxaparin, 40 mg, Subcutaneous, Daily  escitalopram, 5 mg, Oral, Daily  gabapentin, 100 mg, Oral, Q12H  guaiFENesin, 1,200 mg, Oral, Q12H  insulin lispro, 2-9 Units, Subcutaneous, 4x Daily AC & at Bedtime  metoprolol tartrate, 25 mg, Oral, Q12H  mupirocin, , Each Nare, BID  pantoprazole, 40 mg, Intravenous, Once   Followed by  pantoprazole, 40 mg, Oral, QAM  senna-docusate sodium, 2 tablet, Oral, Nightly  tamsulosin, 0.4 mg, Oral, Daily  warfarin, 2 mg, Oral, Daily      DOPamine, 2-20 mcg/kg/min  milrinone, 0.125 mcg/kg/min, Last Rate: 0.125 mcg/kg/min (07/31/24  1025)  sodium chloride, 30 mL/hr, Last Rate: 30 mL/hr (07/30/24 1319)              Coronary artery disease of native artery of native heart with stable angina pectoris    CAD (coronary artery disease), native coronary artery      Assessment & Plan    - coronary artery disease- s/pCABGx4 MVr, HEIDI- [place]  - mitral regurgitation  - HTN  - HLD  - cardiomyopathy, EF 26-35% on TTE  -LV thrombus  -post op anemia- expected acute blood loss     POD#2  Looks good this morning.  Up in the chair  2L NC-- wean as able  Sinus rhythm rate in the 80s-- tolerating beta blocker  Creatinine increased slightly-- 1.33 this morning.  Weight up and , will gently diurese today  Encourage pulmonary toilet   Mobilize/increase activity  Coumadin to start tonight for LV thrombus  Discontinue central line and duenas.  Will see how much he drains after ambulation.  Continue routine care    DONITA Cruz  08/01/24  08:36 EDT

## 2024-08-02 ENCOUNTER — APPOINTMENT (OUTPATIENT)
Dept: GENERAL RADIOLOGY | Facility: HOSPITAL | Age: 69
DRG: 220 | End: 2024-08-02
Payer: MEDICARE

## 2024-08-02 LAB
ANION GAP SERPL CALCULATED.3IONS-SCNC: 14 MMOL/L (ref 5–15)
ANION GAP SERPL CALCULATED.3IONS-SCNC: 16 MMOL/L (ref 5–15)
BACTERIA UR QL AUTO: ABNORMAL /HPF
BILIRUB UR QL STRIP: NEGATIVE
BUN SERPL-MCNC: 30 MG/DL (ref 8–23)
BUN SERPL-MCNC: 32 MG/DL (ref 8–23)
BUN/CREAT SERPL: 28 (ref 7–25)
BUN/CREAT SERPL: 29.6 (ref 7–25)
CALCIUM SPEC-SCNC: 9.3 MG/DL (ref 8.6–10.5)
CALCIUM SPEC-SCNC: 9.6 MG/DL (ref 8.6–10.5)
CHLORIDE SERPL-SCNC: 100 MMOL/L (ref 98–107)
CHLORIDE SERPL-SCNC: 101 MMOL/L (ref 98–107)
CLARITY UR: CLEAR
CO2 SERPL-SCNC: 18 MMOL/L (ref 22–29)
CO2 SERPL-SCNC: 21 MMOL/L (ref 22–29)
COLOR UR: YELLOW
CREAT SERPL-MCNC: 1.07 MG/DL (ref 0.76–1.27)
CREAT SERPL-MCNC: 1.08 MG/DL (ref 0.76–1.27)
DEPRECATED RDW RBC AUTO: 44.5 FL (ref 37–54)
EGFRCR SERPLBLD CKD-EPI 2021: 73.4 ML/MIN/1.73
EGFRCR SERPLBLD CKD-EPI 2021: 74.2 ML/MIN/1.73
ERYTHROCYTE [DISTWIDTH] IN BLOOD BY AUTOMATED COUNT: 14.5 % (ref 12.3–15.4)
GLUCOSE BLDC GLUCOMTR-MCNC: 146 MG/DL (ref 70–130)
GLUCOSE BLDC GLUCOMTR-MCNC: 147 MG/DL (ref 70–130)
GLUCOSE BLDC GLUCOMTR-MCNC: 149 MG/DL (ref 70–130)
GLUCOSE BLDC GLUCOMTR-MCNC: 154 MG/DL (ref 70–130)
GLUCOSE SERPL-MCNC: 131 MG/DL (ref 65–99)
GLUCOSE SERPL-MCNC: 140 MG/DL (ref 65–99)
GLUCOSE UR STRIP-MCNC: NEGATIVE MG/DL
HCT VFR BLD AUTO: 28 % (ref 37.5–51)
HGB BLD-MCNC: 9.2 G/DL (ref 13–17.7)
HGB UR QL STRIP.AUTO: ABNORMAL
HYALINE CASTS UR QL AUTO: ABNORMAL /LPF
INR PPP: 1.33 (ref 0.9–1.1)
KETONES UR QL STRIP: NEGATIVE
LEUKOCYTE ESTERASE UR QL STRIP.AUTO: NEGATIVE
MCH RBC QN AUTO: 28 PG (ref 26.6–33)
MCHC RBC AUTO-ENTMCNC: 32.9 G/DL (ref 31.5–35.7)
MCV RBC AUTO: 85.1 FL (ref 79–97)
NITRITE UR QL STRIP: NEGATIVE
PH UR STRIP.AUTO: 5.5 [PH] (ref 5–8)
PLATELET # BLD AUTO: 148 10*3/MM3 (ref 140–450)
PMV BLD AUTO: 10.3 FL (ref 6–12)
POTASSIUM SERPL-SCNC: 4.4 MMOL/L (ref 3.5–5.2)
POTASSIUM SERPL-SCNC: 4.7 MMOL/L (ref 3.5–5.2)
PROT UR QL STRIP: ABNORMAL
PROTHROMBIN TIME: 16.7 SECONDS (ref 11.7–14.2)
RBC # BLD AUTO: 3.29 10*6/MM3 (ref 4.14–5.8)
RBC # UR STRIP: ABNORMAL /HPF
REF LAB TEST METHOD: ABNORMAL
SODIUM SERPL-SCNC: 135 MMOL/L (ref 136–145)
SODIUM SERPL-SCNC: 135 MMOL/L (ref 136–145)
SP GR UR STRIP: 1.01 (ref 1–1.03)
SQUAMOUS #/AREA URNS HPF: ABNORMAL /HPF
UROBILINOGEN UR QL STRIP: ABNORMAL
WBC # UR STRIP: ABNORMAL /HPF
WBC NRBC COR # BLD AUTO: 11.11 10*3/MM3 (ref 3.4–10.8)

## 2024-08-02 PROCEDURE — 63710000001 INSULIN LISPRO (HUMAN) PER 5 UNITS: Performed by: NURSE PRACTITIONER

## 2024-08-02 PROCEDURE — 94761 N-INVAS EAR/PLS OXIMETRY MLT: CPT

## 2024-08-02 PROCEDURE — 82948 REAGENT STRIP/BLOOD GLUCOSE: CPT

## 2024-08-02 PROCEDURE — 85027 COMPLETE CBC AUTOMATED: CPT | Performed by: NURSE PRACTITIONER

## 2024-08-02 PROCEDURE — 94760 N-INVAS EAR/PLS OXIMETRY 1: CPT

## 2024-08-02 PROCEDURE — 80048 BASIC METABOLIC PNL TOTAL CA: CPT | Performed by: NURSE PRACTITIONER

## 2024-08-02 PROCEDURE — 94799 UNLISTED PULMONARY SVC/PX: CPT

## 2024-08-02 PROCEDURE — 71045 X-RAY EXAM CHEST 1 VIEW: CPT

## 2024-08-02 PROCEDURE — 97530 THERAPEUTIC ACTIVITIES: CPT

## 2024-08-02 PROCEDURE — 25010000002 FUROSEMIDE PER 20 MG: Performed by: NURSE PRACTITIONER

## 2024-08-02 PROCEDURE — 25010000002 ENOXAPARIN PER 10 MG: Performed by: NURSE PRACTITIONER

## 2024-08-02 PROCEDURE — 85610 PROTHROMBIN TIME: CPT | Performed by: NURSE PRACTITIONER

## 2024-08-02 PROCEDURE — 81001 URINALYSIS AUTO W/SCOPE: CPT | Performed by: NURSE PRACTITIONER

## 2024-08-02 RX ORDER — QUETIAPINE FUMARATE 25 MG/1
12.5 TABLET, FILM COATED ORAL NIGHTLY PRN
Status: DISCONTINUED | OUTPATIENT
Start: 2024-08-02 | End: 2024-08-04 | Stop reason: HOSPADM

## 2024-08-02 RX ORDER — POTASSIUM CHLORIDE 750 MG/1
20 TABLET, FILM COATED, EXTENDED RELEASE ORAL ONCE
Status: COMPLETED | OUTPATIENT
Start: 2024-08-02 | End: 2024-08-02

## 2024-08-02 RX ORDER — FUROSEMIDE 10 MG/ML
40 INJECTION INTRAMUSCULAR; INTRAVENOUS ONCE
Status: COMPLETED | OUTPATIENT
Start: 2024-08-02 | End: 2024-08-02

## 2024-08-02 RX ADMIN — PANTOPRAZOLE SODIUM 40 MG: 40 TABLET, DELAYED RELEASE ORAL at 06:13

## 2024-08-02 RX ADMIN — INSULIN LISPRO 2 UNITS: 100 INJECTION, SOLUTION INTRAVENOUS; SUBCUTANEOUS at 06:37

## 2024-08-02 RX ADMIN — ESCITALOPRAM 5 MG: 5 TABLET, FILM COATED ORAL at 08:33

## 2024-08-02 RX ADMIN — SENNOSIDES AND DOCUSATE SODIUM 2 TABLET: 50; 8.6 TABLET ORAL at 20:18

## 2024-08-02 RX ADMIN — METOPROLOL TARTRATE 50 MG: 50 TABLET, FILM COATED ORAL at 20:18

## 2024-08-02 RX ADMIN — TAMSULOSIN HYDROCHLORIDE 0.4 MG: 0.4 CAPSULE ORAL at 08:33

## 2024-08-02 RX ADMIN — ENOXAPARIN SODIUM 40 MG: 100 INJECTION SUBCUTANEOUS at 08:34

## 2024-08-02 RX ADMIN — GUAIFENESIN 1200 MG: 600 TABLET, EXTENDED RELEASE ORAL at 20:18

## 2024-08-02 RX ADMIN — ATORVASTATIN CALCIUM 40 MG: 20 TABLET, FILM COATED ORAL at 20:17

## 2024-08-02 RX ADMIN — METOPROLOL TARTRATE 50 MG: 50 TABLET, FILM COATED ORAL at 08:34

## 2024-08-02 RX ADMIN — GUAIFENESIN 1200 MG: 600 TABLET, EXTENDED RELEASE ORAL at 08:33

## 2024-08-02 RX ADMIN — 0.12% CHLORHEXIDINE GLUCONATE 15 ML: 1.2 RINSE ORAL at 08:34

## 2024-08-02 RX ADMIN — MUPIROCIN 1 APPLICATION: 20 OINTMENT TOPICAL at 08:34

## 2024-08-02 RX ADMIN — WARFARIN 2 MG: 2 TABLET ORAL at 20:18

## 2024-08-02 RX ADMIN — ASPIRIN 81 MG: 81 TABLET, COATED ORAL at 08:33

## 2024-08-02 RX ADMIN — POTASSIUM CHLORIDE 20 MEQ: 750 TABLET, EXTENDED RELEASE ORAL at 16:32

## 2024-08-02 RX ADMIN — FUROSEMIDE 40 MG: 10 INJECTION, SOLUTION INTRAMUSCULAR; INTRAVENOUS at 16:32

## 2024-08-02 RX ADMIN — MUPIROCIN 1 APPLICATION: 20 OINTMENT TOPICAL at 20:18

## 2024-08-02 NOTE — PROGRESS NOTES
LOS: 3 days   Patient Care Team:  Xavier Walden MD as PCP - General (Internal Medicine)  Peg Pettit MD as Consulting Physician (Cardiology)    Chief Complaint: post op     Subjective      Vital Signs  Temp:  [98.3 °F (36.8 °C)-99.2 °F (37.3 °C)] 98.3 °F (36.8 °C)  Heart Rate:  [] 92  Resp:  [16-18] 18  BP: (109-145)/(55-75) 109/55  Body mass index is 29.42 kg/m².    Intake/Output Summary (Last 24 hours) at 8/2/2024 0732  Last data filed at 8/2/2024 0400  Gross per 24 hour   Intake --   Output 2300 ml   Net -2300 ml     No intake/output data recorded.          07/31/24  0600 08/01/24  0549 08/02/24  0600   Weight: 80.7 kg (177 lb 14.6 oz) 85.5 kg (188 lb 8 oz) 85.2 kg (187 lb 13.3 oz)         Objective:  Vital signs: (most recent): Blood pressure 118/73, pulse 87, temperature 98.4 °F (36.9 °C), temperature source Oral, resp. rate 18, weight 85.2 kg (187 lb 13.3 oz), SpO2 97%.                Physical Exam:   General Appearance: awake and alert, no acute distress   Lungs: clear to auscultation, respirations regular, respirations even, and respirations unlabored   Heart: regular rhythm & normal rate, normal S1, S2, no murmur, no gallop, no rub, and no click   Abdomen: soft, hypoactive bowel sounds    Skin: sternal incision clean, dry, intact   Neuro: alert and oriented, no focal deficits.     Results Review:        WBC WBC   Date Value Ref Range Status   08/02/2024 11.11 (H) 3.40 - 10.80 10*3/mm3 Final   08/01/2024 11.85 (H) 3.40 - 10.80 10*3/mm3 Final   07/31/2024 9.85 3.40 - 10.80 10*3/mm3 Final   07/30/2024 14.65 (H) 3.40 - 10.80 10*3/mm3 Final   07/30/2024 13.17 (H) 3.40 - 10.80 10*3/mm3 Final   07/30/2024 6.69 3.40 - 10.80 10*3/mm3 Final      HGB Hemoglobin   Date Value Ref Range Status   08/02/2024 9.2 (L) 13.0 - 17.7 g/dL Final   08/01/2024 9.2 (L) 13.0 - 17.7 g/dL Final   07/31/2024 9.4 (L) 13.0 - 17.7 g/dL Final   07/30/2024 10.4 (L) 13.0 - 17.7 g/dL Final   07/30/2024 11.7 (L) 13.0 - 17.7  g/dL Final   07/30/2024 8.5 (L) 13.0 - 17.7 g/dL Final   07/30/2024 9.9 (L) 12.0 - 17.0 g/dL Final   07/30/2024 10.2 (L) 12.0 - 17.0 g/dL Final   07/30/2024 9.9 (L) 12.0 - 17.0 g/dL Final   07/30/2024 9.5 (L) 12.0 - 17.0 g/dL Final   07/30/2024 9.5 (L) 12.0 - 17.0 g/dL Final   07/30/2024 11.9 (L) 12.0 - 17.0 g/dL Final      HCT Hematocrit   Date Value Ref Range Status   08/02/2024 28.0 (L) 37.5 - 51.0 % Final   08/01/2024 27.8 (L) 37.5 - 51.0 % Final   07/31/2024 28.9 (L) 37.5 - 51.0 % Final   07/30/2024 31.1 (L) 37.5 - 51.0 % Final   07/30/2024 35.4 (L) 37.5 - 51.0 % Final   07/30/2024 25.9 (L) 37.5 - 51.0 % Final   07/30/2024 29 (L) 38 - 51 % Final   07/30/2024 30 (L) 38 - 51 % Final   07/30/2024 29 (L) 38 - 51 % Final   07/30/2024 28 (L) 38 - 51 % Final   07/30/2024 28 (L) 38 - 51 % Final   07/30/2024 35 (L) 38 - 51 % Final      Platelets Platelets   Date Value Ref Range Status   08/02/2024 148 140 - 450 10*3/mm3 Final   08/01/2024 143 140 - 450 10*3/mm3 Final   07/31/2024 146 140 - 450 10*3/mm3 Final   07/30/2024 160 140 - 450 10*3/mm3 Final   07/30/2024 146 140 - 450 10*3/mm3 Final   07/30/2024 130 (L) 140 - 450 10*3/mm3 Final        PT/INR:    Protime   Date Value Ref Range Status   08/02/2024 16.7 (H) 11.7 - 14.2 Seconds Final   08/01/2024 16.4 (H) 11.7 - 14.2 Seconds Final   07/31/2024 15.9 (H) 11.7 - 14.2 Seconds Final   07/30/2024 16.6 (H) 11.7 - 14.2 Seconds Final   07/30/2024 28.1 (H) 11.7 - 14.2 Seconds Final   /  INR   Date Value Ref Range Status   08/02/2024 1.33 (H) 0.90 - 1.10 Final   08/01/2024 1.29 (H) 0.90 - 1.10 Final   07/31/2024 1.25 (H) 0.90 - 1.10 Final   07/30/2024 1.32 (H) 0.90 - 1.10 Final   07/30/2024 2.60 (H) 0.90 - 1.10 Final       Sodium Sodium   Date Value Ref Range Status   08/01/2024 134 (L) 136 - 145 mmol/L Final   07/31/2024 142 136 - 145 mmol/L Final   07/30/2024 143 136 - 145 mmol/L Final   07/30/2024 142 136 - 145 mmol/L Final      Potassium Potassium   Date Value Ref Range  Status   08/01/2024 4.2 3.5 - 5.2 mmol/L Final   07/31/2024 4.1 3.5 - 5.2 mmol/L Final   07/30/2024 4.2 3.5 - 5.2 mmol/L Final   07/30/2024 3.9 3.5 - 5.2 mmol/L Final     Comment:     Slight hemolysis detected by analyzer. Result may be falsely elevated.      Chloride Chloride   Date Value Ref Range Status   08/01/2024 102 98 - 107 mmol/L Final   07/31/2024 110 (H) 98 - 107 mmol/L Final   07/30/2024 113 (H) 98 - 107 mmol/L Final   07/30/2024 112 (H) 98 - 107 mmol/L Final      Bicarbonate CO2   Date Value Ref Range Status   08/01/2024 22.0 22.0 - 29.0 mmol/L Final   07/31/2024 17.7 (L) 22.0 - 29.0 mmol/L Final   07/30/2024 19.6 (L) 22.0 - 29.0 mmol/L Final   07/30/2024 21.9 (L) 22.0 - 29.0 mmol/L Final      BUN BUN   Date Value Ref Range Status   08/01/2024 30 (H) 8 - 23 mg/dL Final   07/31/2024 19 8 - 23 mg/dL Final   07/30/2024 17 8 - 23 mg/dL Final   07/30/2024 14 8 - 23 mg/dL Final      Creatinine Creatinine   Date Value Ref Range Status   08/01/2024 1.33 (H) 0.76 - 1.27 mg/dL Final   07/31/2024 1.07 0.76 - 1.27 mg/dL Final   07/30/2024 1.06 0.76 - 1.27 mg/dL Final   07/30/2024 1.07 0.76 - 1.27 mg/dL Final      Calcium Calcium   Date Value Ref Range Status   08/01/2024 9.3 8.6 - 10.5 mg/dL Final   07/31/2024 9.2 8.6 - 10.5 mg/dL Final   07/30/2024 8.7 8.6 - 10.5 mg/dL Final   07/30/2024 9.7 8.6 - 10.5 mg/dL Final      Magnesium Magnesium   Date Value Ref Range Status   07/31/2024 2.7 (H) 1.6 - 2.4 mg/dL Final   07/30/2024 2.8 (H) 1.6 - 2.4 mg/dL Final   07/30/2024 3.1 (H) 1.6 - 2.4 mg/dL Final          aspirin, 81 mg, Oral, Daily  atorvastatin, 40 mg, Oral, Nightly  chlorhexidine, 15 mL, Mouth/Throat, Q12H  enoxaparin, 40 mg, Subcutaneous, Daily  escitalopram, 5 mg, Oral, Daily  gabapentin, 100 mg, Oral, Q12H  guaiFENesin, 1,200 mg, Oral, Q12H  insulin lispro, 2-9 Units, Subcutaneous, 4x Daily AC & at Bedtime  metoprolol tartrate, 50 mg, Oral, Q12H  mupirocin, , Each Nare, BID  pantoprazole, 40 mg, Intravenous,  Once   Followed by  pantoprazole, 40 mg, Oral, QAM  senna-docusate sodium, 2 tablet, Oral, Nightly  tamsulosin, 0.4 mg, Oral, Daily  warfarin, 2 mg, Oral, Daily      sodium chloride, 30 mL/hr, Last Rate: 30 mL/hr (07/30/24 1319)              Coronary artery disease of native artery of native heart with stable angina pectoris    CAD (coronary artery disease), native coronary artery      Assessment & Plan    - coronary artery disease- s/pCABGx4 MVr, HEIDI- [place]  - mitral regurgitation  - HTN  - HLD  - cardiomyopathy, EF 26-35% on TTE  -LV thrombus  -post op anemia- expected acute blood loss     POD#3  Looks good this morning.  Had some confusion overnight-- will make sure he is not retaining urine and encourage sleep hygiene.  Discontinue gabapentin.  2L NC-- wean as able  Sinus rhythm rate in the 80s-- tolerating beta blocker  BMP pending.  Encourage pulmonary toilet   Mobilize/increase activity  Continue coumadin  for LV thrombus  Discontinue AV wires and chest tubes  Jeferson tube to bulb.  Continue routine care    DONITA Cruz  08/02/24  07:32 EDT

## 2024-08-02 NOTE — THERAPY TREATMENT NOTE
Patient Name: Sudhir Diaz  : 1955    MRN: [id number]                              Today's Date: 2024       Admit Date: 2024    Visit Dx:     ICD-10-CM ICD-9-CM   1. S/P CABG (coronary artery bypass graft)  Z95.1 V45.81   2. Coronary artery disease of native artery of native heart with stable angina pectoris  I25.118 414.01     413.9     Patient Active Problem List   Diagnosis    Shortness of breath    Essential hypertension    Chest discomfort    LBBB (left bundle branch block)    Abnormal nuclear stress test    Coronary artery disease of native artery of native heart with stable angina pectoris    Ischemic cardiomyopathy    Mitral incompetence    CAD (coronary artery disease), native coronary artery     Past Medical History:   Diagnosis Date    Anxiety     Arthritis     At risk for sleep apnea     5    Back pain     started 2024 in lower back happens sporadically  no noticable blood in urine  no c/o nausea/ vomiting    Cardiomyopathy     Coronary artery disease     Heart valve disease     mitral    Hyperlipidemia     Hypertension      Past Surgical History:   Procedure Laterality Date    CARDIAC CATHETERIZATION N/A 2024    Procedure: Left and Right Heart Cath with Coronary Angiography;  Surgeon: Peg Pettit MD;  Location: [facility] CATH INVASIVE LOCATION;  Service: Cardiovascular;  Laterality: N/A;    CARPAL TUNNEL RELEASE Bilateral     CHOLECYSTECTOMY      COLONOSCOPY      CORONARY ARTERY BYPASS GRAFT WITH MITRAL VALVE REPAIR/REPLACEMENT N/A 2024    Procedure: STERNOTOMY, CORONARY ARTERY BYPASS TIMES FOUR  WITH LEFT INTERNAL MAMMARY ARTERY GRAFT AND LEFT SAPHENOUS  ENDOSCOPIC HARVESTED VEIN GRAFT; MITRAL VALVE REPAIR; TRANSESOPHAGEAL ECHOCARDIOGRAM WITH ANESTHESIA, PRP.;  Surgeon: Jr Rubén Sanon MD;  Location: [facility];  Service: Cardiothoracic;  Laterality: N/A;    SHOULDER SURGERY Right       General Information       Row Name 24 1343          Physical  Therapy Time and Intention    Document Type therapy note (daily note)  -CS     Mode of Treatment individual therapy;physical therapy  -CS       Row Name 08/02/24 1343          General Information    Patient Profile Reviewed yes  -CS     Existing Precautions/Restrictions fall;cardiac  -CS       Row Name 08/02/24 1343          Cognition    Orientation Status (Cognition) oriented x 3  -CS       Row Name 08/02/24 1343          Safety Issues, Functional Mobility    Impairments Affecting Function (Mobility) balance;endurance/activity tolerance;pain;strength  -CS               User Key  (r) = Recorded By, (t) = Taken By, (c) = Cosigned By      Initials Name Provider Type    CS Willow Cason, PT Physical Therapist                   Mobility       Row Name 08/02/24 1343          Bed Mobility    Bed Mobility supine-sit  -CS     Supine-Sit [place] (Bed Mobility) standby assist  -CS     Assistive Device (Bed Mobility) head of bed elevated  -CS     Comment, (Bed Mobility) UIC at end of session  -CS       Row Name 08/02/24 1343          Sit-Stand Transfer    Sit-Stand [place] (Transfers) standby assist  -CS     Assistive Device (Sit-Stand Transfers) walker, front-wheeled  -CS       Row Name 08/02/24 1343          Gait/Stairs (Locomotion)    [place] Level (Gait) contact guard  -CS     Assistive Device (Gait) walker, front-wheeled  -CS     Distance in Feet (Gait) 200  -CS     Deviations/Abnormal Patterns (Gait) buddy decreased;gait speed decreased;stride length decreased  -CS     Bilateral Gait Deviations forward flexed posture  -CS     Comment, (Gait/Stairs) slow pace, no overt LOB  -CS               User Key  (r) = Recorded By, (t) = Taken By, (c) = Cosigned By      Initials Name Provider Type    CS Willow Cason, PT Physical Therapist                   Obj/Interventions       Row Name 08/02/24 1344          Motor Skills    Therapeutic Exercise other (see comments)  reviewed cardiac protocol  -       Row  Name 08/02/24 1342          Balance    Balance Assessment sitting static balance;sitting dynamic balance;standing static balance;standing dynamic balance  -     Static Sitting Balance supervision  -CS     Dynamic Sitting Balance standby assist  -CS     Position, Sitting Balance unsupported;sitting edge of bed  -CS     Static Standing Balance standby assist;contact guard  -CS     Dynamic Standing Balance contact guard  -CS     Position/Device Used, Standing Balance supported;walker, front-wheeled  -CS               User Key  (r) = Recorded By, (t) = Taken By, (c) = Cosigned By      Initials Name Provider Type    CS Willow Cason, PT Physical Therapist                   Goals/Plan    No documentation.                  Clinical Impression       Row Name 08/02/24 1344          Pain    Pretreatment Pain Rating 5/10  -CS     Posttreatment Pain Rating 5/10  -CS     Pain Location incisional  -CS     Pain Location - chest  -CS     Pain Intervention(s) Ambulation/increased activity;Rest;Repositioned  -       Row Name 08/02/24 1345          Plan of Care Review    Plan of Care Reviewed With patient  -CS     Progress improving  -CS     Outcome Evaluation Pt received in bed and agreeable to PT. Pt noted to be dizzy with staff earlier with BP reading 111/71 prior to activity. Pt performed bed mobility, STS txf, and ambulated 200' c RW requiring SBA/CGA. Pt demo's a slow pace but overall steady gait with use of AD. Pt denied any dizziness with activity. Pt UIC and cardiac protocol reviewed. PT will continue to follow to address functional deficits.  -       Row Name 08/02/24 1344          Therapy Assessment/Plan (PT)    Criteria for Skilled Interventions Met (PT) yes;meets criteria  -CS     Therapy Frequency (PT) 6 times/wk  -       Row Name 08/02/24 1344          Vital Signs    Pre Systolic BP Rehab 111  -CS     Pre Treatment Diastolic BP 71  -CS     Pre SpO2 (%) 95  -CS     O2 Delivery Pre Treatment supplemental O2  -CS      Intra SpO2 (%) 91  -CS     O2 Delivery Intra Treatment supplemental O2  -CS     Post SpO2 (%) 93  -CS     O2 Delivery Post Treatment supplemental O2  -CS       Row Name 08/02/24 1344          Positioning and Restraints    Pre-Treatment Position in bed  -CS     Post Treatment Position chair  -CS     In Chair reclined;call light within reach;encouraged to call for assist;exit alarm on;with family/caregiver;notified nsg  -CS               User Key  (r) = Recorded By, (t) = Taken By, (c) = Cosigned By      Initials Name Provider Type    Willow Dela Cruz, PT Physical Therapist                   Outcome Measures       Row Name 08/02/24 1346 08/02/24 1020       How much help from another person do you currently need...    Turning from your back to your side while in flat bed without using bedrails? 4  -CS 3  -PC    Moving from lying on back to sitting on the side of a flat bed without bedrails? 3  -CS 3  -PC    Moving to and from a bed to a chair (including a wheelchair)? 3  -CS 3  -PC    Standing up from a chair using your arms (e.g., wheelchair, bedside chair)? 3  -CS 3  -PC    Climbing 3-5 steps with a railing? 3  -CS 3  -PC    To walk in hospital room? 3  -CS 3  -PC    AM-PAC 6 Clicks Score (PT) 19  -CS 18  -PC    Highest Level of Mobility Goal 6 --> Walk 10 steps or more  -CS 6 --> Walk 10 steps or more  -PC      Row Name 08/02/24 1346          Functional Assessment    Outcome Measure Options AM-PAC 6 Clicks Basic Mobility (PT)  -CS               User Key  (r) = Recorded By, (t) = Taken By, (c) = Cosigned By      Initials Name Provider Type    PC Jose Scott, RN Registered Nurse    Willow Dela Cruz, PT Physical Therapist                                 Physical Therapy Education       Title: PT OT SLP Therapies (Done)       Topic: Physical Therapy (Done)       Point: Mobility training (Done)       Learning Progress Summary             Patient Acceptance, E,TB, VU,DU,NR by DAI at 8/2/2024 0642     Acceptance, E,TB,D, VU,NR by  at 8/1/2024 1624    Acceptance, E, NR by AR at 7/31/2024 1237                         Point: Home exercise program (Done)       Learning Progress Summary             Patient Acceptance, E,TB, VU,DU,NR by  at 8/2/2024 1346    Acceptance, E,TB,D, VU,NR by  at 8/1/2024 1624    Acceptance, E, NR by AR at 7/31/2024 1237                         Point: Body mechanics (Done)       Learning Progress Summary             Patient Acceptance, E,TB, VU,DU,NR by  at 8/2/2024 1346    Acceptance, E,TB,D, VU,NR by  at 8/1/2024 1624    Acceptance, E, NR by AR at 7/31/2024 1237                         Point: Precautions (Done)       Learning Progress Summary             Patient Acceptance, E,TB, VU,DU,NR by  at 8/2/2024 1346    Acceptance, E,TB,D, VU,NR by  at 8/1/2024 1624    Acceptance, E, NR by AR at 7/31/2024 1237                                         User Key       Initials Effective Dates Name Provider Type Discipline     06/16/21 -  Zulma Merino, PT Physical Therapist PT    AR 06/16/21 -  Sarah Knight PT Physical Therapist PT     09/22/22 -  Willow Cason PT Physical Therapist PT                  PT Recommendation and Plan     Plan of Care Reviewed With: patient  Progress: improving  Outcome Evaluation: Pt received in bed and agreeable to PT. Pt noted to be dizzy with staff earlier with BP reading 111/71 prior to activity. Pt performed bed mobility, STS txf, and ambulated 200' c RW requiring SBA/CGA. Pt demo's a slow pace but overall steady gait with use of AD. Pt denied any dizziness with activity. Pt UIC and cardiac protocol reviewed. PT will continue to follow to address functional deficits.     Time Calculation:         PT Charges       Row Name 08/02/24 1347             Time Calculation    Start Time 1310  -CS      Stop Time 1328  -      Time Calculation (min) 18 min  -CS      PT Received On 08/02/24  -      PT - Next Appointment 08/03/24  -         Time  Calculation- PT    Total Timed Code Minutes- PT 16 minute(s)  -CS         Timed Charges    36620 - PT Therapeutic Activity Minutes 16  -CS         Total Minutes    Timed Charges Total Minutes 16  -CS       Total Minutes 16  -CS                User Key  (r) = Recorded By, (t) = Taken By, (c) = Cosigned By      Initials Name Provider Type    CS Willow Cason, PT Physical Therapist                  Therapy Charges for Today       Code Description Service Date Service Provider Modifiers Qty    [id number]  PT THERAPEUTIC ACT EA 15 MIN 8/2/2024 Willow Cason, PT GP 1            PT G-Codes  Outcome Measure Options: AM-PAC 6 Clicks Basic Mobility (PT)  AM-PAC 6 Clicks Score (PT): 19  PT Discharge Summary  Anticipated Discharge Disposition (PT): home with assist    Willow Cason PT  8/2/2024

## 2024-08-02 NOTE — PLAN OF CARE
Goal Outcome Evaluation:  Plan of Care Reviewed With: patient        Progress: no change  Outcome Evaluation: Becomes increasingly confused at night, may need a sitter, CT, EPM, did not sleep much, SBA, VSS, continue plan of care.

## 2024-08-02 NOTE — PLAN OF CARE
Goal Outcome Evaluation:  Plan of Care Reviewed With: patient        Progress: improving  Outcome Evaluation: Pt received in bed and agreeable to PT. Pt noted to be dizzy with staff earlier with BP reading 111/71 prior to activity. Pt performed bed mobility, STS txf, and ambulated 200' c RW requiring SBA/CGA. Pt demo's a slow pace but overall steady gait with use of AD. Pt denied any dizziness with activity. Pt UIC and cardiac protocol reviewed. PT will continue to follow to address functional deficits.      Anticipated Discharge Disposition (PT): home with assist

## 2024-08-03 ENCOUNTER — APPOINTMENT (OUTPATIENT)
Dept: GENERAL RADIOLOGY | Facility: HOSPITAL | Age: 69
DRG: 220 | End: 2024-08-03
Payer: MEDICARE

## 2024-08-03 LAB
ANION GAP SERPL CALCULATED.3IONS-SCNC: 12 MMOL/L (ref 5–15)
BASE EXCESS BLDA CALC-SCNC: -1 MMOL/L (ref -5–5)
BUN SERPL-MCNC: 41 MG/DL (ref 8–23)
BUN/CREAT SERPL: 37.3 (ref 7–25)
CALCIUM SPEC-SCNC: 8.7 MG/DL (ref 8.6–10.5)
CHLORIDE SERPL-SCNC: 99 MMOL/L (ref 98–107)
CO2 BLDA-SCNC: 25 MMOL/L (ref 24–29)
CO2 SERPL-SCNC: 22 MMOL/L (ref 22–29)
CREAT SERPL-MCNC: 1.1 MG/DL (ref 0.76–1.27)
DEPRECATED RDW RBC AUTO: 44.2 FL (ref 37–54)
EGFRCR SERPLBLD CKD-EPI 2021: 71.8 ML/MIN/1.73
ERYTHROCYTE [DISTWIDTH] IN BLOOD BY AUTOMATED COUNT: 14.5 % (ref 12.3–15.4)
GLUCOSE BLDC GLUCOMTR-MCNC: 127 MG/DL (ref 70–130)
GLUCOSE BLDC GLUCOMTR-MCNC: 130 MG/DL (ref 70–130)
GLUCOSE BLDC GLUCOMTR-MCNC: 137 MG/DL (ref 70–130)
GLUCOSE BLDC GLUCOMTR-MCNC: 143 MG/DL (ref 70–130)
GLUCOSE BLDC GLUCOMTR-MCNC: 147 MG/DL (ref 70–130)
GLUCOSE SERPL-MCNC: 132 MG/DL (ref 65–99)
HCO3 BLDA-SCNC: 23.6 MMOL/L (ref 22–26)
HCT VFR BLD AUTO: 25.2 % (ref 37.5–51)
HCT VFR BLDA CALC: 28 % (ref 38–51)
HGB BLD-MCNC: 8.3 G/DL (ref 13–17.7)
HGB BLDA-MCNC: 9.5 G/DL (ref 12–17)
INR PPP: 1.55 (ref 0.9–1.1)
MCH RBC QN AUTO: 27.8 PG (ref 26.6–33)
MCHC RBC AUTO-ENTMCNC: 32.9 G/DL (ref 31.5–35.7)
MCV RBC AUTO: 84.3 FL (ref 79–97)
PCO2 BLDA: 38 MM HG (ref 35–45)
PH BLDA: 7.4 PH UNITS (ref 7.35–7.6)
PLATELET # BLD AUTO: 169 10*3/MM3 (ref 140–450)
PMV BLD AUTO: 10.1 FL (ref 6–12)
PO2 BLDA: 334 MMHG (ref 80–105)
POTASSIUM BLDA-SCNC: 4 MMOL/L (ref 3.5–4.9)
POTASSIUM SERPL-SCNC: 3.9 MMOL/L (ref 3.5–5.2)
PROTHROMBIN TIME: 18.8 SECONDS (ref 11.7–14.2)
RBC # BLD AUTO: 2.99 10*6/MM3 (ref 4.14–5.8)
SAO2 % BLDA: 100 % (ref 95–98)
SODIUM SERPL-SCNC: 133 MMOL/L (ref 136–145)
WBC NRBC COR # BLD AUTO: 9.72 10*3/MM3 (ref 3.4–10.8)

## 2024-08-03 PROCEDURE — 94799 UNLISTED PULMONARY SVC/PX: CPT

## 2024-08-03 PROCEDURE — 97110 THERAPEUTIC EXERCISES: CPT

## 2024-08-03 PROCEDURE — 85610 PROTHROMBIN TIME: CPT | Performed by: NURSE PRACTITIONER

## 2024-08-03 PROCEDURE — 71046 X-RAY EXAM CHEST 2 VIEWS: CPT

## 2024-08-03 PROCEDURE — 25010000002 ENOXAPARIN PER 10 MG: Performed by: NURSE PRACTITIONER

## 2024-08-03 PROCEDURE — 25010000002 CALCIUM GLUCONATE 2-0.675 GM/100ML-% SOLUTION: Performed by: NURSE PRACTITIONER

## 2024-08-03 PROCEDURE — 94761 N-INVAS EAR/PLS OXIMETRY MLT: CPT

## 2024-08-03 PROCEDURE — 80048 BASIC METABOLIC PNL TOTAL CA: CPT | Performed by: NURSE PRACTITIONER

## 2024-08-03 PROCEDURE — 85027 COMPLETE CBC AUTOMATED: CPT | Performed by: NURSE PRACTITIONER

## 2024-08-03 PROCEDURE — 84132 ASSAY OF SERUM POTASSIUM: CPT | Performed by: THORACIC SURGERY (CARDIOTHORACIC VASCULAR SURGERY)

## 2024-08-03 PROCEDURE — 82948 REAGENT STRIP/BLOOD GLUCOSE: CPT

## 2024-08-03 PROCEDURE — 94760 N-INVAS EAR/PLS OXIMETRY 1: CPT

## 2024-08-03 PROCEDURE — 94762 N-INVAS EAR/PLS OXIMTRY CONT: CPT

## 2024-08-03 RX ORDER — FUROSEMIDE 40 MG/1
40 TABLET ORAL DAILY
Status: DISCONTINUED | OUTPATIENT
Start: 2024-08-03 | End: 2024-08-04 | Stop reason: HOSPADM

## 2024-08-03 RX ORDER — METOPROLOL SUCCINATE 25 MG/1
25 TABLET, EXTENDED RELEASE ORAL
Status: DISCONTINUED | OUTPATIENT
Start: 2024-08-03 | End: 2024-08-03

## 2024-08-03 RX ORDER — WARFARIN SODIUM 4 MG/1
4 TABLET ORAL
Status: DISCONTINUED | OUTPATIENT
Start: 2024-08-03 | End: 2024-08-04 | Stop reason: HOSPADM

## 2024-08-03 RX ORDER — POTASSIUM CHLORIDE 750 MG/1
20 TABLET, FILM COATED, EXTENDED RELEASE ORAL ONCE
Status: COMPLETED | OUTPATIENT
Start: 2024-08-03 | End: 2024-08-03

## 2024-08-03 RX ORDER — CALCIUM GLUCONATE 20 MG/ML
2000 INJECTION, SOLUTION INTRAVENOUS ONCE
Status: COMPLETED | OUTPATIENT
Start: 2024-08-03 | End: 2024-08-03

## 2024-08-03 RX ORDER — METOPROLOL SUCCINATE 25 MG/1
25 TABLET, EXTENDED RELEASE ORAL EVERY 12 HOURS SCHEDULED
Status: DISCONTINUED | OUTPATIENT
Start: 2024-08-03 | End: 2024-08-04 | Stop reason: HOSPADM

## 2024-08-03 RX ORDER — POTASSIUM CHLORIDE 750 MG/1
20 TABLET, FILM COATED, EXTENDED RELEASE ORAL DAILY
Status: DISCONTINUED | OUTPATIENT
Start: 2024-08-03 | End: 2024-08-04 | Stop reason: HOSPADM

## 2024-08-03 RX ADMIN — 0.12% CHLORHEXIDINE GLUCONATE 15 ML: 1.2 RINSE ORAL at 08:52

## 2024-08-03 RX ADMIN — WARFARIN 4 MG: 4 TABLET ORAL at 17:46

## 2024-08-03 RX ADMIN — GUAIFENESIN 1200 MG: 600 TABLET, EXTENDED RELEASE ORAL at 22:12

## 2024-08-03 RX ADMIN — SENNOSIDES AND DOCUSATE SODIUM 2 TABLET: 50; 8.6 TABLET ORAL at 22:11

## 2024-08-03 RX ADMIN — ATORVASTATIN CALCIUM 40 MG: 20 TABLET, FILM COATED ORAL at 22:12

## 2024-08-03 RX ADMIN — POTASSIUM CHLORIDE 20 MEQ: 750 TABLET, EXTENDED RELEASE ORAL at 08:52

## 2024-08-03 RX ADMIN — PANTOPRAZOLE SODIUM 40 MG: 40 TABLET, DELAYED RELEASE ORAL at 06:34

## 2024-08-03 RX ADMIN — QUETIAPINE FUMARATE 12.5 MG: 25 TABLET ORAL at 00:25

## 2024-08-03 RX ADMIN — POTASSIUM CHLORIDE 20 MEQ: 750 TABLET, EXTENDED RELEASE ORAL at 15:23

## 2024-08-03 RX ADMIN — FUROSEMIDE 40 MG: 40 TABLET ORAL at 08:52

## 2024-08-03 RX ADMIN — 0.12% CHLORHEXIDINE GLUCONATE 15 ML: 1.2 RINSE ORAL at 22:12

## 2024-08-03 RX ADMIN — POTASSIUM CHLORIDE 20 MEQ: 750 TABLET, EXTENDED RELEASE ORAL at 06:34

## 2024-08-03 RX ADMIN — MUPIROCIN: 20 OINTMENT TOPICAL at 10:29

## 2024-08-03 RX ADMIN — GUAIFENESIN 1200 MG: 600 TABLET, EXTENDED RELEASE ORAL at 08:52

## 2024-08-03 RX ADMIN — ESCITALOPRAM 5 MG: 5 TABLET, FILM COATED ORAL at 08:52

## 2024-08-03 RX ADMIN — CALCIUM GLUCONATE 2000 MG: 20 INJECTION, SOLUTION INTRAVENOUS at 08:52

## 2024-08-03 RX ADMIN — METOPROLOL SUCCINATE 25 MG: 25 TABLET, EXTENDED RELEASE ORAL at 22:14

## 2024-08-03 RX ADMIN — MUPIROCIN 1 APPLICATION: 20 OINTMENT TOPICAL at 22:12

## 2024-08-03 RX ADMIN — ASPIRIN 81 MG: 81 TABLET, COATED ORAL at 08:53

## 2024-08-03 RX ADMIN — METOPROLOL SUCCINATE 25 MG: 25 TABLET, EXTENDED RELEASE ORAL at 08:52

## 2024-08-03 RX ADMIN — ENOXAPARIN SODIUM 40 MG: 100 INJECTION SUBCUTANEOUS at 08:52

## 2024-08-03 NOTE — THERAPY TREATMENT NOTE
Patient Name: Sudhir Diaz  : 1955    MRN: [id number]                              Today's Date: 8/3/2024       Admit Date: 2024    Visit Dx:     ICD-10-CM ICD-9-CM   1. S/P CABG (coronary artery bypass graft)  Z95.1 V45.81   2. Coronary artery disease of native artery of native heart with stable angina pectoris  I25.118 414.01     413.9     Patient Active Problem List   Diagnosis    Shortness of breath    Essential hypertension    Chest discomfort    LBBB (left bundle branch block)    Abnormal nuclear stress test    Coronary artery disease of native artery of native heart with stable angina pectoris    Ischemic cardiomyopathy    Mitral incompetence    CAD (coronary artery disease), native coronary artery     Past Medical History:   Diagnosis Date    Anxiety     Arthritis     At risk for sleep apnea     5    Back pain     started 2024 in lower back happens sporadically  no noticable blood in urine  no c/o nausea/ vomiting    Cardiomyopathy     Coronary artery disease     Heart valve disease     mitral    Hyperlipidemia     Hypertension      Past Surgical History:   Procedure Laterality Date    CARDIAC CATHETERIZATION N/A 2024    Procedure: Left and Right Heart Cath with Coronary Angiography;  Surgeon: Peg Pettit MD;  Location: [facility] CATH INVASIVE LOCATION;  Service: Cardiovascular;  Laterality: N/A;    CARPAL TUNNEL RELEASE Bilateral     CHOLECYSTECTOMY      COLONOSCOPY      CORONARY ARTERY BYPASS GRAFT WITH MITRAL VALVE REPAIR/REPLACEMENT N/A 2024    Procedure: STERNOTOMY, CORONARY ARTERY BYPASS TIMES FOUR  WITH LEFT INTERNAL MAMMARY ARTERY GRAFT AND LEFT SAPHENOUS  ENDOSCOPIC HARVESTED VEIN GRAFT; MITRAL VALVE REPAIR; TRANSESOPHAGEAL ECHOCARDIOGRAM WITH ANESTHESIA, PRP.;  Surgeon: Jr Rubén Sanon MD;  [place] [facility]  Service: Cardiothoracic;  Laterality: N/A;    SHOULDER SURGERY Right       General Information       Row Name 24 1208          Physical  Therapy Time and Intention    Document Type therapy note (daily note)  -PC     Mode of Treatment physical therapy  -PC       Row Name 08/03/24 1208          General Information    Existing Precautions/Restrictions cardiac;fall;sternal  -PC       Row Name 08/03/24 1208          Cognition    Orientation Status (Cognition) oriented x 4  -PC               User Key  (r) = Recorded By, (t) = Taken By, (c) = Cosigned By      Initials Name Provider Type    PC Sabrina Brown, PT Physical Therapist                   Mobility       Row Name 08/03/24 1209          Bed Mobility    Supine-Sit [place] (Bed Mobility) supervision  -PC     Sit-Supine [place] (Bed Mobility) supervision  -PC       Row Name 08/03/24 1209          Sit-Stand Transfer    Sit-Stand [place] (Transfers) supervision  -PC       Row Name 08/03/24 1209          Gait/Stairs (Locomotion)    [place] Level (Gait) contact guard;standby assist  -PC     Distance in Feet (Gait) 200  -PC     Comment, (Gait/Stairs) gait pattern normalizing, no assistive device needed  -PC               User Key  (r) = Recorded By, (t) = Taken By, (c) = Cosigned By      Initials Name Provider Type    PC Sabrina Brown, PT Physical Therapist                   Obj/Interventions       Row Name 08/03/24 1209          Balance    Static Standing Balance standby assist  -PC     Dynamic Standing Balance contact guard  -PC               User Key  (r) = Recorded By, (t) = Taken By, (c) = Cosigned By      Initials Name Provider Type    PC Sabrina Brown, PT Physical Therapist                   Goals/Plan    No documentation.                  Clinical Impression       Row Name 08/03/24 1210          Pain    Pretreatment Pain Rating 0/10 - no pain  -PC       Row Name 08/03/24 1210          Plan of Care Review    Plan of Care Reviewed With patient  -PC     Progress improving  -PC     Outcome Evaluation Pt showing steady progress with  mobility, able to walk 200 ft in [place] without  an assistive device  -PC       Row Name 08/03/24 1210          Positioning and Restraints    Pre-Treatment Position in bed  -PC     Post Treatment Position bed  -PC     In Bed supine;call light within reach;encouraged to call for assist;exit alarm on  -PC               User Key  (r) = Recorded By, (t) = Taken By, (c) = Cosigned By      Initials Name Provider Type    PC Sabrina Brown, PT Physical Therapist                   Outcome Measures       Row Name 08/03/24 1210 08/03/24 0812       How much help from another person do you currently need...    Turning from your back to your side while in flat bed without using bedrails? 4  -PC 4  -TH    Moving from lying on back to sitting on the side of a flat bed without bedrails? 4  -PC 4  -TH    Moving to and from a bed to a chair (including a wheelchair)? 4  -PC 4  -TH    Standing up from a chair using your arms (e.g., wheelchair, bedside chair)? 4  -PC 4  -TH    Climbing 3-5 steps with a railing? 3  -PC 3  -TH    To walk in hospital room? 3  -PC 3  -TH    AM-PAC 6 Clicks Score (PT) 22  -PC 22  -TH    Highest Level of Mobility Goal 7 --> Walk 25 feet or more  -PC 7 --> Walk 25 feet or more  -TH              User Key  (r) = Recorded By, (t) = Taken By, (c) = Cosigned By      Initials Name Provider Type    PC Sabrina Brown, PT Physical Therapist     Zoie Valero RN Registered Nurse                                 Physical Therapy Education       Title: PT OT SLP Therapies (Done)       Topic: Physical Therapy (Done)       Point: Mobility training (Done)       Learning Progress Summary             Patient Acceptance, E,D, DU by PC at 8/3/2024 1211    Acceptance, E,TB, VU,DU,NR by CS at 8/2/2024 1346    Acceptance, E,TB,D, VU,NR by  at 8/1/2024 1624    Acceptance, E, NR by AR at 7/31/2024 1237                         Point: Home exercise program (Done)       Learning Progress Summary             Patient Acceptance, E,D, DU by PC at 8/3/2024 1211    Acceptance,  E,TB, VU,DU,NR by  at 8/2/2024 1346    Acceptance, E,TB,D, VU,NR by  at 8/1/2024 1624    Acceptance, E, NR by AR at 7/31/2024 1237                         Point: Body mechanics (Done)       Learning Progress Summary             Patient Acceptance, E,D, DU by PC at 8/3/2024 1211    Acceptance, E,TB, VU,DU,NR by  at 8/2/2024 1346    Acceptance, E,TB,D, VU,NR by  at 8/1/2024 1624    Acceptance, E, NR by AR at 7/31/2024 1237                         Point: Precautions (Done)       Learning Progress Summary             Patient Acceptance, E,D, DU by  at 8/3/2024 1211    Acceptance, E,TB, VU,DU,NR by  at 8/2/2024 1346    Acceptance, E,TB,D, VU,NR by  at 8/1/2024 1624    Acceptance, E, NR by AR at 7/31/2024 1237                                         User Key       Initials Effective Dates Name Provider Type Discipline     06/16/21 -  Zulma Merino, PT Physical Therapist PT    PC 06/16/21 -  Sabrina Brown, PT Physical Therapist PT    AR 06/16/21 -  Sarah Knight, PT Physical Therapist PT    CS 09/22/22 -  Willow Cason, PT Physical Therapist PT                  PT Recommendation and Plan     Plan of Care Reviewed With: patient  Progress: improving  Outcome Evaluation: Pt showing steady progress with  mobility, able to walk 200 ft in [place] without an assistive device     Time Calculation:         PT Charges       Row Name 08/03/24 1211             Time Calculation    Start Time 1020  -PC      Stop Time 1028  -PC      Time Calculation (min) 8 min  -PC      PT Received On 08/03/24  -PC      PT - Next Appointment 08/04/24  -PC                User Key  (r) = Recorded By, (t) = Taken By, (c) = Cosigned By      Initials Name Provider Type    PC Sabrina Brown, PT Physical Therapist                  Therapy Charges for Today       Code Description Service Date Service Provider Modifiers Qty    14531006359 HC PT THER PROC EA 15 MIN 8/3/2024 Sabrina Brown, PT GP 1            PT G-Codes  Outcome Measure  Options: AM-PAC 6 Clicks Basic Mobility (PT)  AM-PAC 6 Clicks Score (PT): 22       Sabrina Brown, PT  8/3/2024

## 2024-08-03 NOTE — PLAN OF CARE
Goal Outcome Evaluation:  Plan of Care Reviewed With: patient        Progress: improving  Outcome Evaluation: Pt showing steady progress with  mobility, able to walk 200 ft in [place] without an assistive device

## 2024-08-03 NOTE — PLAN OF CARE
Goal Outcome Evaluation:           Progress: improving  Outcome Evaluation: 69yr old male AOx4 POD 4 from Cabg and MV replacement. VSS Hr 80's RA O2 sat 91% Tolerating increased activity. Pt denies any chest pain or soa. Family at bedside. No acute concerns plan of care ongoing

## 2024-08-03 NOTE — PROGRESS NOTES
LOS: 4 days   Patient Care Team:  Xavier Walden MD as PCP - General (Internal Medicine)  Peg Pettit MD as Consulting Physician (Cardiology)    Chief Complaint: post op     Subjective      Vital Signs  Temp:  [98.2 °F (36.8 °C)-98.7 °F (37.1 °C)] 98.7 °F (37.1 °C)  Heart Rate:  [77-94] 79  Resp:  [16-18] 16  BP: ()/(56-88) 106/57  Body mass index is 29.32 kg/m².    Intake/Output Summary (Last 24 hours) at 8/3/2024 0742  Last data filed at 8/2/2024 1741  Gross per 24 hour   Intake 360 ml   Output 500 ml   Net -140 ml     No intake/output data recorded.          08/01/24  0549 08/02/24  0600 08/03/24  0600   Weight: 85.5 kg (188 lb 8 oz) 85.2 kg (187 lb 13.3 oz) 84.9 kg (187 lb 2.7 oz)         Objective:  Vital signs: (most recent): Blood pressure 106/57, pulse 79, temperature 98.7 °F (37.1 °C), temperature source Oral, resp. rate 16, weight 84.9 kg (187 lb 2.7 oz), SpO2 92%.                Physical Exam:   General Appearance: awake and alert, no acute distress   Lungs: clear to auscultation, respirations regular, respirations even, and respirations unlabored   Heart: regular rhythm & normal rate, normal S1, S2, no murmur, no gallop, no rub, and no click   Abdomen: soft, hypoactive bowel sounds    Skin: sternal incision clean, dry, intact   Neuro: alert and oriented, no focal deficits.     Results Review:        WBC WBC   Date Value Ref Range Status   08/03/2024 9.72 3.40 - 10.80 10*3/mm3 Final   08/02/2024 11.11 (H) 3.40 - 10.80 10*3/mm3 Final   08/01/2024 11.85 (H) 3.40 - 10.80 10*3/mm3 Final      HGB Hemoglobin   Date Value Ref Range Status   08/03/2024 8.3 (L) 13.0 - 17.7 g/dL Final   08/02/2024 9.2 (L) 13.0 - 17.7 g/dL Final   08/01/2024 9.2 (L) 13.0 - 17.7 g/dL Final      HCT Hematocrit   Date Value Ref Range Status   08/03/2024 25.2 (L) 37.5 - 51.0 % Final   08/02/2024 28.0 (L) 37.5 - 51.0 % Final   08/01/2024 27.8 (L) 37.5 - 51.0 % Final      Platelets Platelets   Date Value Ref Range Status    08/03/2024 169 140 - 450 10*3/mm3 Final   08/02/2024 148 140 - 450 10*3/mm3 Final   08/01/2024 143 140 - 450 10*3/mm3 Final        PT/INR:    Protime   Date Value Ref Range Status   08/03/2024 18.8 (H) 11.7 - 14.2 Seconds Final   08/02/2024 16.7 (H) 11.7 - 14.2 Seconds Final   08/01/2024 16.4 (H) 11.7 - 14.2 Seconds Final   /  INR   Date Value Ref Range Status   08/03/2024 1.55 (H) 0.90 - 1.10 Final   08/02/2024 1.33 (H) 0.90 - 1.10 Final   08/01/2024 1.29 (H) 0.90 - 1.10 Final       Sodium Sodium   Date Value Ref Range Status   08/03/2024 133 (L) 136 - 145 mmol/L Final   08/02/2024 135 (L) 136 - 145 mmol/L Final   08/02/2024 135 (L) 136 - 145 mmol/L Final   08/01/2024 134 (L) 136 - 145 mmol/L Final      Potassium Potassium   Date Value Ref Range Status   08/03/2024 3.9 3.5 - 5.2 mmol/L Final   08/02/2024 4.4 3.5 - 5.2 mmol/L Final   08/02/2024 4.7 3.5 - 5.2 mmol/L Final   08/01/2024 4.2 3.5 - 5.2 mmol/L Final      Chloride Chloride   Date Value Ref Range Status   08/03/2024 99 98 - 107 mmol/L Final   08/02/2024 100 98 - 107 mmol/L Final   08/02/2024 101 98 - 107 mmol/L Final   08/01/2024 102 98 - 107 mmol/L Final      Bicarbonate CO2   Date Value Ref Range Status   08/03/2024 22.0 22.0 - 29.0 mmol/L Final   08/02/2024 21.0 (L) 22.0 - 29.0 mmol/L Final   08/02/2024 18.0 (L) 22.0 - 29.0 mmol/L Final   08/01/2024 22.0 22.0 - 29.0 mmol/L Final      BUN BUN   Date Value Ref Range Status   08/03/2024 41 (H) 8 - 23 mg/dL Final   08/02/2024 32 (H) 8 - 23 mg/dL Final   08/02/2024 30 (H) 8 - 23 mg/dL Final   08/01/2024 30 (H) 8 - 23 mg/dL Final      Creatinine Creatinine   Date Value Ref Range Status   08/03/2024 1.10 0.76 - 1.27 mg/dL Final   08/02/2024 1.08 0.76 - 1.27 mg/dL Final   08/02/2024 1.07 0.76 - 1.27 mg/dL Final   08/01/2024 1.33 (H) 0.76 - 1.27 mg/dL Final      Calcium Calcium   Date Value Ref Range Status   08/03/2024 8.7 8.6 - 10.5 mg/dL Final   08/02/2024 9.3 8.6 - 10.5 mg/dL Final   08/02/2024 9.6 8.6 -  "10.5 mg/dL Final   08/01/2024 9.3 8.6 - 10.5 mg/dL Final      Magnesium No results found for: \"MG\"         aspirin, 81 mg, Oral, Daily  atorvastatin, 40 mg, Oral, Nightly  chlorhexidine, 15 mL, Mouth/Throat, Q12H  enoxaparin, 40 mg, Subcutaneous, Daily  escitalopram, 5 mg, Oral, Daily  guaiFENesin, 1,200 mg, Oral, Q12H  insulin lispro, 2-9 Units, Subcutaneous, 4x Daily AC & at Bedtime  metoprolol tartrate, 50 mg, Oral, Q12H  mupirocin, , Each Nare, BID  pantoprazole, 40 mg, Intravenous, Once   Followed by  pantoprazole, 40 mg, Oral, QAM  senna-docusate sodium, 2 tablet, Oral, Nightly  warfarin, 2 mg, Oral, Daily      sodium chloride, 30 mL/hr, Last Rate: 30 mL/hr (07/30/24 1319)              Coronary artery disease of native artery of native heart with stable angina pectoris    CAD (coronary artery disease), native coronary artery      Assessment & Plan    - coronary artery disease- s/pCABGx4 MVr, HEIDI- [place]  - mitral regurgitation  - HTN  - HLD  - cardiomyopathy, EF 26-35% on TTE  -LV thrombus  -post op anemia- expected acute blood loss     POD#4  Looks good this morning.  Slept much better last night-- no further confusion/restlessness  Weaned to room air-- 2L NC overnight-- will check an overnight oximetry  Sinus rhythm rate in the 80s-- tolerating beta blocker- will switch to toprolol for GDMT  PO diurese  Encourage pulmonary toilet   Mobilize/increase activity  Continue coumadin  for LV thrombus  May be able to go home with home health tomorrow   Continue routine care    DONITA Cruz  08/03/24  07:42 EDT    "

## 2024-08-03 NOTE — PLAN OF CARE
Goal Outcome Evaluation:  Plan of Care Reviewed With: patient, daughter        Progress: no change  Outcome Evaluation: A&O, family at bedside, seroquel ordered and given, patient slept most of the night, possible dc planned for Monday, VSS, continue plan of care.

## 2024-08-04 ENCOUNTER — DOCUMENTATION (OUTPATIENT)
Dept: HOME HEALTH SERVICES | Facility: HOME HEALTHCARE | Age: 69
End: 2024-08-04
Payer: MEDICARE

## 2024-08-04 ENCOUNTER — HOME HEALTH ADMISSION (OUTPATIENT)
Dept: HOME HEALTH SERVICES | Facility: HOME HEALTHCARE | Age: 69
End: 2024-08-04
Payer: MEDICARE

## 2024-08-04 VITALS
BODY MASS INDEX: 27.89 KG/M2 | OXYGEN SATURATION: 100 % | RESPIRATION RATE: 18 BRPM | DIASTOLIC BLOOD PRESSURE: 72 MMHG | SYSTOLIC BLOOD PRESSURE: 115 MMHG | TEMPERATURE: 97.4 F | WEIGHT: 178.1 LBS | HEART RATE: 82 BPM

## 2024-08-04 PROBLEM — Z95.1 S/P CABG (CORONARY ARTERY BYPASS GRAFT): Status: ACTIVE | Noted: 2024-08-04

## 2024-08-04 LAB
ANION GAP SERPL CALCULATED.3IONS-SCNC: 9 MMOL/L (ref 5–15)
BUN SERPL-MCNC: 34 MG/DL (ref 8–23)
BUN/CREAT SERPL: 36.2 (ref 7–25)
CALCIUM SPEC-SCNC: 8.8 MG/DL (ref 8.6–10.5)
CHLORIDE SERPL-SCNC: 101 MMOL/L (ref 98–107)
CO2 SERPL-SCNC: 25 MMOL/L (ref 22–29)
CREAT SERPL-MCNC: 0.94 MG/DL (ref 0.76–1.27)
EGFRCR SERPLBLD CKD-EPI 2021: 86.7 ML/MIN/1.73
GLUCOSE SERPL-MCNC: 115 MG/DL (ref 65–99)
INR PPP: 1.77 (ref 0.9–1.1)
POTASSIUM SERPL-SCNC: 3.7 MMOL/L (ref 3.5–5.2)
PROTHROMBIN TIME: 20.9 SECONDS (ref 11.7–14.2)
SODIUM SERPL-SCNC: 135 MMOL/L (ref 136–145)

## 2024-08-04 PROCEDURE — 25010000002 ENOXAPARIN PER 10 MG: Performed by: NURSE PRACTITIONER

## 2024-08-04 PROCEDURE — 85610 PROTHROMBIN TIME: CPT | Performed by: NURSE PRACTITIONER

## 2024-08-04 PROCEDURE — 25010000002 MAGNESIUM SULFATE 2 GM/50ML SOLUTION: Performed by: NURSE PRACTITIONER

## 2024-08-04 PROCEDURE — 80048 BASIC METABOLIC PNL TOTAL CA: CPT | Performed by: NURSE PRACTITIONER

## 2024-08-04 PROCEDURE — 97110 THERAPEUTIC EXERCISES: CPT

## 2024-08-04 RX ORDER — METOPROLOL SUCCINATE 25 MG/1
25 TABLET, EXTENDED RELEASE ORAL EVERY 12 HOURS SCHEDULED
Qty: 60 TABLET | Refills: 2 | Status: SHIPPED | OUTPATIENT
Start: 2024-08-04 | End: 2024-11-02

## 2024-08-04 RX ORDER — MAGNESIUM SULFATE HEPTAHYDRATE 40 MG/ML
2 INJECTION, SOLUTION INTRAVENOUS ONCE
Status: COMPLETED | OUTPATIENT
Start: 2024-08-04 | End: 2024-08-04

## 2024-08-04 RX ORDER — WARFARIN SODIUM 2 MG/1
TABLET ORAL
Status: CANCELLED | OUTPATIENT
Start: 2024-08-04

## 2024-08-04 RX ORDER — HYDROCODONE BITARTRATE AND ACETAMINOPHEN 5; 325 MG/1; MG/1
1 TABLET ORAL EVERY 4 HOURS PRN
Qty: 30 TABLET | Refills: 0 | Status: SHIPPED | OUTPATIENT
Start: 2024-08-04 | End: 2024-08-11

## 2024-08-04 RX ORDER — FUROSEMIDE 40 MG/1
40 TABLET ORAL DAILY
Qty: 30 TABLET | Refills: 0 | Status: SHIPPED | OUTPATIENT
Start: 2024-08-05 | End: 2024-09-04

## 2024-08-04 RX ORDER — ATORVASTATIN CALCIUM 40 MG/1
40 TABLET, FILM COATED ORAL NIGHTLY
Qty: 90 TABLET | Refills: 3 | Status: SHIPPED | OUTPATIENT
Start: 2024-08-04 | End: 2025-07-30

## 2024-08-04 RX ORDER — POTASSIUM CHLORIDE 20 MEQ/1
20 TABLET, EXTENDED RELEASE ORAL DAILY
Qty: 30 TABLET | Refills: 0 | Status: SHIPPED | OUTPATIENT
Start: 2024-08-05 | End: 2024-09-04

## 2024-08-04 RX ORDER — POTASSIUM CHLORIDE 750 MG/1
20 TABLET, FILM COATED, EXTENDED RELEASE ORAL ONCE
Status: COMPLETED | OUTPATIENT
Start: 2024-08-04 | End: 2024-08-04

## 2024-08-04 RX ORDER — WARFARIN SODIUM 2 MG/1
TABLET ORAL
Qty: 60 TABLET | Refills: 2 | Status: SHIPPED | OUTPATIENT
Start: 2024-08-04

## 2024-08-04 RX ADMIN — PANTOPRAZOLE SODIUM 40 MG: 40 TABLET, DELAYED RELEASE ORAL at 05:10

## 2024-08-04 RX ADMIN — ENOXAPARIN SODIUM 40 MG: 100 INJECTION SUBCUTANEOUS at 08:20

## 2024-08-04 RX ADMIN — FUROSEMIDE 40 MG: 40 TABLET ORAL at 08:20

## 2024-08-04 RX ADMIN — METOPROLOL SUCCINATE 25 MG: 25 TABLET, EXTENDED RELEASE ORAL at 08:21

## 2024-08-04 RX ADMIN — ESCITALOPRAM 5 MG: 5 TABLET, FILM COATED ORAL at 08:20

## 2024-08-04 RX ADMIN — MAGNESIUM SULFATE HEPTAHYDRATE 2 G: 40 INJECTION, SOLUTION INTRAVENOUS at 10:33

## 2024-08-04 RX ADMIN — 0.12% CHLORHEXIDINE GLUCONATE 15 ML: 1.2 RINSE ORAL at 08:20

## 2024-08-04 RX ADMIN — MUPIROCIN 1 APPLICATION: 20 OINTMENT TOPICAL at 08:20

## 2024-08-04 RX ADMIN — ASPIRIN 81 MG: 81 TABLET, COATED ORAL at 08:20

## 2024-08-04 RX ADMIN — POTASSIUM CHLORIDE 20 MEQ: 750 TABLET, EXTENDED RELEASE ORAL at 08:21

## 2024-08-04 RX ADMIN — GUAIFENESIN 1200 MG: 600 TABLET, EXTENDED RELEASE ORAL at 08:20

## 2024-08-04 RX ADMIN — POTASSIUM CHLORIDE 20 MEQ: 750 TABLET, EXTENDED RELEASE ORAL at 08:20

## 2024-08-04 NOTE — THERAPY TREATMENT NOTE
Patient Name: Sudhir Diaz  : 1955    MRN: [id number]                              Today's Date: 2024       Admit Date: 2024    Visit Dx:     ICD-10-CM ICD-9-CM   1. S/P CABG (coronary artery bypass graft)  Z95.1 V45.81   2. Coronary artery disease of native artery of native heart with stable angina pectoris  I25.118 414.01     413.9   3. Abnormal coagulation profile  R79.1 790.92     Patient Active Problem List   Diagnosis    Shortness of breath    Essential hypertension    Chest discomfort    LBBB (left bundle branch block)    Abnormal nuclear stress test    Coronary artery disease of native artery of native heart with stable angina pectoris    Ischemic cardiomyopathy    Mitral incompetence    CAD (coronary artery disease), native coronary artery    S/P CABG (coronary artery bypass graft)     Past Medical History:   Diagnosis Date    Anxiety     Arthritis     At risk for sleep apnea     5    Back pain     started 2024 in lower back happens sporadically  no noticable blood in urine  no c/o nausea/ vomiting    Cardiomyopathy     Coronary artery disease     Heart valve disease     mitral    Hyperlipidemia     Hypertension      Past Surgical History:   Procedure Laterality Date    CARDIAC CATHETERIZATION N/A 2024    Procedure: Left and Right Heart Cath with Coronary Angiography;  Surgeon: Peg Pettit MD;  Location: [facility] CATH INVASIVE LOCATION;  Service: Cardiovascular;  Laterality: N/A;    CARPAL TUNNEL RELEASE Bilateral     CHOLECYSTECTOMY      COLONOSCOPY      CORONARY ARTERY BYPASS GRAFT WITH MITRAL VALVE REPAIR/REPLACEMENT N/A 2024    Procedure: STERNOTOMY, CORONARY ARTERY BYPASS TIMES FOUR  WITH LEFT INTERNAL MAMMARY ARTERY GRAFT AND LEFT SAPHENOUS  ENDOSCOPIC HARVESTED VEIN GRAFT; MITRAL VALVE REPAIR; TRANSESOPHAGEAL ECHOCARDIOGRAM WITH ANESTHESIA, PRP.;  Surgeon: Jr Rubén Sanon MD;  Location: [facility] CVOR;  Service: Cardiothoracic;  Laterality: N/A;    SHOULDER  SURGERY Right       General Information       Row Name 08/04/24 1150          Physical Therapy Time and Intention    Document Type therapy note (daily note)  -PC     Mode of Treatment physical therapy  -PC       Row Name 08/04/24 1150          General Information    Existing Precautions/Restrictions cardiac;fall;sternal  -PC       Row Name 08/04/24 1150          Cognition    Orientation Status (Cognition) oriented x 4  -PC               User Key  (r) = Recorded By, (t) = Taken By, (c) = Cosigned By      Initials Name Provider Type    PC Sabrina Brown, PT Physical Therapist                   Mobility       Row Name 08/04/24 1150          Bed Mobility    Supine-Sit [place] (Bed Mobility) independent  -PC     Sit-Supine [place] (Bed Mobility) independent  -PC       Row Name 08/04/24 1150          Sit-Stand Transfer    Sit-Stand [place] (Transfers) independent  -PC       Row Name 08/04/24 1150          Gait/Stairs (Locomotion)    [place] Level (Gait) supervision  -PC     Distance in Feet (Gait) 200  -PC     Comment, (Gait/Stairs) no unsteadiness noted today  -PC               User Key  (r) = Recorded By, (t) = Taken By, (c) = Cosigned By      Initials Name Provider Type    PC Sabrina Brown, PT Physical Therapist                   Obj/Interventions       Row Name 08/04/24 1150          Motor Skills    Therapeutic Exercise --  reviewed home walking program, signs and symptoms of overexertion, and energy conservation for home  -PC               User Key  (r) = Recorded By, (t) = Taken By, (c) = Cosigned By      Initials Name Provider Type    PC Sabrina Brown, PT Physical Therapist                   Goals/Plan    No documentation.                  Clinical Impression       Row Name 08/04/24 1151          Pain    Pretreatment Pain Rating 0/10 - no pain  -PC       [facility] Name 08/04/24 1151          Plan of Care Review    Plan of Care Reviewed With patient  -PC     Outcome Evaluation Pt with good  progress with mobility, independent with getting out of bed, and able to walk 200 ft independently, plans are to go home today, pt educated on home walking program  -PC       Row Name 08/04/24 1151          Positioning and Restraints    Pre-Treatment Position in bed  -PC     Post Treatment Position bed  -PC     In Bed supine;call light within reach;encouraged to call for assist  -PC               User Key  (r) = Recorded By, (t) = Taken By, (c) = Cosigned By      Initials Name Provider Type    PC Sabrina Brown, PT Physical Therapist                   Outcome Measures       Row Name 08/04/24 1155 08/04/24 0829       How much help from another person do you currently need...    Turning from your back to your side while in flat bed without using bedrails? 4  -PC 4  -TH    Moving from lying on back to sitting on the side of a flat bed without bedrails? 4  -PC 4  -TH    Moving to and from a bed to a chair (including a wheelchair)? 4  -PC 4  -TH    Standing up from a chair using your arms (e.g., wheelchair, bedside chair)? 4  -PC 4  -TH    Climbing 3-5 steps with a railing? 3  -PC 3  -TH    To walk in hospital room? 4  -PC 3  -TH    AM-PAC 6 Clicks Score (PT) 23  -PC 22  -TH    Highest Level of Mobility Goal 7 --> Walk 25 feet or more  -PC 7 --> Walk 25 feet or more  -TH              User Key  (r) = Recorded By, (t) = Taken By, (c) = Cosigned By      Initials Name Provider Type    PC Sabrina Brown, PT Physical Therapist     Zoie Valero, RN Registered Nurse                                 Physical Therapy Education       Title: PT OT SLP Therapies (Done)       Topic: Physical Therapy (Done)       Point: Mobility training (Done)       Learning Progress Summary             Patient Acceptance, E,D, DU by  at 8/4/2024 1155    Acceptance, E,TB, VU by  at 8/4/2024 0852    Acceptance, E,TB, VU by  at 8/3/2024 1803    Acceptance, E,D, DU by  at 8/3/2024 1211    Acceptance, E,TB, VU,DU,NR by  at 8/2/2024 1346     Acceptance, E,TB,D, VU,NR by  at 8/1/2024 1624    Acceptance, E, NR by AR at 7/31/2024 1237                         Point: Home exercise program (Done)       Learning Progress Summary             Patient Acceptance, E,D, DU by  at 8/4/2024 1155    Acceptance, E,TB, VU by TH at 8/4/2024 0852    Acceptance, E,TB, VU by TH at 8/3/2024 1803    Acceptance, E,D, DU by PC at 8/3/2024 1211    Acceptance, E,TB, VU,DU,NR by CS at 8/2/2024 1346    Acceptance, E,TB,D, VU,NR by  at 8/1/2024 1624    Acceptance, E, NR by AR at 7/31/2024 1237                         Point: Body mechanics (Done)       Learning Progress Summary             Patient Acceptance, E,D, DU by  at 8/4/2024 1155    Acceptance, E,TB, VU by TH at 8/4/2024 0852    Acceptance, E,TB, VU by  at 8/3/2024 1803    Acceptance, E,D, DU by  at 8/3/2024 1211    Acceptance, E,TB, VU,DU,NR by  at 8/2/2024 1346    Acceptance, E,TB,D, VU,NR by  at 8/1/2024 1624    Acceptance, E, NR by AR at 7/31/2024 1237                         Point: Precautions (Done)       Learning Progress Summary             Patient Acceptance, E,D, DU by  at 8/4/2024 1155    Acceptance, E,TB, VU by  at 8/4/2024 0852    Acceptance, E,TB, VU by  at 8/3/2024 1803    Acceptance, E,D, DU by  at 8/3/2024 1211    Acceptance, E,TB, VU,DU,NR by CS at 8/2/2024 1346    Acceptance, E,TB,D, VU,NR by  at 8/1/2024 1624    Acceptance, E, NR by AR at 7/31/2024 1237                                         User Key       Initials Effective Dates Name Provider Type Discipline     06/16/21 -  Zulma Merino, PT Physical Therapist PT     06/16/21 -  [place], Sabrina G, PT Physical Therapist PT    AR 06/16/21 -  Sarah Knight, PT Physical Therapist PT    TH 06/16/21 -  Zoie Valero, RN Registered Nurse Nurse     09/22/22 -  Willow Cason, PT Physical Therapist PT                  PT Recommendation and Plan     Plan of Care Reviewed With: patient  Progress: improving  Outcome  Evaluation: Pt with good progress with mobility, independent with getting out of bed, and able to walk 200 ft independently, plans are to go home today, pt educated on home walking program     Time Calculation:         PT Charges       Row Name 08/04/24 1156             Time Calculation    Start Time 1127  -PC      Stop Time 1136  -PC      Time Calculation (min) 9 min  -PC      PT Received On 08/04/24  -PC                User Key  (r) = Recorded By, (t) = Taken By, (c) = Cosigned By      Initials Name Provider Type    PC Sabrina Brown, PT Physical Therapist                  Therapy Charges for Today       Code Description Service Date Service Provider Modifiers Qty    63403999685  PT THER PROC EA 15 MIN 8/3/2024 Sabrina Brown, PT GP 1    [id number]  PT THER PROC EA 15 MIN 8/4/2024 Sabrina Brown, PT GP 1            PT G-Codes  Outcome Measure Options: AM-PAC 6 Clicks Basic Mobility (PT)  AM-PAC 6 Clicks Score (PT): 23  PT Discharge Summary  Anticipated Discharge Disposition (PT): home with assist    Sabrina Brown, ROGER  8/4/2024

## 2024-08-04 NOTE — PLAN OF CARE
Goal Outcome Evaluation:  Plan of Care Reviewed With: patient         Pt struggled with using the urinal.  He kept spilling it.  Pt appeared to sleep deep however states he didn't get much rest RN requested sleep mediation but never did get medication.

## 2024-08-04 NOTE — PROGRESS NOTES
[facility] will follow post hospital as requested. Patient/daughter agreeable to services. Contact information confirmed. Best for home health to call daughter, Cassandra, at #[phone] to schedule the home visits.

## 2024-08-04 NOTE — PLAN OF CARE
Goal Outcome Evaluation:  Plan of Care Reviewed With: patient        Progress: improving  Outcome Evaluation: Pt with good progress with mobility, independent with getting out of bed, and able to walk 200 ft independently, plans are to go home today, pt educated on home walking program      Anticipated Discharge Disposition (PT): home with assist

## 2024-08-04 NOTE — DISCHARGE SUMMARY
Date of Admission:   Date of Discharge:  8/4/2024    Discharge Diagnosis:   - coronary artery disease- s/pCABGx4 MVr, HEIDI- [place]  - mitral regurgitation  - HTN  - HLD  - cardiomyopathy, EF 26-35% on TTE  -LV thrombus  -post op anemia- expected acute blood loss     Presenting Problem/History of Present Illness  Coronary artery disease of native artery of native heart with stable angina pectoris [I25.118]  CAD (coronary artery disease), native coronary artery [I25.10]     Hospital Course  Patient is a 69 y.o. male presented for scheduled cardiac surgery.  On 7/30 he underwent CABG x 4.  Skeletonized LIMA to LAD.  Vein graft to PDA.  Vein graft to LV branch 1.  T graft BAL graft to LV branch 2.  Mitral valve repair with a 28 mm Medtronic 3D ring.  Closure of left atrial appendage internally.  Fiber tape Arthrex closure of the sternum.  Endoscopic vein harvest with theiSnapa system.  Temporary cardiopulmonary bypass.  Antegrade and retrograde blood cardioplegia with warm reperfusion.  Neurologic monitoring.  Transesophageal echo by anesthesia by Dr. Sanon (see op report for full details).  Post operatively he did well.  Weaned from ventilator on day of surgery. Weaned from inotropic support. There was an LV thrombus noted intraoperatively, he will be anticoagulated on coumadin.  All lines tubes and wires removed without issue.  Weaned from supplemental oxygen.  Met PT goals.  Tolerated medication therapy.  His blood pressure would not tolerate his entresto at this time, will reevaluate as an outpatient.  Adequate PO intake.  Urinating and defecating normally.  On POD 4 he was deemed ready for discharge home with home health. He will be sent home with 2mg tablets of coumadin for easy dosing.  He will take 4mg tonight and home health is to draw an INR 8/5 and call to our office.  He will need to get established with anticoagulation clinic, a referral has been placed.  Patient was provided with appropriate discharge  education. He was instructed to call office with any questions or concerns.      Procedures Performed  Procedure(s):  STERNOTOMY, CORONARY ARTERY BYPASS TIMES FOUR  WITH LEFT INTERNAL MAMMARY ARTERY GRAFT AND LEFT SAPHENOUS  ENDOSCOPIC HARVESTED VEIN GRAFT; MITRAL VALVE REPAIR; TRANSESOPHAGEAL ECHOCARDIOGRAM WITH ANESTHESIA, PRP.       Consults:   Consults       No orders found from 7/1/2024 to 7/31/2024.            Pertinent Test Results:    Lab Results   Component Value Date    WBC 9.72 08/03/2024    HGB 8.3 (L) 08/03/2024    HCT 25.2 (L) 08/03/2024    MCV 84.3 08/03/2024     08/03/2024      Lab Results   Component Value Date    GLUCOSE 115 (H) 08/04/2024    CALCIUM 8.8 08/04/2024     (L) 08/04/2024    K 3.7 08/04/2024    CO2 25.0 08/04/2024     08/04/2024    BUN 34 (H) 08/04/2024    CREATININE 0.94 08/04/2024    EGFRIFNONA 74 08/01/2020    BCR 36.2 (H) 08/04/2024    ANIONGAP 9.0 08/04/2024     Lab Results   Component Value Date    INR 1.77 (H) 08/04/2024    PROTIME 20.9 (H) 08/04/2024         Condition on Discharge:  good    Vital Signs  Temp:  [97.6 °F (36.4 °C)-99.3 °F (37.4 °C)] 97.6 °F (36.4 °C)  Heart Rate:  [83-86] 83  Resp:  [16-18] 18  BP: (111-138)/(60-71) 111/60      Discharge Disposition  Home-Health Care [facility] [place]    Discharge Medications     Discharge Medications        New Medications        Instructions Start Date   furosemide 40 MG tablet  Commonly known as: LASIX   40 mg, Oral, Daily   Start Date: August 5, 2024     HYDROcodone-acetaminophen 5-325 MG per tablet  Commonly known as: NORCO   1 tablet, Oral, Every 4 Hours PRN      metoprolol succinate XL 25 MG 24 hr tablet  Commonly known as: TOPROL-XL   25 mg, Oral, Every 12 Hours Scheduled      potassium chloride 20 MEQ CR tablet  Commonly known as: KLOR-CON M20   20 mEq, Oral, Daily   Start Date: August 5, 2024     warfarin 2 MG tablet  Commonly known as: COUMADIN   4mg daily titrate to INR.             Changes to Medications         Instructions Start Date   atorvastatin 40 MG tablet  Commonly known as: LIPITOR  What changed:   medication strength  how much to take  when to take this   40 mg, Oral, Nightly             Continue These Medications        Instructions Start Date   Aspirin Low Dose 81 MG EC tablet  Generic drug: aspirin   81 mg, Oral, Daily      escitalopram 5 MG tablet  Commonly known as: LEXAPRO   5 mg, Oral, Daily             Stop These Medications      carvedilol 3.125 MG tablet  Commonly known as: COREG     Entresto 24-26 MG tablet  Generic drug: sacubitril-valsartan     ibuprofen 200 MG tablet  Commonly known as: ADVIL,MOTRIN     isosorbide mononitrate 60 MG 24 hr tablet  Commonly known as: IMDUR              Discharge Diet:     Activity at Discharge:   1. No driving for 2 weeks and off narcotic pain medications.  2. Shower daily. Clean incisions with warm water and antibacterial soap only. Do not put any lotion or ointments on incisions.  3. Ambulate for 10 minutes at least 3 times a day.  4. No heavy lifting > 10lbs until seen in office.   5. Take all medications as prescribed.       Follow-up Appointments  No future appointments.  Additional Instructions for the Follow-ups that You Need to Schedule       Ambulatory Referral to Cardiac Rehab   As directed      Ambulatory Referral to Home Health   As directed      Please draw INR on Monday 8/5 and call results to [phone]    Order Comments: Please draw INR on Monday 8/5 and call results to [phone]    Face to Face Visit Date: 8/4/2024   Follow-up provider for Plan of Care?: I will be treating the patient on an ongoing basis.  Please send me the Plan of Care for signature.   Follow-up provider: JR JESUS WHITAKER [[id number]]   Reason/Clinical Findings: post op open heart   Describe mobility limitations that make leaving home difficult: weakness   Nursing/Therapeutic Services Requested: Physical Therapy Skilled Nursing   Skilled nursing orders: Post CABG care   Frequency:  "1 Week 1        Call MD With Problems / Concerns   As directed      Instructions:  Call office at [phone] for any drainage, increased redness, or fever over 100.5    Order Comments: Instructions:  Call office at [phone] for any drainage, increased redness, or fever over 100.5         Discharge Follow-up with PCP   As directed       Currently Documented PCP:    Xavier Walden MD    PCP Phone Number:    [phone]     Follow Up Details: in 1 week        Discharge Follow-up with Specialty: Cardiologist APRN/PA; 1 Week   As directed      Specialty: Cardiologist APRN/PA   Follow Up: 1 Week   Follow Up Details: bring all prescription bottles to appointment, call for appointment        Discharge Follow-up with Specified Provider: Cardiologist; 1 Month   As directed      To: Cardiologist   Follow Up: 1 Month   Follow Up Details: call for appointment, bring all medication bottles to appointment        Discharge Follow-up with Specified Provider: Dr. Sanon; 1 Month   As directed      To: Dr. Sanon   Follow Up: 1 Month   Follow Up Details: 4-6 weeks, bring all current medications to appointment        Referral to Anticoagulation Monitoring   As directed       Select To DEPT SPEC to filter TO DEPT       Send INR reminders to the \"clinical pool\" of the TO DEPT     (ie:   [facility]  or [facility] POOL)     Protime-INR    Aug 09, 2024 (Approximate)      Release to patient: Routine Release                Test Results Pending at Discharge       DONITA Cruz  08/04/24  09:01 EDT          "

## 2024-08-04 NOTE — PROGRESS NOTES
LOS: 5 days   Patient Care Team:  Xavier Walden MD as PCP - General (Internal Medicine)  Peg Pettit MD as Consulting Physician (Cardiology)    Chief Complaint: post op     Subjective      Vital Signs  Temp:  [97.6 °F (36.4 °C)-99.3 °F (37.4 °C)] 97.6 °F (36.4 °C)  Heart Rate:  [83-86] 83  Resp:  [16-18] 18  BP: (111-138)/(60-71) 111/60  Body mass index is 27.89 kg/m².    Intake/Output Summary (Last 24 hours) at 8/4/2024 0817  Last data filed at 8/3/2024 1829  Gross per 24 hour   Intake 660 ml   Output --   Net 660 ml     No intake/output data recorded.          08/02/24  0600 08/03/24  0600 08/04/24  0509   Weight: 85.2 kg (187 lb 13.3 oz) 84.9 kg (187 lb 2.7 oz) 80.8 kg (178 lb 1.6 oz)         Objective:  Vital signs: (most recent): Blood pressure 111/60, pulse 83, temperature 97.6 °F (36.4 °C), temperature source Oral, resp. rate 18, weight 80.8 kg (178 lb 1.6 oz), SpO2 100%.                Physical Exam:   General Appearance: awake and alert, no acute distress   Lungs: clear to auscultation, respirations regular, respirations even, and respirations unlabored   Heart: regular rhythm & normal rate, normal S1, S2, no murmur, no gallop, no rub, and no click   Abdomen: soft, hypoactive bowel sounds    Skin: sternal incision clean, dry, intact   Neuro: alert and oriented, no focal deficits.     Results Review:        WBC WBC   Date Value Ref Range Status   08/03/2024 9.72 3.40 - 10.80 10*3/mm3 Final   08/02/2024 11.11 (H) 3.40 - 10.80 10*3/mm3 Final      HGB Hemoglobin   Date Value Ref Range Status   08/03/2024 8.3 (L) 13.0 - 17.7 g/dL Final   08/02/2024 9.2 (L) 13.0 - 17.7 g/dL Final      HCT Hematocrit   Date Value Ref Range Status   08/03/2024 25.2 (L) 37.5 - 51.0 % Final   08/02/2024 28.0 (L) 37.5 - 51.0 % Final      Platelets Platelets   Date Value Ref Range Status   08/03/2024 169 140 - 450 10*3/mm3 Final   08/02/2024 148 140 - 450 10*3/mm3 Final        PT/INR:    Protime   Date Value Ref Range Status  "  08/04/2024 20.9 (H) 11.7 - 14.2 Seconds Final   08/03/2024 18.8 (H) 11.7 - 14.2 Seconds Final   08/02/2024 16.7 (H) 11.7 - 14.2 Seconds Final   /  INR   Date Value Ref Range Status   08/04/2024 1.77 (H) 0.90 - 1.10 Final   08/03/2024 1.55 (H) 0.90 - 1.10 Final   08/02/2024 1.33 (H) 0.90 - 1.10 Final       Sodium Sodium   Date Value Ref Range Status   08/04/2024 135 (L) 136 - 145 mmol/L Final   08/03/2024 133 (L) 136 - 145 mmol/L Final   08/02/2024 135 (L) 136 - 145 mmol/L Final   08/02/2024 135 (L) 136 - 145 mmol/L Final      Potassium Potassium   Date Value Ref Range Status   08/04/2024 3.7 3.5 - 5.2 mmol/L Final   08/03/2024 3.9 3.5 - 5.2 mmol/L Final   08/03/2024 3.9 3.5 - 5.2 mmol/L Final   08/03/2024 3.9 3.5 - 5.2 mmol/L Final   08/02/2024 4.4 3.5 - 5.2 mmol/L Final   08/02/2024 4.7 3.5 - 5.2 mmol/L Final      Chloride Chloride   Date Value Ref Range Status   08/04/2024 101 98 - 107 mmol/L Final   08/03/2024 99 98 - 107 mmol/L Final   08/02/2024 100 98 - 107 mmol/L Final   08/02/2024 101 98 - 107 mmol/L Final      Bicarbonate CO2   Date Value Ref Range Status   08/04/2024 25.0 22.0 - 29.0 mmol/L Final   08/03/2024 22.0 22.0 - 29.0 mmol/L Final   08/02/2024 21.0 (L) 22.0 - 29.0 mmol/L Final   08/02/2024 18.0 (L) 22.0 - 29.0 mmol/L Final      BUN BUN   Date Value Ref Range Status   08/04/2024 34 (H) 8 - 23 mg/dL Final   08/03/2024 41 (H) 8 - 23 mg/dL Final   08/02/2024 32 (H) 8 - 23 mg/dL Final   08/02/2024 30 (H) 8 - 23 mg/dL Final      Creatinine Creatinine   Date Value Ref Range Status   08/04/2024 0.94 0.76 - 1.27 mg/dL Final   08/03/2024 1.10 0.76 - 1.27 mg/dL Final   08/02/2024 1.08 0.76 - 1.27 mg/dL Final   08/02/2024 1.07 0.76 - 1.27 mg/dL Final      Calcium Calcium   Date Value Ref Range Status   08/04/2024 8.8 8.6 - 10.5 mg/dL Final   08/03/2024 8.7 8.6 - 10.5 mg/dL Final   08/02/2024 9.3 8.6 - 10.5 mg/dL Final   08/02/2024 9.6 8.6 - 10.5 mg/dL Final      Magnesium No results found for: \"MG\"   "       aspirin, 81 mg, Oral, Daily  atorvastatin, 40 mg, Oral, Nightly  chlorhexidine, 15 mL, Mouth/Throat, Q12H  enoxaparin, 40 mg, Subcutaneous, Daily  escitalopram, 5 mg, Oral, Daily  furosemide, 40 mg, Oral, Daily  guaiFENesin, 1,200 mg, Oral, Q12H  metoprolol succinate XL, 25 mg, Oral, Q12H  mupirocin, , Each Nare, BID  pantoprazole, 40 mg, Intravenous, Once   Followed by  pantoprazole, 40 mg, Oral, QAM  potassium chloride, 20 mEq, Oral, Daily  potassium chloride ER, 20 mEq, Oral, Once  senna-docusate sodium, 2 tablet, Oral, Nightly  warfarin, 4 mg, Oral, Daily      sodium chloride, 30 mL/hr, Last Rate: 30 mL/hr (07/30/24 1319)              Coronary artery disease of native artery of native heart with stable angina pectoris    CAD (coronary artery disease), native coronary artery      Assessment & Plan    - coronary artery disease- s/pCABGx4 MVr, HEIDI- [place]  - mitral regurgitation  - HTN  - HLD  - cardiomyopathy, EF 26-35% on TTE  -LV thrombus  -post op anemia- expected acute blood loss     POD#4  Looks good this morning.  Slept much better last night-- no further confusion/restlessness  Weaned to room air-- tolerating well  Sinus rhythm rate in the 80s-- tolerating beta blocker- will switch to toprolol for GDMT-- not ready for entreso yet-- will re-evaluate as an outpatient with his cardiologist Dr. Pettit.  PO esmer  Encourage pulmonary toilet   Mobilize/increase activity  Continue coumadin  for LV thrombus-- will send him home with 2mg tablets, he is to take 4mg tonight and will have home health draw an INR tomorrow and call to our office for instruction.  Goal INR 2-3  Okay for discharge home with home health today.  Continue routine care    DONITA Cruz  08/04/24  08:17 EDT

## 2024-08-05 ENCOUNTER — ANTICOAGULATION VISIT (OUTPATIENT)
Dept: PHARMACY | Facility: HOSPITAL | Age: 69
End: 2024-08-05
Payer: MEDICARE

## 2024-08-05 ENCOUNTER — HOME CARE VISIT (OUTPATIENT)
Dept: HOME HEALTH SERVICES | Facility: HOME HEALTHCARE | Age: 69
End: 2024-08-05
Payer: MEDICARE

## 2024-08-05 ENCOUNTER — READMISSION MANAGEMENT (OUTPATIENT)
Dept: CALL CENTER | Facility: HOSPITAL | Age: 69
End: 2024-08-05
Payer: MEDICARE

## 2024-08-05 VITALS
OXYGEN SATURATION: 94 % | RESPIRATION RATE: 17 BRPM | SYSTOLIC BLOOD PRESSURE: 114 MMHG | HEART RATE: 86 BPM | DIASTOLIC BLOOD PRESSURE: 64 MMHG | TEMPERATURE: 98.6 F

## 2024-08-05 DIAGNOSIS — Z95.1 S/P CABG (CORONARY ARTERY BYPASS GRAFT): Primary | ICD-10-CM

## 2024-08-05 DIAGNOSIS — I51.3 LEFT VENTRICULAR THROMBUS: ICD-10-CM

## 2024-08-05 LAB
BH BB BLOOD EXPIRATION DATE: NORMAL
BH BB BLOOD EXPIRATION DATE: NORMAL
BH BB BLOOD TYPE BARCODE: 5100
BH BB BLOOD TYPE BARCODE: 5100
BH BB DISPENSE STATUS: NORMAL
BH BB DISPENSE STATUS: NORMAL
BH BB PRODUCT CODE: NORMAL
BH BB PRODUCT CODE: NORMAL
BH BB UNIT NUMBER: NORMAL
BH BB UNIT NUMBER: NORMAL
CROSSMATCH INTERPRETATION: NORMAL
CROSSMATCH INTERPRETATION: NORMAL
HH POC INTERNATIONAL NORMALIZATION RATIO: 3.4
HH POC PROTIME: 33.3 SECONDS
INR PPP: 3.4 (ref 2–3)
UNIT  ABO: NORMAL
UNIT  ABO: NORMAL
UNIT  RH: NORMAL
UNIT  RH: NORMAL

## 2024-08-05 PROCEDURE — G0299 HHS/HOSPICE OF RN EA 15 MIN: HCPCS

## 2024-08-05 NOTE — Clinical Note
Informational Purposes Only:  As per home health protocol MD must be notified of any MAJOR drug interactions found upon medication reivew. Please advise if any changes should be made to pts medications.    Drug-Drug: Aspirin Low Dose and warfarin   The risk of bleeding, particularly gastrointestinal, may be increased by coadministration of warfarin with Aspirin Low Dose. However, use of low-dose aspirin with warfarin may provide benefit that outweighs the risk of minor bleeding.    Thank you,   [facility]   phone [phone]  fax [phone]

## 2024-08-05 NOTE — HOME HEALTH
"SOC Note: Pt reports he had started noticing he was getting more SOA and a chest tightness for a few months prior to his surgery. Pt was referrred to a cardiologist and was ultimately determined to need open heart surgery. Pts surgery went well overall and pt reports his pain has been minimal overall. Pt was started on coumadin due to his valve and referred [facility] for management. Pt was DC back home on 8.4.24. Pt lives alone but has 3 supportive daughters who live close by and plan to stay with pt for a while and check in regularly after that. Pt reports he is ambulating okay but has noticed his balance is not great. Pt agreeable with PT cordell at this time. INR checked and results called to [facility] at this time.     Home Health ordered for: [facility] 2w3, 1w5, PT eval, [facility] set up    Reason for Hosp/Primary Dx/Co-morbidities: CABG, CAD, HLD, HTN    Focus of Care: CABG     Patient's goal(s): \"to try to stay healthy\"    Current Functional status/mobility/DME: ambulating without assistive device, assist times one needed for supervision    HB status/Living Arrangements: lives alone in 2 story home, 3 daughters live close by and check on pt regularly    Skin Integrity/wound status: incisions to chest and BLE    Code Status: full    Fall Risk/Safety concerns: high    Educated on Emergency Plan, steps to take prior to going to the ER and when to Call Home Health First:  reviewed     Medication issues/Concerns: all meds in the home, pts daughters help manage    SDOH Barriers (i.e. caregiver concerns, social isolation, transportation, food insecurity, environment, income etc.)/Need for MSW: not at this time"

## 2024-08-05 NOTE — Clinical Note
"SOC Note: Pt reports he had started noticing he was getting more SOA and a chest tightness for a few months prior to his surgery. Pt was referrred to a cardiologist and was ultimately determined to need open heart surgery. Pts surgery went well overall and pt reports his pain has been minimal overall. Pt was started on coumadin due to his valve and referred [facility]. Pt was DC back home on 8.4.24. Pt lives alone but has 3 supportive daughters who live close by and plan to stay with pt for a while and check in regularly after that. Pt reports he is ambulating okay but has noticed his balance is not great. Pt agreeable with PT cordell at this time. INR checked and results called to [facility] at this time.     Home Health ordered for: [facility] 2w3, 1w5, PT eval, [facility] set up    Reason for Hosp/Primary Dx/Co-morbidities: CABG, CAD, HLD, HTN    Focus of Care: CABG     Patient's goal(s): \"to try to stay healthy\"    Current Functional status/mobility/DME: ambulating without assistive device, assist times one needed for supervision    HB status/Living Arrangements: lives alone in 2 story home, 3 daughters live close by and check on pt regularly    Skin Integrity/wound status: incisions to chest and BLE    Code Status: full    Fall Risk/Safety concerns: high    Educated on Emergency Plan, steps to take prior to going to the ER and when to Call Home Health First:  reviewed     Medication issues/Concerns: all meds in the home, pts daughters help manage    SDOH Barriers (i.e. caregiver concerns, social isolation, transportation, food insecurity, environment, income etc.)/Need for MSW: not at this time  "

## 2024-08-05 NOTE — PROGRESS NOTES
Anticoagulation Clinic Initial Visit Progress Note- conducted via telephone call with [facility] RN Yudith.     Today was Sudhir Diaz's first visit to the Anticoagulation Clinic following referral by DONITA Parisi with [facility] cardiovascular surgery regarding  warfarin management s/p CABG x4, MVr, and HEIDI 7/30 and patient with LV thrombus  .    Warfarin Initiation Date: 8/1    Planned Duration of Therapy:  At this time, unknown if pt will require indefinite anticoagulation.     INR Goal: 2-3    Today's INR: 3.4 (supratherapeutic)    Current warfarin dose:  Pt initiated on warfarin 8/1 and dosing since then as follows 8/1: 2 mg, 8/2: 2 mg, 8/3: 4 mg, 8/4: 4 mg. INR 8/4 in hospital prior to discharge resulted at 1.77. Today INR checked by [facility] and called into clinic resulted at 3.4 ( RN verifies that she checked this twice).      Clinic Interview:   Patient Findings     Positives:  Hospital admission    Negatives:  Signs/symptoms of thrombosis, Signs/symptoms of bleeding,   Laboratory test error suspected, Change in health, Change in alcohol use,   Change in activity, Upcoming invasive procedure, Emergency department   visit, Upcoming dental procedure, Missed doses, Extra doses, Change in   medications, Change in diet/appetite, Bruising, Other complaints    Comments:  Pt s/p CABG x4, MVr, HEIDI on 7/30. Noted to also have LV   thrombus. Discharged from hospital to home with home health 8/4.       Clinical Outcomes     Negatives:  Major bleeding event, Thromboembolic event,   Anticoagulation-related hospital admission, Anticoagulation-related ED   visit, Anticoagulation-related fatality    Comments:  Pt s/p CABG x4, MVr, HEIDI on 7/30. Noted to also have LV   thrombus. Discharged from hospital to home with home health 8/4.         INR History:      Latest Ref Rng & Units 7/31/2024     2:54 AM 8/1/2024     3:36 AM 8/2/2024     2:54 AM 8/3/2024     3:03 AM 8/4/2024     4:28 AM 8/5/2024    12:00  AM 8/5/2024     2:54 PM   Anticoagulation Monitoring   INR        3.40   INR Date        8/5/2024   INR Goal        2.0-3.0   Last Week Total        0 mg   Next Week Total        4 mg   Sun        -   Mon        Hold (8/5)   Tue        Hold (8/6)   Wed        2 mg (8/7)   Thu        2 mg (8/8)   Fri        -   Sat        -   Historical INR 2.00 - 3.00 1.25  1.29  1.33  1.55  1.77  3.40            This result is from an external source.       Anticoagulation Summary  As of 8/5/2024      INR goal:  2.0-3.0   TTR:  --   INR used for dosing:  3.40 (8/5/2024)   Warfarin maintenance plan:  No maintenance plan   Next INR check:  8/9/2024   Target end date:      Indications    S/P CABG (coronary artery bypass graft) [Z95.1]  Left ventricular thrombus [I51.3]                 Anticoagulation Episode Summary       INR check location:      Preferred lab:      Send INR reminders to:  [facility] CLINICAL POOL    Comments:            Anticoagulation Care Providers       Provider Role Specialty Phone number    Saima Valles APRN Referring Cardiothoracic Surgery [phone]            Current Medications:   Prior to Admission medications    Medication Sig Start Date End Date Taking? Authorizing Provider   Aspirin Low Dose 81 MG EC tablet Take 1 tablet by mouth Daily. 7/24/24   Jr Rubén Sanon MD   atorvastatin (LIPITOR) 40 MG tablet Take 1 tablet by mouth Every Night for 360 days. 8/4/24 7/30/25  Saima Valles APRN   escitalopram (LEXAPRO) 5 MG tablet Take 1 tablet by mouth Daily. 7/24/24   Jr Rubén Sanon MD   furosemide (LASIX) 40 MG tablet Take 1 tablet by mouth Daily for 30 days. 8/5/24 9/4/24  Saima Valles APRN   HYDROcodone-acetaminophen (NORCO) 5-325 MG per tablet Take 1 tablet by mouth Every 4 (Four) Hours As Needed for Moderate Pain for up to 7 days. 8/4/24 8/11/24  Saima Valles APRN   metoprolol succinate XL (TOPROL-XL) 25 MG 24 hr tablet Take 1 tablet by mouth Every 12 (Twelve)  Hours for 90 days. 8/4/24 11/2/24  Saima Valles APRN   potassium chloride (KLOR-CON M20) 20 MEQ CR tablet Take 1 tablet by mouth Daily for 30 days. 8/5/24 9/4/24  Saima Valles APRN   warfarin (COUMADIN) 2 MG tablet 4mg daily titrate to INR.  Indications: Other - full anticoagulation, thrombus lv 8/4/24   Saima Valles APRN       Allergies:    Patient has no known allergies.    This patient is managed via a collaborative agreement, pursuant to IC 25-26-16. The signed protocol is kept in the [facility] at [address] [place] [address].    Medical history:   Past Medical History:   Diagnosis Date    Anxiety     Arthritis     At risk for sleep apnea     5    Back pain     started 7/25/2024 in lower back happens sporadically  no noticable blood in urine  no c/o nausea/ vomiting    Cardiomyopathy     Coronary artery disease     Heart valve disease     mitral    Hyperlipidemia     Hypertension        Other: During CABG procedure 7/30, it was noted that patient had small apical LV thrombus, however when reviewing preoperative reports, thrombus was there before.     Social history:   Social History     Tobacco Use    Smoking status: Former     Types: Cigarettes     Passive exposure: Past    Smokeless tobacco: Never    Tobacco comments:     Smoked 1 pack daily  for 15 years quit 1999   Substance Use Topics    Alcohol use: Yes     Comment: 1-2 monthly       Vaccine History:   Immunization History   Administered Date(s) Administered    COVID-19 (MODERNA) Monovalent Original Booster 10/28/2021    DTaP 10/16/2012    Fluad Quad 65+ 10/12/2021    Fluzone High Dose =>65 Years ([employer] ONLY) 09/20/2018    Hepatitis A 06/24/2019    Influenza Quad Vaccine (Inpatient) 09/22/2020    Influenza Split Preservative Free ID 10/16/2012, 01/27/2014    Pneumococcal Conjugate 13-Valent (PCV13) 03/19/2018    Pneumococcal Polysaccharide (PPSV23) 01/26/1999, 10/16/2012    Td, Not Adsorbed 01/26/1999     Tdap 02/09/2013    Zostavax 11/07/2014       Education: Provided verbal and written education that included, but is not limited to, dosing, side effects, signs/symptoms of bleeding and clotting, when to seek medical attention, food/drug interactions, and importance of wearing medical identification bracelet.    Clinic Contract:  Reviewed the clinic's patient contract; patient/caregiver verbalized understanding and signed contract.    Plan:  1. INR supratherapeutic today and increased very rapidly by 1.63 compared to yesterday's INR. Suspect INR will continue to increase the next day or so as patient took increased 4 mg doses Saturday and Sunday. Therefore, have instructed patient to hold warfarin today and tomorrow and then take reduced doses of 2 mg 8/7 and 8/8.   2. Home health to check INR and call into clinic prior to dose 8/9.     Counseled patient on increased bleeding risk associated with elevated INR, signs/symptoms to monitor for, and when to seek medical attention      Yudith Lima PharmD  8/5/2024  15:01 EDT

## 2024-08-05 NOTE — OUTREACH NOTE
Prep Survey      Flowsheet Row Responses   [facility] facility patient discharged from? [place]   Is LACE score < 7 ? No   Eligibility Readm Mgmt   Discharge diagnosis STERNOTOMY, CORONARY ARTERY BYPASS TIMES FOUR  WITH LEFT INTERNAL MAMMARY ARTERY GRAFT AND LEFT SAPHENOUS  ENDOSCOPIC HARVESTED VEIN GRAFT,  MITRAL VALVE REPAIR,  TRANSESOPHAGEAL ECHOCARDIOGRAM WITH ANESTHESIA, PRP.   Does the patient have one of the following disease processes/diagnoses(primary or secondary)? Cardiothoracic surgery   Does the patient have Home health ordered? Yes   What is the Home health agency?  [facility]   Is there a DME ordered? No   Prep survey completed? Yes            Patricia GLEZ - Registered Nurse

## 2024-08-05 NOTE — CASE MANAGEMENT/SOCIAL WORK
Case Management Discharge Note      Final Note: Pt discharged home with [facility].….Peg WORLEY/PEDRITO JESSICA         Selected Continued Care - Discharged on 8/4/2024 Admission date: 7/30/2024 - Discharge disposition: Home-Health Care [facility]      Destination    No services have been selected for the patient.                Durable Medical Equipment    No services have been selected for the patient.                Dialysis/Infusion    No services have been selected for the patient.                Home Medical Care Coordination complete.      Service Provider Selected Services Address Phone Fax Patient Preferred     [facility] [address] [facility] [place] [address] [phone] --              Therapy    No services have been selected for the patient.                Community Resources    No services have been selected for the patient.                Community & DME    No services have been selected for the patient.                         Final Discharge Disposition Code: 06 - home with home health care

## 2024-08-06 ENCOUNTER — HOME CARE VISIT (OUTPATIENT)
Dept: HOME HEALTH SERVICES | Facility: HOME HEALTHCARE | Age: 69
End: 2024-08-06
Payer: MEDICARE

## 2024-08-06 VITALS
HEART RATE: 79 BPM | OXYGEN SATURATION: 98 % | TEMPERATURE: 97.8 F | SYSTOLIC BLOOD PRESSURE: 114 MMHG | DIASTOLIC BLOOD PRESSURE: 70 MMHG

## 2024-08-06 PROCEDURE — G0151 HHCP-SERV OF PT,EA 15 MIN: HCPCS

## 2024-08-07 NOTE — REMOTE PATIENT MONITORING NOTE
Remote patient monitoring set up complete. Device connectivity successful. Instructions left in the home with the following information:  the monitoring process,  how to contact the [facility] ([facility]), and when to call the [facility] for any technical issues with the device. The patient was also informed that they may receive follow up calls from [facility] when an alarm triggers and that the caller ID will identify the call as an unknown number.  The patient was informed that [facility] is  not a replacement for emergency services and to notify [facility] for any patient emergency.

## 2024-08-08 ENCOUNTER — READMISSION MANAGEMENT (OUTPATIENT)
Dept: CALL CENTER | Facility: HOSPITAL | Age: 69
End: 2024-08-08
Payer: MEDICARE

## 2024-08-08 ENCOUNTER — TELEPHONE (OUTPATIENT)
Dept: CARDIAC SURGERY | Facility: CLINIC | Age: 69
End: 2024-08-08
Payer: MEDICARE

## 2024-08-08 NOTE — OUTREACH NOTE
CT Surgery Week 1 Survey      Flowsheet Row Responses   [facility] patient discharged from? [place]   Does the patient have one of the following disease processes/diagnoses(primary or secondary)? Cardiothoracic surgery   Week 1 attempt successful? Yes   Call start time 1231   Call end time 1236   List who call center can speak with pt   Meds reviewed with patient/caregiver? Yes   Is the patient having any side effects they believe may be caused by any medication additions or changes? No   Does the patient have all medications related to this admission filled (includes all antibiotics, pain medications, cardiac medications, etc.) Yes   Is the patient taking all medications as directed (includes completed medication regime)? Yes   Comments regarding appointments Pt has surgery f/u on 8-.   Does the patient have a primary care provider?  Yes   Does the patient have an appointment scheduled with their C/T surgeon? Yes   Has the patient kept scheduled appointments due by today? N/A   Has home health visited the patient within 72 hours of discharge? Yes   Psychosocial issues? No   Did the patient receive a copy of their discharge instructions? Yes   Nursing interventions Reviewed instructions with patient   What is the patient's perception of their health status since discharge? Improving   Is the patient/caregiver able to teach back normal signs of recovery? Nausea and lack of appetite   Is the patient /caregiver able to teach back basic post-op care? Drive as instructed by MD in discharge instructions, Shower daily, No tub bath, swimming, or hot tub until instructed by MD, Lifting as instructed by MD in discharge instructions   Is the patient/caregiver able to teach back signs and symptoms of incisional infection? Increased redness, swelling or pain at the incisonal site, Increased drainage or bleeding, Incisional warmth, Pus or odor from incision, Fever   Is the patient/caregiver able to teach back  steps to recovery at home? Set small, achievable goals for return to baseline health, Rest and rebuild strength, gradually increase activity   Week 1 call completed? Yes   Is the patient interested in additional calls from an ambulatory ? No   Would this patient benefit from a Referral to [facility] Social Work? No   Wrap up additional comments Pt reports improving. Incision healing well. Pt has all medications. HH has visitied. PT does not feel pt needs assistance at this time.   Call end time 123              Ary ADHIKARI - Registered Nurse

## 2024-08-08 NOTE — TELEPHONE ENCOUNTER
Caller: CHIVO SANABRIA    Relationship: Emergency Contact    Best call back number: [phone]    What is the best time to reach you: ANY    Who are you requesting to speak with (clinical staff, provider,  specific staff member): CLINICAL    Do you know the name of the person who called: NA    What was the call regarding:PT DAUGHTER CALLING IN ASKING FOR CALL BACK IN REGARDS TO HAVING HER DADS SUTURES REMOVED- TIMEFRAME & CARE/ TREATMENT UNTIL 1 MONTH POST OP.

## 2024-08-08 NOTE — TELEPHONE ENCOUNTER
Spoke with daughter. Sutures were in place in chest tube sites at discharge. No drainage from chest tube sites. Will have home health remove sutures at next visit. Gave verbal order to Nina with [facility].

## 2024-08-09 ENCOUNTER — ANTICOAGULATION VISIT (OUTPATIENT)
Dept: PHARMACY | Facility: HOSPITAL | Age: 69
End: 2024-08-09
Payer: MEDICARE

## 2024-08-09 ENCOUNTER — HOME CARE VISIT (OUTPATIENT)
Dept: HOME HEALTH SERVICES | Facility: HOME HEALTHCARE | Age: 69
End: 2024-08-09
Payer: MEDICARE

## 2024-08-09 DIAGNOSIS — I51.3 LEFT VENTRICULAR THROMBUS: ICD-10-CM

## 2024-08-09 DIAGNOSIS — Z95.1 S/P CABG (CORONARY ARTERY BYPASS GRAFT): Primary | ICD-10-CM

## 2024-08-09 LAB
HH POC INTERNATIONAL NORMALIZATION RATIO: 2.4
HH POC PROTIME: 24.3 SECONDS
INR PPP: 2.4 (ref 2–3)

## 2024-08-09 PROCEDURE — G0299 HHS/HOSPICE OF RN EA 15 MIN: HCPCS

## 2024-08-09 NOTE — PROGRESS NOTES
Anticoagulation Clinic Progress Note - Home INR Testing     INR Goal: 2 - 3  Today's INR: 2.4 (therapeutic). INR result was called in today by Yudith (home health nurse).   Current warfarin dose:  Doses of past 7 days are as follows:      : warfarin 2 mg  8/3-: warfarin 4 mg  -: held doses  -: warfarin 2 mg    Total weekly dose of past 7 days = 14 mg    INR History:      Latest Ref Rng & Units 2024     2:54 AM 8/3/2024     3:03 AM 2024     4:28 AM 2024    12:00 AM 2024     2:54 PM 2024    12:00 AM 2024    11:44 AM   Anticoagulation Monitoring   INR      3.40  2.40   INR Date      2024   INR Goal      2.0-3.0  2.0-3.0   Last Week Total      10 mg  14 mg   Next Week Total      4 mg  14 mg   Sun      -  2 mg   Mon      Hold ()  -   Tue      Hold ()  -   Wed      2 mg ()  -   Thu      2 mg ()  -   Fri      -  2 mg   Sat      -  2 mg   Historical INR 2.00 - 3.00 1.33  1.55  1.77  3.40      2.40            This result is from an external source.       Anticoagulation Summary  As of 2024      INR goal:  2.0-3.0   TTR:  --   INR used for dosin.40 (2024)   Warfarin maintenance plan:  2 mg every day   Weekly warfarin total:  14 mg   Plan last modified:  Kerry Feng, PharmD (2024)   Next INR check:  2024   Target end date:      Indications    S/P CABG (coronary artery bypass graft) [Z95.1]  Left ventricular thrombus [I51.3]                 Anticoagulation Episode Summary       INR check location:      Preferred lab:      Send INR reminders to:  [facility] CLINICAL POOL    Comments:            Anticoagulation Care Providers       Provider Role Specialty Phone number    Saima Valles APRN Referring Cardiothoracic Surgery [phone]            Clinic Interview:   Patient Findings     Negatives:  Signs/symptoms of thrombosis, Signs/symptoms of bleeding,   Laboratory test error suspected, Change in health, Change in alcohol  use,   Change in activity, Upcoming invasive procedure, Emergency department   visit, Upcoming dental procedure, Missed doses, Extra doses, Change in   medications, Change in diet/appetite, Hospital admission, Bruising, Other   complaints      Clinical Outcomes     Negatives:  Major bleeding event, Thromboembolic event,   Anticoagulation-related hospital admission, Anticoagulation-related ED   visit, Anticoagulation-related fatality        Current Medications:   Prior to Admission medications    Medication Sig Start Date End Date Taking? Authorizing Provider   Aspirin Low Dose 81 MG EC tablet Take 1 tablet by mouth Daily. 7/24/24   Jr Rubén Sanon MD   atorvastatin (LIPITOR) 40 MG tablet Take 1 tablet by mouth Every Night for 360 days. 8/4/24 7/30/25  Saima Valles APRN   escitalopram (LEXAPRO) 5 MG tablet Take 1 tablet by mouth Daily. 7/24/24   Jr Rubén Sanon MD   furosemide (LASIX) 40 MG tablet Take 1 tablet by mouth Daily for 30 days. 8/5/24 9/4/24  Saima Valles APRN   HYDROcodone-acetaminophen (NORCO) 5-325 MG per tablet Take 1 tablet by mouth Every 4 (Four) Hours As Needed for Moderate Pain for up to 7 days. 8/4/24 8/11/24  Saima Valles APRN   metoprolol succinate XL (TOPROL-XL) 25 MG 24 hr tablet Take 1 tablet by mouth Every 12 (Twelve) Hours for 90 days. 8/4/24 11/2/24  Saima Valles APRN   potassium chloride (KLOR-CON M20) 20 MEQ CR tablet Take 1 tablet by mouth Daily for 30 days. 8/5/24 9/4/24  Saima Valles APRN   warfarin (COUMADIN) 2 MG tablet 4mg daily titrate to INR.  Indications: Other - full anticoagulation, thrombus lv 8/4/24   Saima Valles APRN       Medical history:   Past Medical History:   Diagnosis Date    Anxiety     Arthritis     At risk for sleep apnea     5    Back pain     started 7/25/2024 in lower back happens sporadically  no noticable blood in urine  no c/o nausea/ vomiting    Cardiomyopathy     Coronary artery disease     Heart valve disease      mitral    Hyperlipidemia     Hypertension        Social history:   Social History     Tobacco Use    Smoking status: Former     Types: Cigarettes     Passive exposure: Past    Smokeless tobacco: Never    Tobacco comments:     Smoked 1 pack daily  for 15 years quit 1999   Substance Use Topics    Alcohol use: Yes     Comment: 1-2 monthly       Allergies:    Patient has no known allergies.      This patient is managed via a collaborative agreement, pursuant to IC 25-26-16. The signed protocol is kept in the [facility] at [address] [place] [facility].    Education: Sudhir Diaz has been instructed to call if any changes in medications, doses, concerns, etc. Patient was provided dosing instructions and expressed verbal understanding and has no further questions at this time.    Plan:  1. Continue total weekly dose of past 7 days; warfarin 2 mg PO daily. Total weekly dose = 14 mg.   2. INR should be tested in 3 days (8/12) by home health nurse and called into clinic. Checking INR so soon given new initiation of warfarin and fluctuations in INR.    Kerry Feng, PharmD  8/9/2024  11:47 EDT

## 2024-08-10 VITALS
TEMPERATURE: 98.3 F | HEART RATE: 68 BPM | DIASTOLIC BLOOD PRESSURE: 60 MMHG | RESPIRATION RATE: 17 BRPM | SYSTOLIC BLOOD PRESSURE: 108 MMHG

## 2024-08-12 ENCOUNTER — HOME CARE VISIT (OUTPATIENT)
Dept: HOME HEALTH SERVICES | Facility: HOME HEALTHCARE | Age: 69
End: 2024-08-12
Payer: MEDICARE

## 2024-08-12 ENCOUNTER — ANTICOAGULATION VISIT (OUTPATIENT)
Dept: PHARMACY | Facility: HOSPITAL | Age: 69
End: 2024-08-12
Payer: MEDICARE

## 2024-08-12 VITALS
DIASTOLIC BLOOD PRESSURE: 62 MMHG | OXYGEN SATURATION: 100 % | SYSTOLIC BLOOD PRESSURE: 116 MMHG | HEART RATE: 67 BPM | TEMPERATURE: 97.9 F | RESPIRATION RATE: 17 BRPM

## 2024-08-12 DIAGNOSIS — I51.3 LEFT VENTRICULAR THROMBUS: ICD-10-CM

## 2024-08-12 DIAGNOSIS — Z95.1 S/P CABG (CORONARY ARTERY BYPASS GRAFT): Primary | ICD-10-CM

## 2024-08-12 LAB
HH POC INTERNATIONAL NORMALIZATION RATIO: 3
HH POC PROTIME: 30.4 SECONDS
INR PPP: 3 (ref 2–3)

## 2024-08-12 PROCEDURE — G0299 HHS/HOSPICE OF RN EA 15 MIN: HCPCS

## 2024-08-13 NOTE — PROGRESS NOTES
Anticoagulation Clinic Progress Note - Home INR Testing     INR Goal: 2 - 3  Today's INR: 3.0 (therapeutic). INR result was called in today by Yudith ([facility]).   Current warfarin dose: warfarin 2 mg PO daily. Current total weekly dose = 14 mg per week.     INR History:      Latest Ref Rng & Units 8/3/2024     3:03 AM 8/4/2024     4:28 AM 8/5/2024    12:00 AM 8/5/2024     2:54 PM 8/9/2024    12:00 AM 8/9/2024    11:44 AM 8/12/2024    12:00 AM   Anticoagulation Monitoring   INR     3.40  2.40 3.00   INR Date     8/5/2024 8/9/2024 8/12/2024   INR Goal     2.0-3.0  2.0-3.0 2.0-3.0   Trend        Down   Last Week Total     10 mg  14 mg 10 mg   Next Week Total     4 mg  14 mg 13 mg   Sun     -  2 mg 2 mg   Mon     Hold (8/5)  - 1 mg   Tue     Hold (8/6)  - 2 mg   Wed     2 mg (8/7)  - 2 mg   Thu     2 mg (8/8)  - 2 mg   Fri     -  2 mg 2 mg   Sat     -  2 mg 2 mg   Historical INR 2.00 - 3.00 1.55  1.77  3.40      2.40      3.00           This result is from an external source.       Anticoagulation Summary  As of 8/12/2024      INR goal:  2.0-3.0   TTR:  --   INR used for dosing:  3.00 (8/12/2024)   Warfarin maintenance plan:  1 mg every Mon; 2 mg all other days   Weekly warfarin total:  13 mg   Plan last modified:  Yudith Lima, PharmD (8/13/2024)   Next INR check:  8/19/2024   Target end date:      Indications    S/P CABG (coronary artery bypass graft) [Z95.1]  Left ventricular thrombus [I51.3]                 Anticoagulation Episode Summary       INR check location:      Preferred lab:      Send INR reminders to:  [facility] CLINICAL POOL    Comments:            Anticoagulation Care Providers       Provider Role Specialty Phone number    Saima VallesDONITA Referring Cardiothoracic Surgery [phone]            Clinic Interview:   Patient Findings     Negatives:  Signs/symptoms of thrombosis, Signs/symptoms of bleeding,   Laboratory test error suspected, Change in health, Change in alcohol  use,   Change in activity, Upcoming invasive procedure, Emergency department   visit, Upcoming dental procedure, Missed doses, Extra doses, Change in   medications, Change in diet/appetite, Hospital admission, Bruising, Other   complaints      Clinical Outcomes     Negatives:  Major bleeding event, Thromboembolic event,   Anticoagulation-related hospital admission, Anticoagulation-related ED   visit, Anticoagulation-related fatality        Current Medications:   Prior to Admission medications    Medication Sig Start Date End Date Taking? Authorizing Provider   Aspirin Low Dose 81 MG EC tablet Take 1 tablet by mouth Daily. 7/24/24   Jr Rubén Sanon MD   atorvastatin (LIPITOR) 40 MG tablet Take 1 tablet by mouth Every Night for 360 days. 8/4/24 7/30/25  Saima Valles APRN   escitalopram (LEXAPRO) 5 MG tablet Take 1 tablet by mouth Daily. 7/24/24   Jr Rubén Sanno MD   furosemide (LASIX) 40 MG tablet Take 1 tablet by mouth Daily for 30 days. 8/5/24 9/4/24  Saima Valles APRN   Melatonin 5 MG chewable tablet Chew 5 mg every night at bedtime. Indications: sleep 8/5/24   Provider, MD Odalys   metoprolol succinate XL (TOPROL-XL) 25 MG 24 hr tablet Take 1 tablet by mouth Every 12 (Twelve) Hours for 90 days. 8/4/24 11/2/24  Saima Valles APRN   potassium chloride (KLOR-CON M20) 20 MEQ CR tablet Take 1 tablet by mouth Daily for 30 days. 8/5/24 9/4/24  Saima Valles APRN   warfarin (COUMADIN) 2 MG tablet 4mg daily titrate to INR.  Indications: Other - full anticoagulation, thrombus lv  Patient taking differently: Take 2 mg by mouth Every Night. Indications: Other - full anticoagulation, thrombus lv 8/4/24   Saima Valles APRN       Medical history:   Past Medical History:   Diagnosis Date    Anxiety     Arthritis     At risk for sleep apnea     5    Back pain     started 7/25/2024 in lower back happens sporadically  no noticable blood in urine  no c/o nausea/ vomiting    Cardiomyopathy      Coronary artery disease     Heart valve disease     mitral    Hyperlipidemia     Hypertension        Social history:   Social History     Tobacco Use    Smoking status: Former     Types: Cigarettes     Passive exposure: Past    Smokeless tobacco: Never    Tobacco comments:     Smoked 1 pack daily  for 15 years quit 1999   Substance Use Topics    Alcohol use: Yes     Comment: 1-2 monthly       Allergies:    Patient has no known allergies.    This patient is managed via a collaborative agreement, pursuant to IC [id number]. The signed protocol is kept in the [facility] at [address] [place] [address].    Education: Sudhir Diaz has been instructed to call if any changes in medications, doses, concerns, etc. Patient was provided dosing instructions and expressed verbal understanding and has no further questions at this time.    Plan:  1. INR increased by 0.6 in 3 days since last INR check.  decrease by 7 %; warfarin 2 mg PO daily, except warfarin 1 mg PO on Monday.  New total weekly dose = 13 mg.   2. INR should be tested weekly and called into clinic.       Yudith Lima, PharmD  8/13/2024  13:46 EDT

## 2024-08-15 ENCOUNTER — HOME CARE VISIT (OUTPATIENT)
Dept: HOME HEALTH SERVICES | Facility: HOME HEALTHCARE | Age: 69
End: 2024-08-15
Payer: MEDICARE

## 2024-08-15 PROCEDURE — G0299 HHS/HOSPICE OF RN EA 15 MIN: HCPCS

## 2024-08-16 VITALS
WEIGHT: 174.6 LBS | OXYGEN SATURATION: 100 % | SYSTOLIC BLOOD PRESSURE: 124 MMHG | DIASTOLIC BLOOD PRESSURE: 60 MMHG | HEART RATE: 72 BPM | TEMPERATURE: 98.3 F | BODY MASS INDEX: 27.35 KG/M2 | RESPIRATION RATE: 17 BRPM

## 2024-08-19 ENCOUNTER — HOME CARE VISIT (OUTPATIENT)
Dept: HOME HEALTH SERVICES | Facility: HOME HEALTHCARE | Age: 69
End: 2024-08-19
Payer: MEDICARE

## 2024-08-19 ENCOUNTER — ANTICOAGULATION VISIT (OUTPATIENT)
Dept: PHARMACY | Facility: HOSPITAL | Age: 69
End: 2024-08-19
Payer: MEDICARE

## 2024-08-19 VITALS
SYSTOLIC BLOOD PRESSURE: 118 MMHG | OXYGEN SATURATION: 100 % | HEART RATE: 63 BPM | WEIGHT: 175 LBS | BODY MASS INDEX: 27.41 KG/M2 | RESPIRATION RATE: 17 BRPM | DIASTOLIC BLOOD PRESSURE: 66 MMHG | TEMPERATURE: 98.4 F

## 2024-08-19 DIAGNOSIS — Z95.1 S/P CABG (CORONARY ARTERY BYPASS GRAFT): Primary | ICD-10-CM

## 2024-08-19 DIAGNOSIS — I51.3 LEFT VENTRICULAR THROMBUS: ICD-10-CM

## 2024-08-19 LAB
HH POC INTERNATIONAL NORMALIZATION RATIO: 2.5
HH POC PROTIME: 25.6 SECONDS
INR PPP: 2.5 (ref 2–3)

## 2024-08-19 PROCEDURE — G0299 HHS/HOSPICE OF RN EA 15 MIN: HCPCS

## 2024-08-19 NOTE — PROGRESS NOTES
Anticoagulation Clinic Progress Note - Home INR Testing     INR Goal: 2 - 3  Today's INR: 2.5 (therapeutic). INR result was called in today by Yudith (home health nurse).   Current warfarin dose: warfarin 2 mg PO daily, except warfarin 1 mg PO on Monday.  Current total weekly dose = 13 mg per week.     INR History:      Latest Ref Rng & Units 2024    12:00 AM 2024     2:54 PM 2024    12:00 AM 2024    11:44 AM 2024    12:00 AM 2024    12:00 AM 2024    10:37 AM   Anticoagulation Monitoring   INR   3.40  2.40 3.00  2.50   INR Date   2024   INR Goal   2.0-3.0  2.0-3.0 2.0-3.0  2.0-3.0   Trend      Down  Same   Last Week Total   10 mg  14 mg 10 mg  13 mg   Next Week Total   4 mg  14 mg 13 mg  13 mg   Sun   -  2 mg 2 mg  2 mg   Mon   Hold ()  - 1 mg  1 mg   Tue   Hold ()  - 2 mg  2 mg   Wed   2 mg ()  - 2 mg  2 mg   Thu   2 mg ()  - 2 mg  2 mg   Fri   -  2 mg 2 mg  2 mg   Sat   -  2 mg 2 mg  2 mg   Historical INR 2.00 - 3.00 3.40      2.40      3.00     2.50            This result is from an external source.       Anticoagulation Summary  As of 2024      INR goal:  2.0-3.0   TTR:  100.0% (5 d)   INR used for dosin.50 (2024)   Warfarin maintenance plan:  1 mg every Mon; 2 mg all other days   Weekly warfarin total:  13 mg   No change documented:  Kerry Feng, PharmD   Plan last modified:  Yudith Lima, PharmD (2024)   Next INR check:  2024   Target end date:      Indications    S/P CABG (coronary artery bypass graft) [Z95.1]  Left ventricular thrombus [I51.3]                 Anticoagulation Episode Summary       INR check location:      Preferred lab:      Send INR reminders to:  [facility] CLINICAL POOL    Comments:            Anticoagulation Care Providers       Provider Role Specialty Phone number    Saima Valles APRN Referring Cardiothoracic Surgery [phone]            Clinic Interview:    Patient Findings     Negatives:  Signs/symptoms of thrombosis, Signs/symptoms of bleeding,   Laboratory test error suspected, Change in health, Change in alcohol use,   Change in activity, Upcoming invasive procedure, Emergency department   visit, Upcoming dental procedure, Missed doses, Extra doses, Change in   medications, Change in diet/appetite, Hospital admission, Bruising, Other   complaints      Clinical Outcomes     Negatives:  Major bleeding event, Thromboembolic event,   Anticoagulation-related hospital admission, Anticoagulation-related ED   visit, Anticoagulation-related fatality        Current Medications:   Prior to Admission medications    Medication Sig Start Date End Date Taking? Authorizing Provider   Aspirin Low Dose 81 MG EC tablet Take 1 tablet by mouth Daily. 7/24/24   Jr Rubén Sanon MD   atorvastatin (LIPITOR) 40 MG tablet Take 1 tablet by mouth Every Night for 360 days. 8/4/24 7/30/25  Saima Valles APRN   escitalopram (LEXAPRO) 5 MG tablet Take 1 tablet by mouth Daily. 7/24/24   Jr Rubén Sanon MD   furosemide (LASIX) 40 MG tablet Take 1 tablet by mouth Daily for 30 days. 8/5/24 9/4/24  Saima Valles APRN   Melatonin 5 MG chewable tablet Chew 5 mg every night at bedtime. Indications: sleep 8/5/24   Provider, MD Odalys   metoprolol succinate XL (TOPROL-XL) 25 MG 24 hr tablet Take 1 tablet by mouth Every 12 (Twelve) Hours for 90 days. 8/4/24 11/2/24  Saima Valles APRN   potassium chloride (KLOR-CON M20) 20 MEQ CR tablet Take 1 tablet by mouth Daily for 30 days. 8/5/24 9/4/24  Saima Valles APRN   warfarin (COUMADIN) 2 MG tablet 4mg daily titrate to INR.  Indications: Other - full anticoagulation, thrombus lv  Patient taking differently: Take 2 mg by mouth Every Night. Indications: Other - full anticoagulation, thrombus lv 8/4/24   Saima Valles APRN       Medical history:   Past Medical History:   Diagnosis Date    Anxiety     Arthritis     At risk for  sleep apnea     5    Back pain     started 7/25/2024 in lower back happens sporadically  no noticable blood in urine  no c/o nausea/ vomiting    Cardiomyopathy     Coronary artery disease     Heart valve disease     mitral    Hyperlipidemia     Hypertension        Social history:   Social History     Tobacco Use    Smoking status: Former     Types: Cigarettes     Passive exposure: Past    Smokeless tobacco: Never    Tobacco comments:     Smoked 1 pack daily  for 15 years quit 1999   Substance Use Topics    Alcohol use: Yes     Comment: 1-2 monthly       Allergies:    Patient has no known allergies.      This patient is managed via a collaborative agreement, pursuant to IC 25-26-16. The signed protocol is kept in the [facility] at [address] [place] [address].    Education: Sudhir Diaz has been instructed to call if any changes in medications, doses, concerns, etc. Patient was provided dosing instructions and expressed verbal understanding and has no further questions at this time.    Plan:  1. no change; warfarin 2 mg PO daily, except warfarin 1 mg PO on Monday.  Total weekly dose = 13 mg.   2. INR should be tested in 1 week by home health nurse and called into clinic.     Kerry Feng, PharmD  8/19/2024  10:38 EDT

## 2024-08-20 ENCOUNTER — OFFICE VISIT (OUTPATIENT)
Dept: CARDIOLOGY | Facility: CLINIC | Age: 69
End: 2024-08-20
Payer: MEDICARE

## 2024-08-20 VITALS
WEIGHT: 178 LBS | SYSTOLIC BLOOD PRESSURE: 109 MMHG | HEIGHT: 67 IN | DIASTOLIC BLOOD PRESSURE: 62 MMHG | BODY MASS INDEX: 27.94 KG/M2 | OXYGEN SATURATION: 98 % | HEART RATE: 69 BPM

## 2024-08-20 DIAGNOSIS — Z79.01 CHRONIC ANTICOAGULATION: ICD-10-CM

## 2024-08-20 DIAGNOSIS — R06.02 SHORTNESS OF BREATH: ICD-10-CM

## 2024-08-20 DIAGNOSIS — Z95.1 HX OF CABG: Primary | ICD-10-CM

## 2024-08-20 DIAGNOSIS — I44.7 LBBB (LEFT BUNDLE BRANCH BLOCK): ICD-10-CM

## 2024-08-20 DIAGNOSIS — I25.5 ISCHEMIC CARDIOMYOPATHY: ICD-10-CM

## 2024-08-20 DIAGNOSIS — I10 ESSENTIAL HYPERTENSION: ICD-10-CM

## 2024-08-20 DIAGNOSIS — I34.0 NONRHEUMATIC MITRAL VALVE REGURGITATION: ICD-10-CM

## 2024-08-20 DIAGNOSIS — R07.89 CHEST DISCOMFORT: ICD-10-CM

## 2024-08-20 DIAGNOSIS — I51.3 LEFT VENTRICULAR THROMBUS: ICD-10-CM

## 2024-08-20 DIAGNOSIS — Z95.1 S/P CABG (CORONARY ARTERY BYPASS GRAFT): ICD-10-CM

## 2024-08-20 PROCEDURE — 93000 ELECTROCARDIOGRAM COMPLETE: CPT | Performed by: INTERNAL MEDICINE

## 2024-08-20 PROCEDURE — 3074F SYST BP LT 130 MM HG: CPT | Performed by: INTERNAL MEDICINE

## 2024-08-20 PROCEDURE — 99214 OFFICE O/P EST MOD 30 MIN: CPT | Performed by: INTERNAL MEDICINE

## 2024-08-20 PROCEDURE — 1160F RVW MEDS BY RX/DR IN RCRD: CPT | Performed by: INTERNAL MEDICINE

## 2024-08-20 PROCEDURE — 1159F MED LIST DOCD IN RCRD: CPT | Performed by: INTERNAL MEDICINE

## 2024-08-20 PROCEDURE — 3078F DIAST BP <80 MM HG: CPT | Performed by: INTERNAL MEDICINE

## 2024-08-20 NOTE — PROGRESS NOTES
Encounter Date:08/20/2024  Last seen 7/3/2024      Patient ID: Sudhir Diaz is a 69 y.o. male.        Chief Complaint:  Chest discomfort  Shortness of breath  Left bundle branch block  Hypertension  Dyslipidemia     History of present illness  Patient had CABG at [facility] by Dr. Sanon on 7/30/2024.  Patient's postoperative course was essentially uneventful.  Patient was found to have LV thrombus with IntraOp WADE and patient was discharged home on warfarin.  Home health is following INR.  Recent INR was 2.5.  (8/19/2024    The patient has been without any chest discomfort ,shortness of breath, palpitations, dizziness or syncope.  Denies having any headache ,abdominal pain ,nausea, vomiting , diarrhea constipation, loss of weight or loss of appetite.  Denies having any excessive bruising ,hematuria or blood in the stool.    Review of all systems negative except as indicated.    Reviewed ROS.    Assessment and Plan      ]]]]]]]]]]]]]]]]]]]  History  ========  -Status post CABG x 4.  Mitral valve repair, closure of left atrial appendage internally-7/30/2024-[facility]-Dr. Sanon  Skeletonized LIMA to LAD.  Vein graft to PDA.  Vein graft to LV branch 1.  T graft BAL graft to LV branch 2.  Mitral valve repair with a 28 mm [employer] 3D ring.  Closure of left atrial appendage internally.     - Left bundle branch block-new since 8/1/2020.     - Cardiomyopathy-ischemic versus nonischemic.  Ejection fraction 40%-cath-6/19/2024  Ejection fraction 20 to 25%-echo-6/11/2024    - Left ventricular apical thrombus (seen on WADE intraoperatively)    - Anticoagulation-on Coumadin     Cardiac catheterization 6/19/2024   No evidence for pulmonary hypertension is seen.  Left ventricle is normal in size with inferior wall and apical hypokinesis with estimated ejection fraction of 40%.  No mitral regurgitation was seen.  Left main coronary artery is normal.  Left anterior descending artery has mid segment 80%  disease.  Distal vessel is small in caliber.  Circumflex coronary artery is a small caliber vessel that has 99% disease in the midsegment.  Right coronary artery is a large and dominant vessel that has multiple areas of 95 to 99% disease.      Stress Cardiolite test 6/11/2024   Lexiscan Cardiolite test is abnormal with inferior ischemia.  Gated SPECT images revealed left ventricular enlargement with diffuse hypocontractility with ejection fraction of 36%.     Echocardiogram 6/11/2024  Moderate mitral mild tricuspid and pulmonic regurgitation is present.  Left atrial enlargement..  Left ventricle is enlarged with paradoxical septal motion (left bundle branch block) and severe and diffuse hypocontractility with ejection fraction of 20 to 25%.  Grade 1 left ventricular diastolic dysfunction is present.  Right ventricle is normal in size with hypocontractility.  TAPSE 1.3 cm..  Mild pulmonary hypertension.     - Hypertension dyslipidemia     - Large right groin lipoma.     - Status post cholecystectomy shoulder surgery and carpal tunnel surgery.     - Family history negative for coronary artery disease     - Former smoker     - No known allergies  ===========  Plan  ==========  Patient had CABG left atrial appendage closure and mitral valve repair at [facility] by Dr. Sanon on 7/30/2024.    Patient's postoperative course was essentially uneventful.  Patient was found to have LV thrombus with IntraOp WADE and patient was discharged home on warfarin.  Home health is following INR.  Recent INR was 2.5.  (8/19/2024)    Ischemic cardiomyopathy.  Left ventricular thrombus.    Anticoagulation  Patient is on Coumadin.  INR on 8/19/2020 24-2.5    Left bundle branch block-new since 8/1/2020.  EKG 8/20/2024-sinus rhythm without left bundle branch block     Hypertension  Blood pressure 109/62    Dyslipidemia-patient is on Lipitor    Patient to join cardiac rehab.  Request was made.    Follow-up in the office in 3 months  with an echocardiogram to assess left ventricular function and evaluate left ventricular thrombus.  Duration of anticoagulation therapy depends on the findings.    Further plan will depend on patient's progress.     Reviewed and updated-8/20/2024.  ]]]]]]]]]]]]]]]]]]]            Diagnosis Plan   1. Hx of CABG  Cardiac Rehab Phase II    Adult Transthoracic Echo Complete W/ Cont if Necessary Per Protocol      2. Shortness of breath  Cardiac Rehab Phase II    Adult Transthoracic Echo Complete W/ Cont if Necessary Per Protocol      3. Essential hypertension  Cardiac Rehab Phase II    Adult Transthoracic Echo Complete W/ Cont if Necessary Per Protocol      4. LBBB (left bundle branch block)  Cardiac Rehab Phase II    Adult Transthoracic Echo Complete W/ Cont if Necessary Per Protocol      5. Chest discomfort  Cardiac Rehab Phase II    Adult Transthoracic Echo Complete W/ Cont if Necessary Per Protocol      6. S/P CABG (coronary artery bypass graft)  Cardiac Rehab Phase II    Adult Transthoracic Echo Complete W/ Cont if Necessary Per Protocol      7. Ischemic cardiomyopathy  Cardiac Rehab Phase II    Adult Transthoracic Echo Complete W/ Cont if Necessary Per Protocol      8. Nonrheumatic mitral valve regurgitation  Cardiac Rehab Phase II    Adult Transthoracic Echo Complete W/ Cont if Necessary Per Protocol      9. Chronic anticoagulation  Cardiac Rehab Phase II    Adult Transthoracic Echo Complete W/ Cont if Necessary Per Protocol      10. Left ventricular thrombus  Adult Transthoracic Echo Complete W/ Cont if Necessary Per Protocol      LAB RESULTS (LAST 7 DAYS)    CBC        BMP        CMP         BNP        TROPONIN        CoAg  Results from last 7 days   Lab Units 08/19/24  0000   INR  2.50       Creatinine Clearance  Estimated Creatinine Clearance: 73.3 mL/min (by C-G formula based on SCr of 0.94 mg/dL).    ABG        Radiology  No radiology results for the last day                The following portions of the  patient's history were reviewed and updated as appropriate: allergies, current medications, past family history, past medical history, past social history, past surgical history, and problem list.    Review of Systems   Constitutional: Negative for malaise/fatigue.   Cardiovascular:  Negative for chest pain, leg swelling, palpitations and syncope.   Respiratory:  Negative for shortness of breath.    Skin:  Negative for rash.   Gastrointestinal:  Negative for nausea and vomiting.   Neurological:  Negative for dizziness, light-headedness and numbness.   All other systems reviewed and are negative.        Current Outpatient Medications:     Aspirin Low Dose 81 MG EC tablet, Take 1 tablet by mouth Daily., Disp: 90 tablet, Rfl: 5    atorvastatin (LIPITOR) 40 MG tablet, Take 1 tablet by mouth Every Night for 360 days., Disp: 90 tablet, Rfl: 3    escitalopram (LEXAPRO) 5 MG tablet, Take 1 tablet by mouth Daily., Disp: 30 tablet, Rfl: 5    furosemide (LASIX) 40 MG tablet, Take 1 tablet by mouth Daily for 30 days., Disp: 30 tablet, Rfl: 0    Melatonin 5 MG chewable tablet, Chew 1 tablet every night at bedtime. Indications: sleep, Disp: , Rfl:     metoprolol succinate XL (TOPROL-XL) 25 MG 24 hr tablet, Take 1 tablet by mouth Every 12 (Twelve) Hours for 90 days., Disp: 60 tablet, Rfl: 2    potassium chloride (KLOR-CON M20) 20 MEQ CR tablet, Take 1 tablet by mouth Daily for 30 days., Disp: 30 tablet, Rfl: 0    warfarin (COUMADIN) 2 MG tablet, 4mg daily titrate to INR.  Indications: Other - full anticoagulation, thrombus lv (Patient taking differently: Take 1 tablet by mouth Every Night. 1mg Mondays, 2mg all other days  Indications: Other - full anticoagulation, thrombus lv), Disp: 60 tablet, Rfl: 2  No current facility-administered medications for this visit.    Facility-Administered Medications Ordered in Other Visits:     Chlorhexidine Gluconate Cloth 2 % pads 1 Application, 1 Application, Topical, Q12H PRN, Carlos Manuel Colón,  PA-C    No Known Allergies    Family History   Problem Relation Age of Onset    Malig Hyperthermia Neg Hx        Past Surgical History:   Procedure Laterality Date    CARDIAC CATHETERIZATION N/A 06/19/2024    Procedure: Left and Right Heart Cath with Coronary Angiography;  Surgeon: Peg Pettit MD;  Location: [facility] CATH INVASIVE LOCATION;  Service: Cardiovascular;  Laterality: N/A;    CARPAL TUNNEL RELEASE Bilateral     CHOLECYSTECTOMY      COLONOSCOPY      CORONARY ARTERY BYPASS GRAFT WITH MITRAL VALVE REPAIR/REPLACEMENT N/A 7/30/2024    Procedure: STERNOTOMY, CORONARY ARTERY BYPASS TIMES FOUR  WITH LEFT INTERNAL MAMMARY ARTERY GRAFT AND LEFT SAPHENOUS  ENDOSCOPIC HARVESTED VEIN GRAFT; MITRAL VALVE REPAIR; TRANSESOPHAGEAL ECHOCARDIOGRAM WITH ANESTHESIA, PRP.;  Surgeon: Jr Rubén Sanon MD;  Location: [facility];  Service: Cardiothoracic;  Laterality: N/A;    SHOULDER SURGERY Right        Past Medical History:   Diagnosis Date    Anxiety     Arthritis     At risk for sleep apnea     5    Back pain     started 7/25/2024 in lower back happens sporadically  no noticable blood in urine  no c/o nausea/ vomiting    Cardiomyopathy     Coronary artery disease     Heart valve disease     mitral    Hyperlipidemia     Hypertension        Family History   Problem Relation Age of Onset    Malig Hyperthermia Neg Hx        Social History     Socioeconomic History    Marital status: Single   Tobacco Use    Smoking status: Former     Types: Cigarettes     Passive exposure: Past    Smokeless tobacco: Never    Tobacco comments:     Smoked 1 pack daily  for 15 years quit 1999   Vaping Use    Vaping status: Never Used   Substance and Sexual Activity    Alcohol use: Yes     Comment: 1-2 monthly    Drug use: Never    Sexual activity: Defer           ECG 12 Lead    Date/Time: 8/20/2024 3:06 PM  Performed by: Peg Pettit MD    Authorized by: Peg Pettit MD  Comparison: compared with previous ECG   Similar to previous  "ECG  Comparison to previous ECG: Normal sinus rhythm nonspecific ST-T wave abnormalities 69/min normal axis normal intervals no ectopy compared to previous EKG no evidence for left bundle branch block is present.        Objective:       Physical Exam    /62 (BP Location: Left arm, Patient Position: Sitting, Cuff Size: Adult)   Pulse 69   Ht 170.2 cm (67\")   Wt 80.7 kg (178 lb)   SpO2 98% Comment: RA  BMI 27.88 kg/m²   The patient is alert, oriented and in no distress.    Vital signs as noted above.    Head and neck revealed no carotid bruits or jugular venous distension.  No thyromegaly or lymphadenopathy is present.    Lungs clear.  No wheezing.  Breath sounds are normal bilaterally.    Heart normal first and second heart sounds.  No murmur..  No pericardial rub is present.  No gallop is present.    Abdomen soft and nontender.  No organomegaly is present.    Extremities revealed good peripheral pulses without any pedal edema.    Skin warm and dry.  Incisions are looking well.    Musculoskeletal system is grossly normal.    CNS grossly normal.    Reviewed and updated.        "

## 2024-08-23 ENCOUNTER — HOME CARE VISIT (OUTPATIENT)
Dept: HOME HEALTH SERVICES | Facility: HOME HEALTHCARE | Age: 69
End: 2024-08-23
Payer: MEDICARE

## 2024-08-23 VITALS
TEMPERATURE: 98.5 F | RESPIRATION RATE: 17 BRPM | DIASTOLIC BLOOD PRESSURE: 67 MMHG | WEIGHT: 173 LBS | BODY MASS INDEX: 27.1 KG/M2 | SYSTOLIC BLOOD PRESSURE: 114 MMHG | HEART RATE: 69 BPM | OXYGEN SATURATION: 97 %

## 2024-08-23 PROCEDURE — G0299 HHS/HOSPICE OF RN EA 15 MIN: HCPCS

## 2024-08-26 ENCOUNTER — HOME CARE VISIT (OUTPATIENT)
Dept: HOME HEALTH SERVICES | Facility: HOME HEALTHCARE | Age: 69
End: 2024-08-26
Payer: COMMERCIAL

## 2024-08-26 ENCOUNTER — ANTICOAGULATION VISIT (OUTPATIENT)
Dept: PHARMACY | Facility: HOSPITAL | Age: 69
End: 2024-08-26
Payer: COMMERCIAL

## 2024-08-26 VITALS
OXYGEN SATURATION: 99 % | TEMPERATURE: 98.2 F | RESPIRATION RATE: 17 BRPM | SYSTOLIC BLOOD PRESSURE: 118 MMHG | DIASTOLIC BLOOD PRESSURE: 62 MMHG | WEIGHT: 170.1 LBS | HEART RATE: 64 BPM | BODY MASS INDEX: 26.64 KG/M2

## 2024-08-26 DIAGNOSIS — Z95.1 S/P CABG (CORONARY ARTERY BYPASS GRAFT): Primary | ICD-10-CM

## 2024-08-26 DIAGNOSIS — I51.3 LEFT VENTRICULAR THROMBUS: ICD-10-CM

## 2024-08-26 LAB
HH POC INTERNATIONAL NORMALIZATION RATIO: 2.1
HH POC PROTIME: 21.6 SECONDS
INR PPP: 2.1 (ref 2–3)

## 2024-08-26 PROCEDURE — G0299 HHS/HOSPICE OF RN EA 15 MIN: HCPCS

## 2024-08-26 NOTE — PROGRESS NOTES
Anticoagulation Clinic Progress Note - Home INR Testing     INR Goal: 2 - 3  Today's INR: 2.1 (therapeutic). INR result was called in today by JESSEE Zurita  nurse.   Current warfarin dose: warfarin 2 mg PO daily, except warfarin 1 mg PO on .  Current total weekly dose = 13 mg per week.     INR History:      Latest Ref Rng & Units 2024    12:00 AM 2024    11:44 AM 2024    12:00 AM 2024    12:00 AM 2024    10:37 AM 2024    12:00 AM 2024    10:45 AM   Anticoagulation Monitoring   INR   2.40 3.00  2.50  2.10   INR Date   2024   INR Goal   2.0-3.0 2.0-3.0  2.0-3.0  2.0-3.0   Trend    Down  Same  Same   Last Week Total   14 mg 10 mg  13 mg  13 mg   Next Week Total   14 mg 13 mg  13 mg  13 mg   Sun   2 mg 2 mg  2 mg  2 mg   Mon   - 1 mg  1 mg  1 mg   Tue   - 2 mg  2 mg  2 mg   Wed   - 2 mg  2 mg  2 mg   Thu   - 2 mg  2 mg  2 mg   Fri   2 mg 2 mg  2 mg  2 mg   Sat   2 mg 2 mg  2 mg  2 mg   Historical INR 2.00 - 3.00 2.40      3.00     2.50      2.10            This result is from an external source.       Anticoagulation Summary  As of 2024      INR goal:  2.0-3.0   TTR:  100.0% (1.7 wk)   INR used for dosin.10 (2024)   Warfarin maintenance plan:  1 mg every Mon; 2 mg all other days   Weekly warfarin total:  13 mg   No change documented:  Shae Clancy, PharmD   Plan last modified:  Yudith Lima, PharmD (2024)   Next INR check:  9/3/2024   Target end date:      Indications    S/P CABG (coronary artery bypass graft) [Z95.1]  Left ventricular thrombus [I51.3]                 Anticoagulation Episode Summary       INR check location:      Preferred lab:      Send INR reminders to:  JESSEE HAIR [facility] CLINICAL POOL    Comments:            Anticoagulation Care Providers       Provider Role Specialty Phone number    Saima Valles APRN Referring Cardiothoracic Surgery [phone]            Clinic Interview:   Patient  Findings     Negatives:  Signs/symptoms of thrombosis, Signs/symptoms of bleeding,   Laboratory test error suspected, Change in health, Change in alcohol use,   Change in activity, Upcoming invasive procedure, Emergency department   visit, Upcoming dental procedure, Missed doses, Extra doses, Change in   medications, Change in diet/appetite, Hospital admission, Bruising, Other   complaints      Clinical Outcomes     Negatives:  Major bleeding event, Thromboembolic event,   Anticoagulation-related hospital admission, Anticoagulation-related ED   visit, Anticoagulation-related fatality        Current Medications:   Prior to Admission medications    Medication Sig Start Date End Date Taking? Authorizing Provider   Aspirin Low Dose 81 MG EC tablet Take 1 tablet by mouth Daily. 7/24/24   Jr Rubén Sanon MD   atorvastatin (LIPITOR) 40 MG tablet Take 1 tablet by mouth Every Night for 360 days. 8/4/24 7/30/25  Saima Valles APRN   escitalopram (LEXAPRO) 5 MG tablet Take 1 tablet by mouth Daily. 7/24/24   Jr Rubén aSnon MD   furosemide (LASIX) 40 MG tablet Take 1 tablet by mouth Daily for 30 days. 8/5/24 9/4/24  Saima Valles APRN   Melatonin 5 MG chewable tablet Chew 1 tablet every night at bedtime. Indications: sleep 8/5/24   Provider, MD Odalys   metoprolol succinate XL (TOPROL-XL) 25 MG 24 hr tablet Take 1 tablet by mouth Every 12 (Twelve) Hours for 90 days. 8/4/24 11/2/24  Saima Valles APRN   potassium chloride (KLOR-CON M20) 20 MEQ CR tablet Take 1 tablet by mouth Daily for 30 days. 8/5/24 9/4/24  Saima Valles APRN   warfarin (COUMADIN) 2 MG tablet 4mg daily titrate to INR.  Indications: Other - full anticoagulation, thrombus lv  Patient taking differently: Take 1 tablet by mouth Every Night. 1mg Mondays, 2mg all other days  Indications: Other - full anticoagulation, thrombus lv 8/4/24   Saima Valles APRN       Medical history:   Past Medical History:   Diagnosis Date    Anxiety      Arthritis     At risk for sleep apnea     5    Back pain     started 7/25/2024 in lower back happens sporadically  no noticable blood in urine  no c/o nausea/ vomiting    Cardiomyopathy     Coronary artery disease     Heart valve disease     mitral    Hyperlipidemia     Hypertension        Social history:   Social History     Tobacco Use    Smoking status: Former     Types: Cigarettes     Passive exposure: Past    Smokeless tobacco: Never    Tobacco comments:     Smoked 1 pack daily  for 15 years quit 1999   Substance Use Topics    Alcohol use: Yes     Comment: 1-2 monthly       Allergies:    Patient has no known allergies.      This patient is managed via a collaborative agreement, pursuant to IC [id number]. The signed protocol is kept in the [facility] at [address] [place] [address].    Education: Sudhir Diaz has been instructed to call if any changes in medications, doses, concerns, etc. Patient was provided dosing instructions and expressed verbal understanding and has no further questions at this time.    Plan:  1. no change; warfarin 2 mg PO daily, except warfarin 1 mg PO on Mondays.   Total weekly dose = 13 mg.   2. INR should be tested 1 week and called into clinic by [facility]       Lolita Clancy, Julius  8/26/2024  10:46 EDT

## 2024-08-28 ENCOUNTER — OFFICE VISIT (OUTPATIENT)
Dept: CARDIAC SURGERY | Facility: CLINIC | Age: 69
End: 2024-08-28
Payer: MEDICARE

## 2024-08-28 VITALS
HEIGHT: 70 IN | RESPIRATION RATE: 18 BRPM | OXYGEN SATURATION: 98 % | TEMPERATURE: 97.7 F | DIASTOLIC BLOOD PRESSURE: 69 MMHG | WEIGHT: 175 LBS | HEART RATE: 57 BPM | BODY MASS INDEX: 25.05 KG/M2 | SYSTOLIC BLOOD PRESSURE: 117 MMHG

## 2024-08-28 DIAGNOSIS — Z98.890 S/P MITRAL VALVE REPAIR: ICD-10-CM

## 2024-08-28 DIAGNOSIS — Z98.890 HISTORY OF LEFT ATRIAL APPENDAGE CLOSURE: ICD-10-CM

## 2024-08-28 DIAGNOSIS — Z95.1 S/P CABG (CORONARY ARTERY BYPASS GRAFT): Primary | ICD-10-CM

## 2024-08-28 NOTE — PROGRESS NOTES
"CARDIOVASCULAR SURGERY FOLLOW-UP PROGRESS NOTE  Chief Complaint: None        HPI:   Dear Xavier and colleagues:    It was nice to see Sudhir Diaz in follow up 4 weeks after surgery.  As you know, he is a 69 y.o. male with ischemic cardiomyopathy, coronary artery disease, and ischemic mitral incompetence who underwent CABG x 4, mitral valve repair and closure of left atrial appendage on 7/30/2024. He did well postoperatively and continues to do well. He comes in today complaining of nothing.  His activity level has been good.       Physical Exam:         /69 (BP Location: Left arm, Patient Position: Sitting, Cuff Size: Adult)   Pulse 57   Temp 97.7 °F (36.5 °C)   Resp 18   Ht 177.8 cm (70\")   Wt 79.4 kg (175 lb)   SpO2 98%   BMI 25.11 kg/m²   Heart:  regular rate and rhythm, S1, S2 normal, no murmur, click, rub or gallop, no murmurs  Lungs:  clear to auscultation bilaterally  Extremities:  no edema  Incision(s):  mid chest healing well, bilateral leg healing well, sternum stable    Assessment/Plan:     S/P CABG, mitral valve repair, and closure of left atrial appendage. Overall, he is doing well.    No significant post-op complications    Keep incisions clean and dry  OK to drive if not taking narcotic pain medicine  OK to begin cardiac rehab  Follow-up as scheduled with cardiology  Follow-up as scheduled with PCP  Follow-up with CT surgery prn    No restrictions of activity.      Thank you for allowing me to participate in the care of your   patient.  Regards,  Rubén Sanon MD  "

## 2024-08-28 NOTE — LETTER
"August 28, 2024       No Recipients    Patient: Sudhir Diaz   YOB: 1955   Date of Visit: 8/28/2024     Dear Xavier Walden MD:       Thank you for referring Sudhir Diaz to me for evaluation. Below are the relevant portions of my assessment and plan of care.    If you have questions, please do not hesitate to call me. I look forward to following Sudhir along with you.         Sincerely,        Rubén Sanon MD        CC:   No Recipients    Jr Rubén Sanon MD  08/28/24 1341  Sign when Signing Visit  CARDIOVASCULAR SURGERY FOLLOW-UP PROGRESS NOTE  Chief Complaint: None        HPI:   Dear Xavier and colleagues:    It was nice to see Sudhir Diaz in follow up 4 weeks after surgery.  As you know, he is a 69 y.o. male with ischemic cardiomyopathy, coronary artery disease, and ischemic mitral incompetence who underwent CABG x 4, mitral valve repair and closure of left atrial appendage on 7/30/2024. He did well postoperatively and continues to do well. He comes in today complaining of nothing.  His activity level has been good.       Physical Exam:         /69 (BP Location: Left arm, Patient Position: Sitting, Cuff Size: Adult)   Pulse 57   Temp 97.7 °F (36.5 °C)   Resp 18   Ht 177.8 cm (70\")   Wt 79.4 kg (175 lb)   SpO2 98%   BMI 25.11 kg/m²   Heart:  regular rate and rhythm, S1, S2 normal, no murmur, click, rub or gallop, no murmurs  Lungs:  clear to auscultation bilaterally  Extremities:  no edema  Incision(s):  mid chest healing well, bilateral leg healing well, sternum stable    Assessment/Plan:     S/P CABG, mitral valve repair, and closure of left atrial appendage. Overall, he is doing well.    No significant post-op complications    Keep incisions clean and dry  OK to drive if not taking narcotic pain medicine  OK to begin cardiac rehab  Follow-up as scheduled with cardiology  Follow-up as scheduled with PCP  Follow-up with CT surgery prn    No restrictions of " activity.      Thank you for allowing me to participate in the care of your   patient.  Regards,  Rubén Sanon MD

## 2024-08-31 ENCOUNTER — HOME CARE VISIT (OUTPATIENT)
Dept: HOME HEALTH SERVICES | Facility: HOME HEALTHCARE | Age: 69
End: 2024-08-31
Payer: MEDICARE

## 2024-09-03 ENCOUNTER — ANTICOAGULATION VISIT (OUTPATIENT)
Dept: PHARMACY | Facility: HOSPITAL | Age: 69
End: 2024-09-03
Payer: COMMERCIAL

## 2024-09-03 ENCOUNTER — HOME CARE VISIT (OUTPATIENT)
Dept: HOME HEALTH SERVICES | Facility: HOME HEALTHCARE | Age: 69
End: 2024-09-03
Payer: MEDICARE

## 2024-09-03 VITALS
RESPIRATION RATE: 17 BRPM | HEART RATE: 74 BPM | DIASTOLIC BLOOD PRESSURE: 64 MMHG | TEMPERATURE: 98.3 F | SYSTOLIC BLOOD PRESSURE: 114 MMHG | OXYGEN SATURATION: 96 %

## 2024-09-03 DIAGNOSIS — Z95.1 S/P CABG (CORONARY ARTERY BYPASS GRAFT): Primary | ICD-10-CM

## 2024-09-03 DIAGNOSIS — I51.3 LEFT VENTRICULAR THROMBUS: ICD-10-CM

## 2024-09-03 LAB
HH POC INTERNATIONAL NORMALIZATION RATIO: 2.3
HH POC PROTIME: 23.8 SECONDS
INR PPP: 2.3 (ref 2–3)

## 2024-09-03 PROCEDURE — G0299 HHS/HOSPICE OF RN EA 15 MIN: HCPCS

## 2024-09-03 NOTE — PROGRESS NOTES
Anticoagulation Clinic Progress Note - Home Health INR Testing     INR Goal: 2 - 3  Today's INR: 2.3 (therapeutic). INR result was called in today by Yudith ([facility]).   Current warfarin dose: warfarin 2 mg PO daily, except warfarin 1 mg PO on Monday.  Current total weekly dose = 13 mg per week.     INR History:      Latest Ref Rng & Units 2024    12:00 AM 2024    12:00 AM 2024    10:37 AM 2024    12:00 AM 2024    10:45 AM 9/3/2024    12:00 AM 9/3/2024     9:52 AM   Anticoagulation Monitoring   INR  3.00  2.50  2.10  2.30   INR Date  2024  2024  2024  9/3/2024   INR Goal  2.0-3.0  2.0-3.0  2.0-3.0  2.0-3.0   Trend  Down  Same  Same  Same   Last Week Total  10 mg  13 mg  13 mg  13 mg   Next Week Total  13 mg  13 mg  13 mg  13 mg   Sun  2 mg  2 mg  2 mg  2 mg   Mon  1 mg  1 mg  1 mg  -   Tue  2 mg  2 mg  2 mg  2 mg   Wed  2 mg  2 mg  2 mg  2 mg   Thu  2 mg  2 mg  2 mg  2 mg   Fri  2 mg  2 mg  2 mg  2 mg   Sat  2 mg  2 mg  2 mg  2 mg   Historical INR 2.00 - 3.00 3.00     2.50      2.10      2.30            This result is from an external source.       Anticoagulation Summary  As of 9/3/2024      INR goal:  2.0-3.0   TTR:  100.0% (2.9 wk)   INR used for dosin.30 (9/3/2024)   Warfarin maintenance plan:  1 mg every Mon; 2 mg all other days   Weekly warfarin total:  13 mg   No change documented:  Kerry Feng, PharmD   Plan last modified:  Yudith Lima, PharmD (2024)   Next INR check:  2024   Target end date:      Indications    S/P CABG (coronary artery bypass graft) [Z95.1]  Left ventricular thrombus [I51.3]                 Anticoagulation Episode Summary       INR check location:      Preferred lab:      Send INR reminders to:  [facility] CLINICAL POOL    Comments:            Anticoagulation Care Providers       Provider Role Specialty Phone number    Saima Valles APRN Referring Cardiothoracic Surgery [phone]            Clinic Interview:    Patient Findings     Negatives:  Signs/symptoms of thrombosis, Signs/symptoms of bleeding,   Laboratory test error suspected, Change in health, Change in alcohol use,   Change in activity, Upcoming invasive procedure, Emergency department   visit, Upcoming dental procedure, Missed doses, Extra doses, Change in   medications, Change in diet/appetite, Hospital admission, Bruising, Other   complaints      Clinical Outcomes     Negatives:  Major bleeding event, Thromboembolic event,   Anticoagulation-related hospital admission, Anticoagulation-related ED   visit, Anticoagulation-related fatality        Current Medications:   Prior to Admission medications    Medication Sig Start Date End Date Taking? Authorizing Provider   Aspirin Low Dose 81 MG EC tablet Take 1 tablet by mouth Daily. 7/24/24   Jr Rubén Sanon MD   atorvastatin (LIPITOR) 40 MG tablet Take 1 tablet by mouth Every Night for 360 days. 8/4/24 7/30/25  Saima Valles APRN   escitalopram (LEXAPRO) 5 MG tablet Take 1 tablet by mouth Daily. 7/24/24   Jr Rubén Sanon MD   furosemide (LASIX) 40 MG tablet Take 1 tablet by mouth Daily for 30 days. 8/5/24 9/4/24  Saima Valles APRN   Melatonin 5 MG chewable tablet Chew 1 tablet every night at bedtime. Indications: sleep 8/5/24   Provider, MD Odalys   metoprolol succinate XL (TOPROL-XL) 25 MG 24 hr tablet Take 1 tablet by mouth Every 12 (Twelve) Hours for 90 days. 8/4/24 11/2/24  Saima Valles APRN   potassium chloride (KLOR-CON M20) 20 MEQ CR tablet Take 1 tablet by mouth Daily for 30 days. 8/5/24 9/4/24  Saima Valles APRN   warfarin (COUMADIN) 2 MG tablet 4mg daily titrate to INR.  Indications: Other - full anticoagulation, thrombus lv  Patient taking differently: Take 1 tablet by mouth Every Night. 1mg Mondays, 2mg all other days  Indications: Other - full anticoagulation, thrombus lv 8/4/24   Saima Valles APRN       Medical history:   Past Medical History:   Diagnosis Date     Anxiety     Arthritis     At risk for sleep apnea     5    Back pain     started 7/25/2024 in lower back happens sporadically  no noticable blood in urine  no c/o nausea/ vomiting    Cardiomyopathy     Coronary artery disease     Heart valve disease     mitral    Hyperlipidemia     Hypertension        Social history:   Social History     Tobacco Use    Smoking status: Former     Types: Cigarettes     Passive exposure: Past    Smokeless tobacco: Never    Tobacco comments:     Smoked 1 pack daily  for 15 years quit 1999   Substance Use Topics    Alcohol use: Yes     Comment: 1-2 monthly       Allergies:    Patient has no known allergies.      This patient is managed via a collaborative agreement, pursuant to IC 25-26-16. The signed protocol is kept in the [facility] at [address] [place] [address].    Education: Sudhir Diaz has been instructed to call if any changes in medications, doses, concerns, etc. Patient was provided dosing instructions and expressed verbal understanding and has no further questions at this time.    Plan:  1. no change; warfarin 2 mg PO daily, except warfarin 1 mg PO on Monday.  Total weekly dose = 13 mg.   2. INR should be tested in ~1 week in clinic.     No appointment for next INR made at this time. Patient being discharged from  services and will be starting cardiac rehab on 9/6. Patient unsure of what days of the week cardiac rehab will be. Instructed patient to contact [facility] on 9/9 once cardiac rehab schedule is set so that next INR check can be made in coordination with this.    Kerry Feng, PharmD  9/3/2024  09:53 EDT

## 2024-09-06 ENCOUNTER — OFFICE VISIT (OUTPATIENT)
Dept: CARDIAC REHAB | Facility: HOSPITAL | Age: 69
End: 2024-09-06
Payer: MEDICARE

## 2024-09-06 DIAGNOSIS — Z95.1 S/P CABG (CORONARY ARTERY BYPASS GRAFT): ICD-10-CM

## 2024-09-06 DIAGNOSIS — I25.5 ISCHEMIC CARDIOMYOPATHY: ICD-10-CM

## 2024-09-06 DIAGNOSIS — I34.0 NONRHEUMATIC MITRAL VALVE REGURGITATION: ICD-10-CM

## 2024-09-06 DIAGNOSIS — R07.89 CHEST DISCOMFORT: ICD-10-CM

## 2024-09-06 DIAGNOSIS — I10 ESSENTIAL HYPERTENSION: ICD-10-CM

## 2024-09-06 DIAGNOSIS — Z79.01 CHRONIC ANTICOAGULATION: ICD-10-CM

## 2024-09-06 DIAGNOSIS — Z95.1 HX OF CABG: ICD-10-CM

## 2024-09-06 DIAGNOSIS — I44.7 LBBB (LEFT BUNDLE BRANCH BLOCK): ICD-10-CM

## 2024-09-06 DIAGNOSIS — R06.02 SHORTNESS OF BREATH: ICD-10-CM

## 2024-09-06 PROCEDURE — 93798 PHYS/QHP OP CAR RHAB W/ECG: CPT

## 2024-09-09 ENCOUNTER — TREATMENT (OUTPATIENT)
Dept: CARDIAC REHAB | Facility: HOSPITAL | Age: 69
End: 2024-09-09
Payer: MEDICARE

## 2024-09-09 DIAGNOSIS — Z95.1 S/P CABG (CORONARY ARTERY BYPASS GRAFT): Primary | ICD-10-CM

## 2024-09-09 PROCEDURE — 93798 PHYS/QHP OP CAR RHAB W/ECG: CPT

## 2024-09-11 ENCOUNTER — ANTICOAGULATION VISIT (OUTPATIENT)
Dept: PHARMACY | Facility: HOSPITAL | Age: 69
End: 2024-09-11
Payer: MEDICARE

## 2024-09-11 ENCOUNTER — TREATMENT (OUTPATIENT)
Dept: CARDIAC REHAB | Facility: HOSPITAL | Age: 69
End: 2024-09-11
Payer: MEDICARE

## 2024-09-11 DIAGNOSIS — I51.3 LEFT VENTRICULAR THROMBUS: ICD-10-CM

## 2024-09-11 DIAGNOSIS — Z95.1 S/P CABG (CORONARY ARTERY BYPASS GRAFT): Primary | ICD-10-CM

## 2024-09-11 DIAGNOSIS — I34.0 NONRHEUMATIC MITRAL VALVE REGURGITATION: Primary | ICD-10-CM

## 2024-09-11 LAB
INR PPP: 2.1 (ref 0.9–1.1)
PROTHROMBIN TIME: 23.2 SECONDS

## 2024-09-11 PROCEDURE — G0463 HOSPITAL OUTPT CLINIC VISIT: HCPCS

## 2024-09-11 PROCEDURE — 93798 PHYS/QHP OP CAR RHAB W/ECG: CPT

## 2024-09-11 PROCEDURE — 36416 COLLJ CAPILLARY BLOOD SPEC: CPT

## 2024-09-11 PROCEDURE — 85610 PROTHROMBIN TIME: CPT

## 2024-09-11 NOTE — PROGRESS NOTES
Anticoagulation Clinic Progress Note    INR Goal: 2 - 3  Today's INR: 2.1 (therapeutic)  Current warfarin dose: warfarin 2 mg PO daily in past 7 days. Patient states he is pretty sure he did not cut 2 mg tablet in half on . Patient says that  nurse was previously filling pill planner for him so he was confused on the dosing of warfarin. Total weekly dose of past 7 days = 14 mg (8% unintentional increase).     Patient started cardiac rehab late last week and increased activity can cause decrease in INR.    INR History:      Latest Ref Rng & Units 2024    12:00 AM 2024    10:37 AM 2024    12:00 AM 2024    10:45 AM 9/3/2024    12:00 AM 9/3/2024     9:52 AM 2024    10:00 AM   Anticoagulation Monitoring   INR   2.50  2.10  2.30 2.10   INR Date   2024  2024  9/3/2024 2024   INR Goal   2.0-3.0  2.0-3.0  2.0-3.0 2.0-3.0   Trend   Same  Same  Same Up   Last Week Total   13 mg  13 mg  13 mg 14 mg   Next Week Total   13 mg  13 mg  13 mg 14 mg   Sun   2 mg  2 mg  2 mg 2 mg   Mon   1 mg  1 mg  - 2 mg   Tue   2 mg  2 mg  2 mg 2 mg   Wed   2 mg  2 mg  2 mg 2 mg   Thu   2 mg  2 mg  2 mg 2 mg   Fri   2 mg  2 mg  2 mg 2 mg   Sat   2 mg  2 mg  2 mg 2 mg   Historical INR 2.00 - 3.00 2.50      2.10      2.30             This result is from an external source.       Anticoagulation Summary  As of 2024      INR goal:  2.0-3.0   TTR:  100.0% (4 wk)   INR used for dosin.10 (2024)   Warfarin maintenance plan:  2 mg every day   Weekly warfarin total:  14 mg   Plan last modified:  Kerry Feng, PharmD (2024)   Next INR check:  2024   Target end date:      Indications    S/P CABG (coronary artery bypass graft) [Z95.1]  Left ventricular thrombus [I51.3]                 Anticoagulation Episode Summary       INR check location:      Preferred lab:      Send INR reminders to:  [facility] CLINICAL POOL    Comments:            Anticoagulation Care Providers        Provider Role Specialty Phone number    Saima Valles APRN Referring Cardiothoracic Surgery [phone]            Clinic Interview:   Patient Findings     Positives:  Change in activity (Started cardiac rehab last week   (increased activity may decrease INR)), Extra doses (Took 2 mg dose on 9/9   instead of 1 mg dose due to confusion)    Negatives:  Signs/symptoms of thrombosis, Signs/symptoms of bleeding,   Laboratory test error suspected, Change in health, Change in alcohol use,   Upcoming invasive procedure, Emergency department visit, Upcoming dental   procedure, Missed doses, Change in medications, Change in diet/appetite,   Hospital admission, Bruising, Other complaints      Clinical Outcomes     Negatives:  Major bleeding event, Thromboembolic event,   Anticoagulation-related hospital admission, Anticoagulation-related ED   visit, Anticoagulation-related fatality        Current Medications:   Prior to Admission medications    Medication Sig Start Date End Date Taking? Authorizing Provider   Aspirin Low Dose 81 MG EC tablet Take 1 tablet by mouth Daily. 7/24/24   Jr Rubén Sanon MD   atorvastatin (LIPITOR) 40 MG tablet Take 1 tablet by mouth Every Night for 360 days. 8/4/24 7/30/25  Saima Valles APRN   escitalopram (LEXAPRO) 5 MG tablet Take 1 tablet by mouth Daily.  Patient taking differently: Take 2 tablets by mouth Daily. Indications: Major Depressive Disorder 7/24/24   Jr Rubén Sanon MD   furosemide (LASIX) 40 MG tablet Take 1 tablet by mouth Daily for 30 days. 8/5/24 9/4/24  Saima Valles APRN   Melatonin 5 MG chewable tablet Chew 1 tablet every night at bedtime. Indications: sleep 8/5/24   Provider, MD Odalys   metoprolol succinate XL (TOPROL-XL) 25 MG 24 hr tablet Take 1 tablet by mouth Every 12 (Twelve) Hours for 90 days. 8/4/24 11/2/24  Saima Valles APRN   warfarin (COUMADIN) 2 MG tablet 4mg daily titrate to INR.  Indications: Other - full anticoagulation,  thrombus lv  Patient taking differently: Take 1 tablet by mouth Every Night. 1mg Mondays, 2mg all other days  Indications: Other - full anticoagulation, thrombus lv 8/4/24   Saima Valles APRN       Medical history:   Past Medical History:   Diagnosis Date    Anxiety     Arthritis     At risk for sleep apnea     5    Back pain     started 7/25/2024 in lower back happens sporadically  no noticable blood in urine  no c/o nausea/ vomiting    Cardiomyopathy     Coronary artery disease     Heart valve disease     mitral    Hyperlipidemia     Hypertension        Social history:   Social History     Tobacco Use    Smoking status: Former     Types: Cigarettes     Passive exposure: Past    Smokeless tobacco: Never    Tobacco comments:     Smoked 1 pack daily  for 15 years quit 1999   Substance Use Topics    Alcohol use: Yes     Comment: 1-2 monthly       Allergies:    Patient has no known allergies.    Vaccine History:   Immunization History   Administered Date(s) Administered    COVID-19 (MODERNA) Monovalent Original Booster 10/28/2021    DTaP 10/16/2012    Fluad Quad 65+ 10/12/2021    Fluzone  >6mos 09/22/2020    Fluzone High-Dose 65+YRS 09/20/2018    Hepatitis A 06/24/2019    Influenza Split Preservative Free ID 10/16/2012, 01/27/2014    Pneumococcal Conjugate 13-Valent (PCV13) 03/19/2018    Pneumococcal Polysaccharide (PPSV23) 01/26/1999, 10/16/2012    Td, Not Adsorbed 01/26/1999    Tdap 02/09/2013    Zostavax 11/07/2014       This patient is managed via a collaborative agreement, pursuant to IC [id number]. The signed protocol is kept in the [facility] at [address] [place] [address].    Education: Sudhir Diaz has been instructed to call if any changes in medications, doses, concerns, etc. Patient was provided dosing instructions and expressed verbal understanding and has no further questions at this time.    Plan:  1. Anticoagulation   - Continue total weekly dose of past 7 days;  warfarin 2 mg PO daily. Total weekly dose = 14 mg.   - RTC in 2 week(s)      Charu CramerD  9/11/2024  10:29 EDT

## 2024-09-13 ENCOUNTER — TREATMENT (OUTPATIENT)
Dept: CARDIAC REHAB | Facility: HOSPITAL | Age: 69
End: 2024-09-13
Payer: MEDICARE

## 2024-09-13 DIAGNOSIS — Z95.1 S/P CABG (CORONARY ARTERY BYPASS GRAFT): Primary | ICD-10-CM

## 2024-09-13 PROCEDURE — 93798 PHYS/QHP OP CAR RHAB W/ECG: CPT

## 2024-09-16 ENCOUNTER — TREATMENT (OUTPATIENT)
Dept: CARDIAC REHAB | Facility: HOSPITAL | Age: 69
End: 2024-09-16
Payer: MEDICARE

## 2024-09-16 DIAGNOSIS — Z95.1 S/P CABG (CORONARY ARTERY BYPASS GRAFT): Primary | ICD-10-CM

## 2024-09-16 PROCEDURE — 93798 PHYS/QHP OP CAR RHAB W/ECG: CPT

## 2024-09-18 ENCOUNTER — TREATMENT (OUTPATIENT)
Dept: CARDIAC REHAB | Facility: HOSPITAL | Age: 69
End: 2024-09-18
Payer: MEDICARE

## 2024-09-18 DIAGNOSIS — Z95.1 S/P CABG (CORONARY ARTERY BYPASS GRAFT): Primary | ICD-10-CM

## 2024-09-18 PROCEDURE — 93798 PHYS/QHP OP CAR RHAB W/ECG: CPT

## 2024-09-20 ENCOUNTER — TREATMENT (OUTPATIENT)
Dept: CARDIAC REHAB | Facility: HOSPITAL | Age: 69
End: 2024-09-20
Payer: MEDICARE

## 2024-09-20 ENCOUNTER — TELEPHONE (OUTPATIENT)
Dept: CARDIOLOGY | Facility: CLINIC | Age: 69
End: 2024-09-20
Payer: COMMERCIAL

## 2024-09-20 DIAGNOSIS — Z95.1 S/P CABG (CORONARY ARTERY BYPASS GRAFT): Primary | ICD-10-CM

## 2024-09-20 PROCEDURE — 93798 PHYS/QHP OP CAR RHAB W/ECG: CPT

## 2024-09-20 RX ORDER — METOPROLOL SUCCINATE 25 MG/1
50 TABLET, EXTENDED RELEASE ORAL EVERY 12 HOURS SCHEDULED
Qty: 120 TABLET | Refills: 0 | Status: SHIPPED | OUTPATIENT
Start: 2024-09-20 | End: 2024-12-19

## 2024-09-23 ENCOUNTER — APPOINTMENT (OUTPATIENT)
Dept: GENERAL RADIOLOGY | Facility: HOSPITAL | Age: 69
End: 2024-09-23
Payer: MEDICARE

## 2024-09-23 ENCOUNTER — APPOINTMENT (OUTPATIENT)
Dept: CT IMAGING | Facility: HOSPITAL | Age: 69
End: 2024-09-23
Payer: MEDICARE

## 2024-09-23 ENCOUNTER — HOSPITAL ENCOUNTER (EMERGENCY)
Facility: HOSPITAL | Age: 69
Discharge: HOME | End: 2024-09-23
Attending: EMERGENCY MEDICINE | Admitting: EMERGENCY MEDICINE
Payer: MEDICARE

## 2024-09-23 ENCOUNTER — TREATMENT (OUTPATIENT)
Dept: CARDIAC REHAB | Facility: HOSPITAL | Age: 69
End: 2024-09-23
Payer: MEDICARE

## 2024-09-23 VITALS
DIASTOLIC BLOOD PRESSURE: 90 MMHG | OXYGEN SATURATION: 99 % | HEIGHT: 70 IN | BODY MASS INDEX: 25.91 KG/M2 | WEIGHT: 181 LBS | SYSTOLIC BLOOD PRESSURE: 182 MMHG | TEMPERATURE: 96.9 F | RESPIRATION RATE: 16 BRPM | HEART RATE: 56 BPM

## 2024-09-23 DIAGNOSIS — Z95.1 S/P CABG (CORONARY ARTERY BYPASS GRAFT): Primary | ICD-10-CM

## 2024-09-23 DIAGNOSIS — J90 PLEURAL EFFUSION: ICD-10-CM

## 2024-09-23 DIAGNOSIS — R79.89 ELEVATED LFTS: ICD-10-CM

## 2024-09-23 DIAGNOSIS — I10 UNCONTROLLED HYPERTENSION: Primary | ICD-10-CM

## 2024-09-23 LAB
ALBUMIN SERPL-MCNC: 3.2 G/DL (ref 3.5–5.2)
ALBUMIN/GLOB SERPL: 1 G/DL
ALP SERPL-CCNC: 510 U/L (ref 39–117)
ALT SERPL W P-5'-P-CCNC: 275 U/L (ref 1–41)
ANION GAP SERPL CALCULATED.3IONS-SCNC: 7.7 MMOL/L (ref 5–15)
AST SERPL-CCNC: 171 U/L (ref 1–40)
BASOPHILS # BLD AUTO: 0.05 10*3/MM3 (ref 0–0.2)
BASOPHILS NFR BLD AUTO: 1.2 % (ref 0–1.5)
BILIRUB SERPL-MCNC: 0.5 MG/DL (ref 0–1.2)
BUN SERPL-MCNC: 12 MG/DL (ref 8–23)
BUN/CREAT SERPL: 13.5 (ref 7–25)
CALCIUM SPEC-SCNC: 8.8 MG/DL (ref 8.6–10.5)
CHLORIDE SERPL-SCNC: 106 MMOL/L (ref 98–107)
CO2 SERPL-SCNC: 26.3 MMOL/L (ref 22–29)
CREAT SERPL-MCNC: 0.89 MG/DL (ref 0.76–1.27)
DEPRECATED RDW RBC AUTO: 44 FL (ref 37–54)
EGFRCR SERPLBLD CKD-EPI 2021: 91.6 ML/MIN/1.73
EOSINOPHIL # BLD AUTO: 0.18 10*3/MM3 (ref 0–0.4)
EOSINOPHIL NFR BLD AUTO: 4.2 % (ref 0.3–6.2)
ERYTHROCYTE [DISTWIDTH] IN BLOOD BY AUTOMATED COUNT: 14.2 % (ref 12.3–15.4)
GLOBULIN UR ELPH-MCNC: 3.2 GM/DL
GLUCOSE SERPL-MCNC: 128 MG/DL (ref 65–99)
HCT VFR BLD AUTO: 31.9 % (ref 37.5–51)
HGB BLD-MCNC: 10 G/DL (ref 13–17.7)
IMM GRANULOCYTES # BLD AUTO: 0.02 10*3/MM3 (ref 0–0.05)
IMM GRANULOCYTES NFR BLD AUTO: 0.5 % (ref 0–0.5)
INR PPP: 1.51 (ref 0.9–1.1)
LYMPHOCYTES # BLD AUTO: 1.22 10*3/MM3 (ref 0.7–3.1)
LYMPHOCYTES NFR BLD AUTO: 28.6 % (ref 19.6–45.3)
MCH RBC QN AUTO: 26.7 PG (ref 26.6–33)
MCHC RBC AUTO-ENTMCNC: 31.3 G/DL (ref 31.5–35.7)
MCV RBC AUTO: 85.1 FL (ref 79–97)
MONOCYTES # BLD AUTO: 0.39 10*3/MM3 (ref 0.1–0.9)
MONOCYTES NFR BLD AUTO: 9.1 % (ref 5–12)
NEUTROPHILS NFR BLD AUTO: 2.41 10*3/MM3 (ref 1.7–7)
NEUTROPHILS NFR BLD AUTO: 56.4 % (ref 42.7–76)
NRBC BLD AUTO-RTO: 0 /100 WBC (ref 0–0.2)
NT-PROBNP SERPL-MCNC: 1320 PG/ML (ref 0–900)
PLATELET # BLD AUTO: 243 10*3/MM3 (ref 140–450)
PMV BLD AUTO: 9 FL (ref 6–12)
POTASSIUM SERPL-SCNC: 3.4 MMOL/L (ref 3.5–5.2)
PROT SERPL-MCNC: 6.4 G/DL (ref 6–8.5)
PROTHROMBIN TIME: 18.4 SECONDS (ref 11.7–14.2)
QT INTERVAL: 455 MS
QTC INTERVAL: 432 MS
RBC # BLD AUTO: 3.75 10*6/MM3 (ref 4.14–5.8)
SODIUM SERPL-SCNC: 140 MMOL/L (ref 136–145)
TROPONIN T SERPL HS-MCNC: 20 NG/L
WBC NRBC COR # BLD AUTO: 4.27 10*3/MM3 (ref 3.4–10.8)

## 2024-09-23 PROCEDURE — 25010000002 FUROSEMIDE PER 20 MG: Performed by: PHYSICIAN ASSISTANT

## 2024-09-23 PROCEDURE — 25510000001 IOPAMIDOL 61 % SOLUTION: Performed by: EMERGENCY MEDICINE

## 2024-09-23 PROCEDURE — 71045 X-RAY EXAM CHEST 1 VIEW: CPT

## 2024-09-23 PROCEDURE — 99285 EMERGENCY DEPT VISIT HI MDM: CPT

## 2024-09-23 PROCEDURE — 80053 COMPREHEN METABOLIC PANEL: CPT | Performed by: PHYSICIAN ASSISTANT

## 2024-09-23 PROCEDURE — 85025 COMPLETE CBC W/AUTO DIFF WBC: CPT | Performed by: PHYSICIAN ASSISTANT

## 2024-09-23 PROCEDURE — 74177 CT ABD & PELVIS W/CONTRAST: CPT

## 2024-09-23 PROCEDURE — 93010 ELECTROCARDIOGRAM REPORT: CPT | Performed by: INTERNAL MEDICINE

## 2024-09-23 PROCEDURE — 96374 THER/PROPH/DIAG INJ IV PUSH: CPT

## 2024-09-23 PROCEDURE — 93798 PHYS/QHP OP CAR RHAB W/ECG: CPT

## 2024-09-23 PROCEDURE — 83880 ASSAY OF NATRIURETIC PEPTIDE: CPT | Performed by: PHYSICIAN ASSISTANT

## 2024-09-23 PROCEDURE — 85610 PROTHROMBIN TIME: CPT | Performed by: PHYSICIAN ASSISTANT

## 2024-09-23 PROCEDURE — 84484 ASSAY OF TROPONIN QUANT: CPT | Performed by: PHYSICIAN ASSISTANT

## 2024-09-23 PROCEDURE — 93005 ELECTROCARDIOGRAM TRACING: CPT | Performed by: PHYSICIAN ASSISTANT

## 2024-09-23 RX ORDER — FUROSEMIDE 10 MG/ML
40 INJECTION INTRAMUSCULAR; INTRAVENOUS ONCE
Status: COMPLETED | OUTPATIENT
Start: 2024-09-23 | End: 2024-09-23

## 2024-09-23 RX ORDER — FUROSEMIDE 40 MG
40 TABLET ORAL DAILY
Qty: 7 TABLET | Refills: 0 | Status: SHIPPED | OUTPATIENT
Start: 2024-09-23 | End: 2024-09-30

## 2024-09-23 RX ORDER — IOPAMIDOL 612 MG/ML
100 INJECTION, SOLUTION INTRAVASCULAR
Status: COMPLETED | OUTPATIENT
Start: 2024-09-23 | End: 2024-09-23

## 2024-09-23 RX ADMIN — IOPAMIDOL 85 ML: 612 INJECTION, SOLUTION INTRAVENOUS at 18:43

## 2024-09-23 RX ADMIN — FUROSEMIDE 40 MG: 10 INJECTION, SOLUTION INTRAMUSCULAR; INTRAVENOUS at 19:51

## 2024-09-25 ENCOUNTER — ANTICOAGULATION VISIT (OUTPATIENT)
Dept: PHARMACY | Facility: HOSPITAL | Age: 69
End: 2024-09-25
Payer: MEDICARE

## 2024-09-25 ENCOUNTER — TREATMENT (OUTPATIENT)
Dept: CARDIAC REHAB | Facility: HOSPITAL | Age: 69
End: 2024-09-25
Payer: MEDICARE

## 2024-09-25 DIAGNOSIS — Z95.1 S/P CABG (CORONARY ARTERY BYPASS GRAFT): Primary | ICD-10-CM

## 2024-09-25 DIAGNOSIS — I51.3 LEFT VENTRICULAR THROMBUS: ICD-10-CM

## 2024-09-25 LAB
INR PPP: 1.7 (ref 0.9–1.1)
PROTHROMBIN TIME: 19.1 SECONDS

## 2024-09-25 PROCEDURE — 36416 COLLJ CAPILLARY BLOOD SPEC: CPT

## 2024-09-25 PROCEDURE — G0463 HOSPITAL OUTPT CLINIC VISIT: HCPCS

## 2024-09-25 PROCEDURE — 85610 PROTHROMBIN TIME: CPT

## 2024-09-25 PROCEDURE — 93798 PHYS/QHP OP CAR RHAB W/ECG: CPT

## 2024-09-27 ENCOUNTER — TREATMENT (OUTPATIENT)
Dept: CARDIAC REHAB | Facility: HOSPITAL | Age: 69
End: 2024-09-27
Payer: MEDICARE

## 2024-09-27 DIAGNOSIS — Z95.1 S/P CABG (CORONARY ARTERY BYPASS GRAFT): Primary | ICD-10-CM

## 2024-09-27 PROCEDURE — 93798 PHYS/QHP OP CAR RHAB W/ECG: CPT

## 2024-09-30 ENCOUNTER — TREATMENT (OUTPATIENT)
Dept: CARDIAC REHAB | Facility: HOSPITAL | Age: 69
End: 2024-09-30
Payer: MEDICARE

## 2024-09-30 ENCOUNTER — OFFICE VISIT (OUTPATIENT)
Dept: CARDIOLOGY | Facility: CLINIC | Age: 69
End: 2024-09-30
Payer: MEDICARE

## 2024-09-30 VITALS
SYSTOLIC BLOOD PRESSURE: 100 MMHG | BODY MASS INDEX: 25.34 KG/M2 | WEIGHT: 177 LBS | HEIGHT: 70 IN | OXYGEN SATURATION: 95 % | DIASTOLIC BLOOD PRESSURE: 51 MMHG | HEART RATE: 61 BPM

## 2024-09-30 DIAGNOSIS — I25.5 ISCHEMIC CARDIOMYOPATHY: ICD-10-CM

## 2024-09-30 DIAGNOSIS — I10 ESSENTIAL HYPERTENSION: ICD-10-CM

## 2024-09-30 DIAGNOSIS — R06.02 SHORTNESS OF BREATH: ICD-10-CM

## 2024-09-30 DIAGNOSIS — I34.0 NONRHEUMATIC MITRAL VALVE REGURGITATION: ICD-10-CM

## 2024-09-30 DIAGNOSIS — I44.7 LBBB (LEFT BUNDLE BRANCH BLOCK): ICD-10-CM

## 2024-09-30 DIAGNOSIS — I51.3 LEFT VENTRICULAR THROMBUS: ICD-10-CM

## 2024-09-30 DIAGNOSIS — Z95.1 S/P CABG (CORONARY ARTERY BYPASS GRAFT): Primary | ICD-10-CM

## 2024-09-30 DIAGNOSIS — R07.89 CHEST DISCOMFORT: ICD-10-CM

## 2024-09-30 PROCEDURE — 99214 OFFICE O/P EST MOD 30 MIN: CPT | Performed by: INTERNAL MEDICINE

## 2024-09-30 PROCEDURE — 3078F DIAST BP <80 MM HG: CPT | Performed by: INTERNAL MEDICINE

## 2024-09-30 PROCEDURE — 3074F SYST BP LT 130 MM HG: CPT | Performed by: INTERNAL MEDICINE

## 2024-09-30 PROCEDURE — 93798 PHYS/QHP OP CAR RHAB W/ECG: CPT

## 2024-09-30 RX ORDER — FUROSEMIDE 40 MG
40 TABLET ORAL DAILY
Qty: 90 TABLET | Refills: 3 | Status: SHIPPED | OUTPATIENT
Start: 2024-09-30

## 2024-09-30 RX ORDER — POTASSIUM CHLORIDE 750 MG/1
10 TABLET, EXTENDED RELEASE ORAL DAILY
Qty: 90 TABLET | Refills: 3 | Status: SHIPPED | OUTPATIENT
Start: 2024-09-30

## 2024-09-30 NOTE — PROGRESS NOTES
Encounter Date:09/30/2024  Last seen 8/20/2024      Patient ID: Sudhir Diaz is a 69 y.o. male.    Chief Complaint:  Chest discomfort  Shortness of breath  Left bundle branch block  Hypertension  Dyslipidemia     History of present illness  Recently patient went to [facility] with increased blood pressure.  Patient was started on Lasix by ER physician 40 mg a day.  Patient is not on potassium supplements.    Since I have last seen, the patient has been without any chest discomfort ,shortness of breath, palpitations, dizziness or syncope.  Denies having any headache ,abdominal pain ,nausea, vomiting , diarrhea constipation, loss of weight or loss of appetite.  Denies having any excessive bruising ,hematuria or blood in the stool.    Review of all systems negative except as indicated.    Reviewed ROS.  Assessment and Plan      ]]]]]]]]]]]]]]]]]]]  History  ========  -Status post CABG x 4.  Mitral valve repair, closure of left atrial appendage internally-7/30/2024-[facility]-Dr. Sanon  Skeletonized LIMA to LAD.  Vein graft to PDA.  Vein graft to LV branch 1.  T graft BAL graft to LV branch 2.  Mitral valve repair with a 28 mm Medtronic 3D ring.  Closure of left atrial appendage internally.     - Left bundle branch block-new since 8/1/2020.     - Cardiomyopathy-ischemic versus nonischemic.  Ejection fraction 40%-cath-6/19/2024  Ejection fraction 20 to 25%-echo-6/11/2024     - Left ventricular apical thrombus (seen on WADE intraoperatively)     - Anticoagulation-on Coumadin     Cardiac catheterization 6/19/2024   No evidence for pulmonary hypertension is seen.  Left ventricle is normal in size with inferior wall and apical hypokinesis with estimated ejection fraction of 40%.  No mitral regurgitation was seen.  Left main coronary artery is normal.  Left anterior descending artery has mid segment 80% disease.  Distal vessel is small in caliber.  Circumflex coronary artery is a small caliber vessel  that has 99% disease in the midsegment.  Right coronary artery is a large and dominant vessel that has multiple areas of 95 to 99% disease.      Stress Cardiolite test 6/11/2024   Lexiscan Cardiolite test is abnormal with inferior ischemia.  Gated SPECT images revealed left ventricular enlargement with diffuse hypocontractility with ejection fraction of 36%.     Echocardiogram 6/11/2024  Moderate mitral mild tricuspid and pulmonic regurgitation is present.  Left atrial enlargement..  Left ventricle is enlarged with paradoxical septal motion (left bundle branch block) and severe and diffuse hypocontractility with ejection fraction of 20 to 25%.  Grade 1 left ventricular diastolic dysfunction is present.  Right ventricle is normal in size with hypocontractility.  TAPSE 1.3 cm..  Mild pulmonary hypertension.     - Hypertension dyslipidemia     - Large right groin lipoma.     - Status post cholecystectomy shoulder surgery and carpal tunnel surgery.     - Family history negative for coronary artery disease     - Former smoker     - No known allergies  ===========  Plan  ==========  Patient had CABG left atrial appendage closure and mitral valve repair at [facility] by Dr. Sanon on 7/30/2024.     Patient's postoperative course was essentially uneventful.  Patient was found to have LV thrombus with IntraOp WADE and patient was discharged home on warfarin.  Home health is following INR.  Recent INR was 2.5.  (8/19/2024)     Ischemic cardiomyopathy.  Left ventricular thrombus.     Anticoagulation  Patient is on Coumadin.  INR on 8/19/2020 24-2.5  INR 1.7-9 25 2024     Left bundle branch block-new since 8/1/2020.  EKG 8/20/2024-sinus rhythm without left bundle branch block      Hypertension  Blood pressure 100/50    Dyslipidemia-patient is on Lipitor     Patient is going through rehab.    Follow-up in the office in 3 months with an echocardiogram to assess left ventricular function and evaluate left ventricular  thrombus.  Duration of anticoagulation therapy depends on the findings.    Patient is on Lasix 40 mg a day.  Potassium 3.4-9 23 2024.  Start potassium supplements.     Further plan will depend on patient's progress.     Reviewed and updated 9/30/2024.  ]]]]]]]]]]]]]]]]]]]        No diagnosis found.LAB RESULTS (LAST 7 DAYS)    CBC  Results from last 7 days   Lab Units 09/23/24  1703   WBC 10*3/mm3 4.27   RBC 10*6/mm3 3.75*   HEMOGLOBIN g/dL 10.0*   HEMATOCRIT % 31.9*   MCV fL 85.1   PLATELETS 10*3/mm3 243       BMP  Results from last 7 days   Lab Units 09/23/24  1703   SODIUM mmol/L 140   POTASSIUM mmol/L 3.4*   CHLORIDE mmol/L 106   CO2 mmol/L 26.3   BUN mg/dL 12   CREATININE mg/dL 0.89   GLUCOSE mg/dL 128*       CMP   Results from last 7 days   Lab Units 09/23/24  1703   SODIUM mmol/L 140   POTASSIUM mmol/L 3.4*   CHLORIDE mmol/L 106   CO2 mmol/L 26.3   BUN mg/dL 12   CREATININE mg/dL 0.89   GLUCOSE mg/dL 128*   ALBUMIN g/dL 3.2*   BILIRUBIN mg/dL 0.5   ALK PHOS U/L 510*   AST (SGOT) U/L 171*   ALT (SGPT) U/L 275*         BNP        TROPONIN  Results from last 7 days   Lab Units 09/23/24  1703   HSTROP T ng/L 20       CoAg  Results from last 7 days   Lab Units 09/25/24  0840 09/23/24  1703   INR  1.70* 1.51*       Creatinine Clearance  Estimated Creatinine Clearance: 86.5 mL/min (by C-G formula based on SCr of 0.89 mg/dL).    ABG        Radiology  No radiology results for the last day                The following portions of the patient's history were reviewed and updated as appropriate: allergies, current medications, past family history, past medical history, past social history, past surgical history, and problem list.    Review of Systems   Constitutional: Negative for malaise/fatigue.   Cardiovascular:  Negative for chest pain, dyspnea on exertion, leg swelling and palpitations.   Respiratory:  Negative for cough and shortness of breath.    Gastrointestinal:  Negative for abdominal pain, nausea and vomiting.    Neurological:  Negative for dizziness, focal weakness, headaches, light-headedness and numbness.   All other systems reviewed and are negative.      Current Outpatient Medications:     Aspirin Low Dose 81 MG EC tablet, Take 1 tablet by mouth Daily., Disp: 90 tablet, Rfl: 5    atorvastatin (LIPITOR) 40 MG tablet, Take 1 tablet by mouth Every Night for 360 days., Disp: 90 tablet, Rfl: 3    escitalopram (LEXAPRO) 10 MG tablet, Take 1 tablet by mouth Daily., Disp: , Rfl:     furosemide (LASIX) 40 MG tablet, Take 1 tablet by mouth Daily for 7 days., Disp: 7 tablet, Rfl: 0    Melatonin 5 MG chewable tablet, Chew 2 tablets every night at bedtime. Indications: sleep, Disp: , Rfl:     metoprolol succinate XL (TOPROL-XL) 25 MG 24 hr tablet, Take 2 tablets by mouth Every 12 (Twelve) Hours for 90 days. Indications: High Blood Pressure Disorder, Disp: 120 tablet, Rfl: 0    furosemide (LASIX) 40 MG tablet, Take 1 tablet by mouth Daily for 30 days., Disp: 30 tablet, Rfl: 0    warfarin (COUMADIN) 2 MG tablet, 4mg daily titrate to INR.  Indications: Other - full anticoagulation, thrombus lv (Patient taking differently: Take 1 tablet by mouth Every Night. 1mg Mondays, 2mg all other days  Indications: Other - full anticoagulation, thrombus lv), Disp: 60 tablet, Rfl: 2  No current facility-administered medications for this visit.    Facility-Administered Medications Ordered in Other Visits:     Chlorhexidine Gluconate Cloth 2 % pads 1 Application, 1 Application, Topical, Q12H PRN, Carlos Manuel Colón, EMILIA    No Known Allergies    Family History   Problem Relation Age of Onset    Malig Hyperthermia Neg Hx        Past Surgical History:   Procedure Laterality Date    CARDIAC CATHETERIZATION N/A 06/19/2024    Procedure: Left and Right Heart Cath with Coronary Angiography;  Surgeon: Peg Pettit MD;  Location: [facility] CATH INVASIVE LOC[facility];  Service: Cardiovascular;  Laterality: N/A;    CARDIAC SURGERY      CARPAL TUNNEL RELEASE Bilateral  "    CHOLECYSTECTOMY      COLONOSCOPY      CORONARY ARTERY BYPASS GRAFT WITH MITRAL VALVE REPAIR/REPLACEMENT N/A 2024    Procedure: STERNOTOMY, CORONARY ARTERY BYPASS TIMES FOUR  WITH LEFT INTERNAL MAMMARY ARTERY GRAFT AND LEFT SAPHENOUS  ENDOSCOPIC HARVESTED VEIN GRAFT; MITRAL VALVE REPAIR; TRANSESOPHAGEAL ECHOCARDIOGRAM WITH ANESTHESIA, PRP.;  Surgeon: Jr Rubén Sanno MD;  Location: [facility];  Service: Cardiothoracic;  Laterality: N/A;    SHOULDER SURGERY Right        Past Medical History:   Diagnosis Date    Anxiety     Arthritis     At risk for sleep apnea     5    Back pain     started 2024 in lower back happens sporadically  no noticable blood in urine  no c/o nausea/ vomiting    Cardiomyopathy     Coronary artery disease     Heart valve disease     mitral    Hyperlipidemia     Hypertension        Family History   Problem Relation Age of Onset    Malig Hyperthermia Neg Hx        Social History     Socioeconomic History    Marital status:    Tobacco Use    Smoking status: Former     Current packs/day: 0.00     Average packs/day: 1.5 packs/day for 28.9 years (43.4 ttl pk-yrs)     Types: Cigarettes     Start date:      Quit date: 1999     Years since quittin.8     Passive exposure: Past    Smokeless tobacco: Never    Tobacco comments:     Smoked 1 pack daily  for 15 years quit    Vaping Use    Vaping status: Never Used   Substance and Sexual Activity    Alcohol use: Yes     Comment: 1-2 monthly    Drug use: Never    Sexual activity: Defer         Procedures      Objective:       Physical Exam    Ht 177.8 cm (70\")   Wt 80.3 kg (177 lb)   BMI 25.40 kg/m²   The patient is alert, oriented and in no distress.    Vital signs as noted above.    Head and neck revealed no carotid bruits or jugular venous distension.  No thyromegaly or lymphadenopathy is present.    Lungs clear.  No wheezing.  Breath sounds are normal bilaterally.    Heart normal first and second heart sounds.  " No murmur..  No pericardial rub is present.  No gallop is present.    Abdomen soft and nontender.  No organomegaly is present.    Extremities revealed good peripheral pulses without any pedal edema.    Skin warm and dry.    Musculoskeletal system is grossly normal.    CNS grossly normal.    Reviewed and updated.

## 2024-10-01 ENCOUNTER — TELEPHONE (OUTPATIENT)
Dept: PHARMACY | Facility: HOSPITAL | Age: 69
End: 2024-10-01
Payer: COMMERCIAL

## 2024-10-01 NOTE — TELEPHONE ENCOUNTER
Per ARIELLA Chopra at Dr. Pettit's office, Dr. Pettit is okay with [facility] managing patient's anticoagulation under existing collaborative care agreement. Will update patient's referring provider on anticoagulation episode.    Charu CramerD, BCPS, BCACP  10/01/24 14:44 EDT

## 2024-10-02 ENCOUNTER — TREATMENT (OUTPATIENT)
Dept: CARDIAC REHAB | Facility: HOSPITAL | Age: 69
End: 2024-10-02
Payer: MEDICARE

## 2024-10-02 DIAGNOSIS — Z95.1 S/P CABG (CORONARY ARTERY BYPASS GRAFT): Primary | ICD-10-CM

## 2024-10-02 PROCEDURE — 93798 PHYS/QHP OP CAR RHAB W/ECG: CPT

## 2024-10-04 ENCOUNTER — TREATMENT (OUTPATIENT)
Dept: CARDIAC REHAB | Facility: HOSPITAL | Age: 69
End: 2024-10-04
Payer: MEDICARE

## 2024-10-04 DIAGNOSIS — Z95.1 S/P CABG (CORONARY ARTERY BYPASS GRAFT): Primary | ICD-10-CM

## 2024-10-04 PROCEDURE — 93798 PHYS/QHP OP CAR RHAB W/ECG: CPT

## 2024-10-07 ENCOUNTER — TREATMENT (OUTPATIENT)
Dept: CARDIAC REHAB | Facility: HOSPITAL | Age: 69
End: 2024-10-07
Payer: MEDICARE

## 2024-10-07 DIAGNOSIS — Z95.1 S/P CABG (CORONARY ARTERY BYPASS GRAFT): Primary | ICD-10-CM

## 2024-10-07 PROCEDURE — 93798 PHYS/QHP OP CAR RHAB W/ECG: CPT

## 2024-10-09 ENCOUNTER — APPOINTMENT (OUTPATIENT)
Dept: CARDIAC REHAB | Facility: HOSPITAL | Age: 69
End: 2024-10-09
Payer: COMMERCIAL

## 2024-10-11 ENCOUNTER — TREATMENT (OUTPATIENT)
Dept: CARDIAC REHAB | Facility: HOSPITAL | Age: 69
End: 2024-10-11
Payer: MEDICARE

## 2024-10-11 ENCOUNTER — ANTICOAGULATION VISIT (OUTPATIENT)
Dept: PHARMACY | Facility: HOSPITAL | Age: 69
End: 2024-10-11
Payer: MEDICARE

## 2024-10-11 DIAGNOSIS — Z95.1 S/P CABG (CORONARY ARTERY BYPASS GRAFT): Primary | ICD-10-CM

## 2024-10-11 DIAGNOSIS — I51.3 LEFT VENTRICULAR THROMBUS: ICD-10-CM

## 2024-10-11 LAB
INR PPP: 1.7 (ref 0.9–1.1)
PROTHROMBIN TIME: 18.9 SECONDS

## 2024-10-11 PROCEDURE — 85610 PROTHROMBIN TIME: CPT

## 2024-10-11 PROCEDURE — G0463 HOSPITAL OUTPT CLINIC VISIT: HCPCS

## 2024-10-11 PROCEDURE — 36416 COLLJ CAPILLARY BLOOD SPEC: CPT

## 2024-10-11 PROCEDURE — 93798 PHYS/QHP OP CAR RHAB W/ECG: CPT

## 2024-10-11 NOTE — PROGRESS NOTES
Anticoagulation Clinic Progress Note    INR Goal: 2 - 3  Today's INR: 1.7 (subtherapeutic)  Current warfarin dose: warfarin 2 mg PO daily, except warfarin 4 mg PO on Wednesday.  Current total weekly dose = 16 mg per week. However, patient is not confident if he has actually been taking 4 mg on  or not. He believes he set up his pill box after the last appointment to include two of the 2 mg tablets on  but is not positive.    INR History:      Latest Ref Rng & Units 2024    10:45 AM 9/3/2024    12:00 AM 9/3/2024     9:52 AM 2024    10:00 AM 2024     5:03 PM 2024    10:00 AM 10/11/2024    10:00 AM   Anticoagulation Monitoring   INR  2.10  2.30 2.10  1.70 1.70   INR Date  2024  9/3/2024 2024  2024 10/11/2024   INR Goal  2.0-3.0  2.0-3.0 2.0-3.0  2.0-3.0 2.0-3.0   Trend  Same  Same Up  Up Up   Last Week Total  13 mg  13 mg 14 mg  14 mg 16 mg   Next Week Total  13 mg  13 mg 14 mg  16 mg 18 mg   Sun  2 mg  2 mg 2 mg  2 mg 2 mg   Mon  1 mg  - 2 mg  2 mg 4 mg   Tue  2 mg  2 mg 2 mg  2 mg 2 mg   Wed  2 mg  2 mg 2 mg  4 mg 2 mg   Thu  2 mg  2 mg 2 mg  2 mg 2 mg   Fri  2 mg  2 mg 2 mg  2 mg 4 mg   Sat  2 mg  2 mg 2 mg  2 mg 2 mg   Historical INR 0.90 - 1.10  2.30       1.51          This result is from an external source.       Anticoagulation Summary  As of 10/11/2024      INR goal:  2.0-3.0   TTR:  52.2% (1.9 mo)   INR used for dosin.70 (10/11/2024)   Warfarin maintenance plan:  4 mg every Mon, Fri; 2 mg all other days   Weekly warfarin total:  18 mg   Plan last modified:  Yudith Lima, PharmD (10/11/2024)   Next INR check:  10/18/2024   Target end date:      Indications    S/P CABG (coronary artery bypass graft) [Z95.1]  Left ventricular thrombus [I51.3]                 Anticoagulation Episode Summary       INR check location:      Preferred lab:      Send INR reminders to:  [facility] CLINICAL POOL    Comments:            Anticoagulation Care  Providers       Provider Role Specialty Phone number    Peg Pettit MD Referring Cardiology [phone]            Clinic Interview:   Patient Findings     Negatives:  Signs/symptoms of thrombosis, Signs/symptoms of bleeding,   Laboratory test error suspected, Change in health, Change in alcohol use,   Change in activity, Upcoming invasive procedure, Emergency department   visit, Upcoming dental procedure, Missed doses, Extra doses, Change in   medications, Change in diet/appetite, Hospital admission, Bruising, Other   complaints      Clinical Outcomes     Negatives:  Major bleeding event, Thromboembolic event,   Anticoagulation-related hospital admission, Anticoagulation-related ED   visit, Anticoagulation-related fatality        Current Medications:   Prior to Admission medications    Medication Sig Start Date End Date Taking? Authorizing Provider   Aspirin Low Dose 81 MG EC tablet Take 1 tablet by mouth Daily. 7/24/24   Jr Rubén Sanon MD   atorvastatin (LIPITOR) 40 MG tablet Take 1 tablet by mouth Every Night for 360 days. 8/4/24 7/30/25  Saima Valles APRN   escitalopram (LEXAPRO) 10 MG tablet Take 1 tablet by mouth Daily. 8/28/24   Odalys Peck MD   furosemide (LASIX) 40 MG tablet Take 1 tablet by mouth Daily. 9/30/24   Peg Pettit MD   Melatonin 5 MG chewable tablet Chew 2 tablets every night at bedtime. Indications: sleep 8/5/24   Odalys Peck MD   metoprolol succinate XL (TOPROL-XL) 25 MG 24 hr tablet Take 2 tablets by mouth Every 12 (Twelve) Hours for 90 days. Indications: High Blood Pressure Disorder 9/20/24 12/19/24  Peg Pettit MD   potassium chloride 10 MEQ CR tablet Take 1 tablet by mouth Daily. 9/30/24   Peg Pettit MD   warfarin (COUMADIN) 2 MG tablet 4mg daily titrate to INR.  Indications: Other - full anticoagulation, thrombus lv  Patient taking differently: Take 1 tablet by mouth Every Night. 1mg Mondays, 2mg all other days  Indications: Other  - full anticoagulation, thrombus lv 24   Saima Valles APRN       Medical history:   Past Medical History:   Diagnosis Date    Anxiety     Arthritis     At risk for sleep apnea     5    Back pain     started 2024 in lower back happens sporadically  no noticable blood in urine  no c/o nausea/ vomiting    Cardiomyopathy     Coronary artery disease     Heart valve disease     mitral    Hyperlipidemia     Hypertension        Social history:   Social History     Tobacco Use    Smoking status: Former     Current packs/day: 0.00     Average packs/day: 1.5 packs/day for 28.9 years (43.4 ttl pk-yrs)     Types: Cigarettes     Start date:      Quit date: 1999     Years since quittin.8     Passive exposure: Past    Smokeless tobacco: Never    Tobacco comments:     Smoked 1 pack daily  for 15 years quit    Substance Use Topics    Alcohol use: Yes     Comment: 1-2 monthly       Allergies:    Patient has no known allergies.    Vaccine History:   Immunization History   Administered Date(s) Administered    COVID-19 (MODERNA) Monovalent Original Booster 10/28/2021    DTaP 10/16/2012    Fluad Quad 65+ 10/12/2021    Fluzone  >6mos 2020    Fluzone High-Dose 65+YRS 2018    Hepatitis A 2019    Influenza Split Preservative Free ID 10/16/2012, 2014    Pneumococcal Conjugate 13-Valent (PCV13) 2018    Pneumococcal Polysaccharide (PPSV23) 1999, 10/16/2012    Td, Not Adsorbed 1999    Tdap 2013    Zostavax 2014       This patient is managed via a collaborative agreement, pursuant to [id number] [id number]. The signed protocol is kept in the [facility] at [address] [place] [address].    Education: Sudhir Diaz has been instructed to call if any changes in medications, doses, concerns, etc. Patient was provided dosing instructions and expressed verbal understanding and has no further questions at this time.    Plan:  1. Anticoagulation   -  increase by 12.5 %; warfarin 2 mg PO daily, except warfarin 4 mg PO on Monday and Friday.  New total weekly dose = 18 mg.   - RTC in 1 week(s)    Counseled patient on increased clotting risk associated with subtherapeutic INR, signs/symptoms to monitor for, and when to seek medical attention.       Yudith Lima PharmD  10/11/2024  10:09 EDT

## 2024-10-14 ENCOUNTER — TREATMENT (OUTPATIENT)
Dept: CARDIAC REHAB | Facility: HOSPITAL | Age: 69
End: 2024-10-14
Payer: MEDICARE

## 2024-10-14 DIAGNOSIS — Z95.1 S/P CABG (CORONARY ARTERY BYPASS GRAFT): Primary | ICD-10-CM

## 2024-10-14 PROCEDURE — 93798 PHYS/QHP OP CAR RHAB W/ECG: CPT

## 2024-10-16 ENCOUNTER — TREATMENT (OUTPATIENT)
Dept: CARDIAC REHAB | Facility: HOSPITAL | Age: 69
End: 2024-10-16
Payer: MEDICARE

## 2024-10-16 DIAGNOSIS — Z95.1 S/P CABG (CORONARY ARTERY BYPASS GRAFT): Primary | ICD-10-CM

## 2024-10-16 PROCEDURE — 93798 PHYS/QHP OP CAR RHAB W/ECG: CPT

## 2024-10-18 ENCOUNTER — ANTICOAGULATION VISIT (OUTPATIENT)
Dept: PHARMACY | Facility: HOSPITAL | Age: 69
End: 2024-10-18
Payer: MEDICARE

## 2024-10-18 ENCOUNTER — TREATMENT (OUTPATIENT)
Dept: CARDIAC REHAB | Facility: HOSPITAL | Age: 69
End: 2024-10-18
Payer: MEDICARE

## 2024-10-18 DIAGNOSIS — Z95.1 S/P CABG (CORONARY ARTERY BYPASS GRAFT): Primary | ICD-10-CM

## 2024-10-18 DIAGNOSIS — I51.3 LEFT VENTRICULAR THROMBUS: ICD-10-CM

## 2024-10-18 LAB
INR PPP: 2 (ref 0.9–1.1)
PROTHROMBIN TIME: 21.9 SECONDS

## 2024-10-18 PROCEDURE — 85610 PROTHROMBIN TIME: CPT

## 2024-10-18 PROCEDURE — G0463 HOSPITAL OUTPT CLINIC VISIT: HCPCS

## 2024-10-18 PROCEDURE — 36416 COLLJ CAPILLARY BLOOD SPEC: CPT

## 2024-10-18 PROCEDURE — 93798 PHYS/QHP OP CAR RHAB W/ECG: CPT

## 2024-10-18 NOTE — PROGRESS NOTES
Anticoagulation Clinic Progress Note    INR Goal: 2 - 3  Today's INR: 2.0 (therapeutic)  Current warfarin dose: warfarin 2 mg PO daily, except warfarin 4 mg PO on .  Current total weekly dose = 16 mg per week.     -Of note: Patient stated he thinks he has only been doing 2 mg daily except for 4 mg on  which differs from the last note of 2 mg daily except 4 mg on Monday and  = 18 mg.  Last 7 days is 12% decrease.      INR History:      Latest Ref Rng & Units 9/3/2024    12:00 AM 9/3/2024     9:52 AM 2024    10:00 AM 2024     5:03 PM 2024    10:00 AM 10/11/2024    10:00 AM 10/18/2024    10:00 AM   Anticoagulation Monitoring   INR   2.30 2.10  1.70 1.70 2.00   INR Date   9/3/2024 2024  2024 10/11/2024 10/18/2024   INR Goal   2.0-3.0 2.0-3.0  2.0-3.0 2.0-3.0 2.0-3.0   Trend   Same Up  Up Up Same   Last Week Total   13 mg 14 mg  14 mg 16 mg 18 mg   Next Week Total   13 mg 14 mg  16 mg 18 mg 18 mg   Sun   2 mg 2 mg  2 mg 2 mg 2 mg   Mon   - 2 mg  2 mg 4 mg 4 mg   Tue   2 mg 2 mg  2 mg 2 mg 2 mg   Wed   2 mg 2 mg  4 mg 2 mg 2 mg   Thu   2 mg 2 mg  2 mg 2 mg 2 mg   Fri   2 mg 2 mg  2 mg 4 mg 4 mg   Sat   2 mg 2 mg  2 mg 2 mg 2 mg   Historical INR 0.90 - 1.10 2.30       1.51           This result is from an external source.       Anticoagulation Summary  As of 10/18/2024      INR goal:  2.0-3.0   TTR:  46.6% (2.2 mo)   INR used for dosin.00 (10/18/2024)   Warfarin maintenance plan:  4 mg every Mon, Fri; 2 mg all other days   Weekly warfarin total:  18 mg   No change documented:  Shae Clancy, PharmD   Plan last modified:  Yudith Lima PharmD (10/11/2024)   Next INR check:  10/28/2024   Target end date:  --    Indications    S/P CABG (coronary artery bypass graft) [Z95.1]  Left ventricular thrombus [I51.3]                 Anticoagulation Episode Summary       INR check location:  --    Preferred lab:  --    Send INR reminders to:  [facility]  CLINICAL POOL    Comments:  --          Anticoagulation Care Providers       Provider Role Specialty Phone number    Peg Pettit MD Referring Cardiology [phone]            Clinic Interview:   Patient Findings     Negatives:  Signs/symptoms of thrombosis, Signs/symptoms of bleeding,   Laboratory test error suspected, Change in health, Change in alcohol use,   Change in activity, Upcoming invasive procedure, Emergency department   visit, Upcoming dental procedure, Missed doses, Extra doses, Change in   medications, Change in diet/appetite, Hospital admission, Bruising, Other   complaints      Clinical Outcomes     Negatives:  Major bleeding event, Thromboembolic event,   Anticoagulation-related hospital admission, Anticoagulation-related ED   visit, Anticoagulation-related fatality        Current Medications:   Prior to Admission medications    Medication Sig Start Date End Date Taking? Authorizing Provider   Aspirin Low Dose 81 MG EC tablet Take 1 tablet by mouth Daily. 7/24/24   Jr Rubén Sanon MD   atorvastatin (LIPITOR) 40 MG tablet Take 1 tablet by mouth Every Night for 360 days. 8/4/24 7/30/25  Saima Valles APRN   escitalopram (LEXAPRO) 10 MG tablet Take 1 tablet by mouth Daily. 8/28/24   Odalys Peck MD   furosemide (LASIX) 40 MG tablet Take 1 tablet by mouth Daily. 9/30/24   Peg Pettit MD   Melatonin 5 MG chewable tablet Chew 2 tablets every night at bedtime. Indications: sleep 8/5/24   Odalys Peck MD   metoprolol succinate XL (TOPROL-XL) 25 MG 24 hr tablet Take 2 tablets by mouth Every 12 (Twelve) Hours for 90 days. Indications: High Blood Pressure Disorder 9/20/24 12/19/24  Peg Pettit MD   potassium chloride 10 MEQ CR tablet Take 1 tablet by mouth Daily. 9/30/24   Peg Pettit MD   warfarin (COUMADIN) 2 MG tablet 4mg daily titrate to INR.  Indications: Other - full anticoagulation, thrombus lv  Patient taking differently: Take 1 tablet by mouth  Every Night. 1mg , 2mg all other days  Indications: Other - full anticoagulation, thrombus lv 24   Saima Valles APRN       Medical history:   Past Medical History:   Diagnosis Date    Anxiety     Arthritis     At risk for sleep apnea     5    Back pain     started 2024 in lower back happens sporadically  no noticable blood in urine  no c/o nausea/ vomiting    Cardiomyopathy     Coronary artery disease     Heart valve disease     mitral    Hyperlipidemia     Hypertension        Social history:   Social History     Tobacco Use    Smoking status: Former     Current packs/day: 0.00     Average packs/day: 1.5 packs/day for 28.9 years (43.4 ttl pk-yrs)     Types: Cigarettes     Start date:      Quit date: 1999     Years since quittin.8     Passive exposure: Past    Smokeless tobacco: Never    Tobacco comments:     Smoked 1 pack daily  for 15 years quit    Substance Use Topics    Alcohol use: Yes     Comment: 1-2 monthly       Allergies:    Patient has no known allergies.    Vaccine History:   Immunization History   Administered Date(s) Administered    COVID-19 (MODERNA) Monovalent Original Booster 10/28/2021    DTaP 10/16/2012    Fluad Quad 65+ 10/12/2021    Fluzone  >6mos 2020    Fluzone High-Dose 65+YRS 2018    Hepatitis A 2019    Influenza Split Preservative Free ID 10/16/2012, 2014    Pneumococcal Conjugate 13-Valent (PCV13) 2018    Pneumococcal Polysaccharide (PPSV23) 1999, 10/16/2012    Td, Not Adsorbed 1999    Tdap 2013    Zostavax 2014         This patient is managed via a collaborative agreement, pursuant to IC [id number]. The signed protocol is kept in the [facility] at [address] [place] [address].    Education: Sudhir Diaz has been instructed to call if any changes in medications, doses, concerns, etc. Patient was provided dosing instructions and expressed verbal understanding and has  no further questions at this time.    Plan:  1. Anticoagulation   - no change; warfarin 2 mg PO daily, except warfarin 4 mg PO on Wednesdays.   Total weekly dose = 16 mg.   - RTC in 1 week(s) in conjunction with Rehab appointment      Lolita Clancy, Julius  10/18/2024  09:39 EDT

## 2024-10-21 ENCOUNTER — TREATMENT (OUTPATIENT)
Dept: CARDIAC REHAB | Facility: HOSPITAL | Age: 69
End: 2024-10-21
Payer: MEDICARE

## 2024-10-21 DIAGNOSIS — Z95.1 S/P CABG (CORONARY ARTERY BYPASS GRAFT): Primary | ICD-10-CM

## 2024-10-21 PROCEDURE — 93798 PHYS/QHP OP CAR RHAB W/ECG: CPT

## 2024-10-21 RX ORDER — METOPROLOL SUCCINATE 50 MG/1
50 TABLET, EXTENDED RELEASE ORAL EVERY 12 HOURS
Qty: 180 TABLET | Refills: 2 | Status: SHIPPED | OUTPATIENT
Start: 2024-10-21

## 2024-10-21 NOTE — TELEPHONE ENCOUNTER
Rx Refill Note  Requested Prescriptions     Signed Prescriptions Disp Refills    metoprolol succinate XL (TOPROL-XL) 50 MG 24 hr tablet 180 tablet 2     Sig: Take 1 tablet by mouth Every 12 (Twelve) Hours.     Authorizing Provider: JOANIE REYES     Ordering User: BRITTANY GONCALVES      Last office visit with prescribing clinician: 9/30/2024   Last telemedicine visit with prescribing clinician: Visit date not found   Next office visit with prescribing clinician: 10/25/2024                         Would you like a call back once the refill request has been completed: [] Yes [] No    If the office needs to give you a call back, can they leave a voicemail: [] Yes [] No    Brittany Goncalves MA  10/21/24, 07:25 EDT

## 2024-10-21 NOTE — PROGRESS NOTES
Encounter Date:10/25/2024  Last seen 9/30/2024      Patient ID: Sudhir Diaz is a 69 y.o. male.    Chief Complaint:  Status post CABG  Left bundle branch block  Hypertension  Dyslipidemia     History of present illness  Since I have last seen, the patient has been without any chest discomfort ,shortness of breath, palpitations, dizziness or syncope.  Denies having any headache ,abdominal pain ,nausea, vomiting , diarrhea constipation, loss of weight or loss of appetite.  Denies having any excessive bruising ,hematuria or blood in the stool.    Review of all systems negative except as indicated.    Reviewed ROS.  Assessment and Plan      ]]]]]]]]]]]]]]]]]]]  History  ========  -Status post CABG x 4.  Mitral valve repair, closure of left atrial appendage internally-7/30/2024-[facility]-Dr. Sanon  Skeletonized LIMA to LAD.  Vein graft to PDA.  Vein graft to LV branch 1.  T graft BAL graft to LV branch 2.  Mitral valve repair with a 28 mm [employer] 3D ring.  Closure of left atrial appendage internally.     - Left bundle branch block-new since 8/1/2020.     - Cardiomyopathy-ischemic versus nonischemic.  Ejection fraction 40%-cath-6/19/2024  Ejection fraction 20 to 25%-echo-6/11/2024     - Left ventricular apical thrombus (seen on WADE intraoperatively) no thrombus seen on echo 10/25/2024.    Echocardiogram 10/25/2024  Structurally and functionally normal cardiac valves.  Status post mitral valve repair.  Left ventricular size and contractility is normal with ejection fraction of 55 %.  No evidence for intracardiac thrombus is present.  Grade 3 left ventricular diastolic dysfunction is present.  (MV E/A ratio 2.27)  Right ventricle is normal in size and hypocontractility with TAPSE of 1.2 cm..     - Anticoagulation-was on Coumadin-discontinued 10/25/2024     Cardiac catheterization 6/19/2024   No evidence for pulmonary hypertension is seen.  Left ventricle is normal in size with inferior wall and apical  hypokinesis with estimated ejection fraction of 40%.  No mitral regurgitation was seen.  Left main coronary artery is normal.  Left anterior descending artery has mid segment 80% disease.  Distal vessel is small in caliber.  Circumflex coronary artery is a small caliber vessel that has 99% disease in the midsegment.  Right coronary artery is a large and dominant vessel that has multiple areas of 95 to 99% disease.      Stress Cardiolite test 6/11/2024   Lexiscan Cardiolite test is abnormal with inferior ischemia.  Gated SPECT images revealed left ventricular enlargement with diffuse hypocontractility with ejection fraction of 36%.     Echocardiogram 6/11/2024  Moderate mitral mild tricuspid and pulmonic regurgitation is present.  Left atrial enlargement..  Left ventricle is enlarged with paradoxical septal motion (left bundle branch block) and severe and diffuse hypocontractility with ejection fraction of 20 to 25%.  Grade 1 left ventricular diastolic dysfunction is present.  Right ventricle is normal in size with hypocontractility.  TAPSE 1.3 cm..  Mild pulmonary hypertension.     - Hypertension dyslipidemia     - Large right groin lipoma.     - Status post cholecystectomy shoulder surgery and carpal tunnel surgery.     - Family history negative for coronary artery disease     - Former smoker     - No known allergies  ===========  Plan  ==========  Patient had CABG left atrial appendage closure and mitral valve repair at [facility] by Dr. Sanon on 7/30/2024.     Patient's postoperative course was essentially uneventful.  Patient was found to have LV thrombus with IntraOp WADE and patient was discharged home on warfarin.    Echocardiogram 10/25/2024  Structurally and functionally normal cardiac valves.  Status post mitral valve repair.  Left ventricular size and contractility is normal with ejection fraction of 55 %.  No evidence for intracardiac thrombus is present.  Grade 3 left ventricular diastolic  dysfunction is present.  (MV E/A ratio 2.27)  Right ventricle is normal in size and hypocontractility with TAPSE of 1.2 cm..     Ischemic cardiomyopathy.-Improved.  Left ventricular thrombus.  No evidence for thrombus was seen 10/25/2024     Anticoagulation  Patient was on Coumadin.  Discontinue Coumadin.    Left bundle branch block-new since 8/1/2020.  EKG 8/20/2024-sinus rhythm without left bundle branch block      Hypertension  Blood pressure 133/69    Dyslipidemia-patient is on Lipitor     Medications were reviewed and updated.    Follow-up in the office in 6 months.    Further plan will depend on patient's progress.     Reviewed updated 10/25/2024.  ]]]]]]]]]]]]]]]]]]]          Diagnosis Plan   1. S/P CABG (coronary artery bypass graft)        2. Essential hypertension        3. LBBB (left bundle branch block)        4. Chest discomfort        5. Chronic anticoagulation        6. Ischemic cardiomyopathy        7. Nonrheumatic mitral valve regurgitation        8. Shortness of breath        9. Left ventricular thrombus        LAB RESULTS (LAST 7 DAYS)    CBC        BMP        CMP         BNP        TROPONIN        CoAg  Results from last 7 days   Lab Units 10/18/24  0824   INR  2.00*       Creatinine Clearance  Estimated Creatinine Clearance: 86.5 mL/min (by C-G formula based on SCr of 0.89 mg/dL).    ABG        Radiology  No radiology results for the last day                The following portions of the patient's history were reviewed and updated as appropriate: allergies, current medications, past family history, past medical history, past social history, past surgical history, and problem list.    Review of Systems   Constitutional: Negative for malaise/fatigue.   Cardiovascular:  Positive for leg swelling. Negative for chest pain, dyspnea on exertion and palpitations.   Respiratory:  Negative for cough and shortness of breath.    Gastrointestinal:  Negative for abdominal pain, nausea and vomiting.    Neurological:  Negative for dizziness, focal weakness, headaches, light-headedness and numbness.   All other systems reviewed and are negative.      Current Outpatient Medications:     Aspirin Low Dose 81 MG EC tablet, Take 1 tablet by mouth Daily., Disp: 90 tablet, Rfl: 5    atorvastatin (LIPITOR) 40 MG tablet, Take 1 tablet by mouth Every Night for 360 days., Disp: 90 tablet, Rfl: 3    escitalopram (LEXAPRO) 10 MG tablet, Take 1 tablet by mouth Daily., Disp: , Rfl:     furosemide (LASIX) 40 MG tablet, Take 1 tablet by mouth Daily., Disp: 90 tablet, Rfl: 3    Melatonin 5 MG chewable tablet, Chew 2 tablets every night at bedtime. Indications: sleep, Disp: , Rfl:     metoprolol succinate XL (TOPROL-XL) 50 MG 24 hr tablet, Take 1 tablet by mouth Every 12 (Twelve) Hours., Disp: 180 tablet, Rfl: 2    potassium chloride 10 MEQ CR tablet, Take 1 tablet by mouth Daily., Disp: 90 tablet, Rfl: 3    warfarin (COUMADIN) 2 MG tablet, 4mg daily titrate to INR.  Indications: Other - full anticoagulation, thrombus lv (Patient taking differently: Take 1 tablet by mouth Every Night. 1mg Mondays, 2mg all other days  Indications: Other - full anticoagulation, thrombus lv), Disp: 60 tablet, Rfl: 2  No current facility-administered medications for this visit.    Facility-Administered Medications Ordered in Other Visits:     Chlorhexidine Gluconate Cloth 2 % pads 1 Application, 1 Application, Topical, Q12H PRN, Carlos Manuel Colón, EMILIA    No Known Allergies    Family History   Problem Relation Age of Onset    Malig Hyperthermia Neg Hx        Past Surgical History:   Procedure Laterality Date    CARDIAC CATHETERIZATION N/A 06/19/2024    Procedure: Left and Right Heart Cath with Coronary Angiography;  Surgeon: Peg Pettit MD;  Location: [facility] CATH INVASIVE LOCATION;  Service: Cardiovascular;  Laterality: N/A;    CARDIAC SURGERY      CARPAL TUNNEL RELEASE Bilateral     CHOLECYSTECTOMY      COLONOSCOPY      CORONARY ARTERY BYPASS GRAFT  WITH MITRAL VALVE REPAIR/REPLACEMENT N/A 2024    Procedure: STERNOTOMY, CORONARY ARTERY BYPASS TIMES FOUR  WITH LEFT INTERNAL MAMMARY ARTERY GRAFT AND LEFT SAPHENOUS  ENDOSCOPIC HARVESTED VEIN GRAFT; MITRAL VALVE REPAIR; TRANSESOPHAGEAL ECHOCARDIOGRAM WITH ANESTHESIA, PRP.;  Surgeon: Jr Rubén Sanon MD;  Location[facility];  Service: Cardiothoracic;  Laterality: N/A;    SHOULDER SURGERY Right        Past Medical History:   Diagnosis Date    Anxiety     Arthritis     At risk for sleep apnea     5    Back pain     started 2024 in lower back happens sporadically  no noticable blood in urine  no c/o nausea/ vomiting    Cardiomyopathy     Coronary artery disease     Heart valve disease     mitral    Hyperlipidemia     Hypertension        Family History   Problem Relation Age of Onset    Malig Hyperthermia Neg Hx        Social History     Socioeconomic History    Marital status:    Tobacco Use    Smoking status: Former     Current packs/day: 0.00     Average packs/day: 1.5 packs/day for 28.9 years (43.4 ttl pk-yrs)     Types: Cigarettes     Start date:      Quit date: 1999     Years since quittin.9     Passive exposure: Past    Smokeless tobacco: Never    Tobacco comments:     Smoked 1 pack daily  for 15 years quit    Vaping Use    Vaping status: Never Used   Substance and Sexual Activity    Alcohol use: Yes     Comment: 1-2 monthly    Drug use: Never    Sexual activity: Defer         Procedures      Objective:       Physical Exam    There were no vitals taken for this visit.  The patient is alert, oriented and in no distress.    Vital signs as noted above.    Head and neck revealed no carotid bruits or jugular venous distension.  No thyromegaly or lymphadenopathy is present.    Lungs clear.  No wheezing.  Breath sounds are normal bilaterally.    Heart normal first and second heart sounds.  No murmur..  No pericardial rub is present.  No gallop is present.    Abdomen soft and  nontender.  No organomegaly is present.    Extremities revealed good peripheral pulses without any pedal edema.    Skin warm and dry.    Musculoskeletal system is grossly normal.    CNS grossly normal.    Reviewed updated.

## 2024-10-23 ENCOUNTER — TREATMENT (OUTPATIENT)
Dept: CARDIAC REHAB | Facility: HOSPITAL | Age: 69
End: 2024-10-23
Payer: MEDICARE

## 2024-10-23 DIAGNOSIS — Z95.1 S/P CABG (CORONARY ARTERY BYPASS GRAFT): Primary | ICD-10-CM

## 2024-10-23 PROCEDURE — 93798 PHYS/QHP OP CAR RHAB W/ECG: CPT

## 2024-10-25 ENCOUNTER — HOSPITAL ENCOUNTER (OUTPATIENT)
Dept: CARDIOLOGY | Facility: HOSPITAL | Age: 69
Discharge: HOME | End: 2024-10-25
Payer: MEDICARE

## 2024-10-25 ENCOUNTER — TREATMENT (OUTPATIENT)
Dept: CARDIAC REHAB | Facility: HOSPITAL | Age: 69
End: 2024-10-25
Payer: MEDICARE

## 2024-10-25 ENCOUNTER — OFFICE VISIT (OUTPATIENT)
Dept: CARDIOLOGY | Facility: CLINIC | Age: 69
End: 2024-10-25
Payer: MEDICARE

## 2024-10-25 VITALS
WEIGHT: 170 LBS | HEART RATE: 58 BPM | BODY MASS INDEX: 24.34 KG/M2 | HEIGHT: 70 IN | DIASTOLIC BLOOD PRESSURE: 67 MMHG | SYSTOLIC BLOOD PRESSURE: 118 MMHG

## 2024-10-25 VITALS
SYSTOLIC BLOOD PRESSURE: 133 MMHG | DIASTOLIC BLOOD PRESSURE: 69 MMHG | HEIGHT: 70 IN | OXYGEN SATURATION: 99 % | HEART RATE: 55 BPM | BODY MASS INDEX: 24.77 KG/M2 | WEIGHT: 173 LBS

## 2024-10-25 DIAGNOSIS — I44.7 LBBB (LEFT BUNDLE BRANCH BLOCK): ICD-10-CM

## 2024-10-25 DIAGNOSIS — R06.02 SHORTNESS OF BREATH: ICD-10-CM

## 2024-10-25 DIAGNOSIS — Z95.1 S/P CABG (CORONARY ARTERY BYPASS GRAFT): Primary | ICD-10-CM

## 2024-10-25 DIAGNOSIS — R07.89 CHEST DISCOMFORT: ICD-10-CM

## 2024-10-25 DIAGNOSIS — I51.3 LEFT VENTRICULAR THROMBUS: ICD-10-CM

## 2024-10-25 DIAGNOSIS — I34.0 NONRHEUMATIC MITRAL VALVE REGURGITATION: ICD-10-CM

## 2024-10-25 DIAGNOSIS — I10 ESSENTIAL HYPERTENSION: ICD-10-CM

## 2024-10-25 DIAGNOSIS — I25.5 ISCHEMIC CARDIOMYOPATHY: ICD-10-CM

## 2024-10-25 DIAGNOSIS — Z95.1 HX OF CABG: ICD-10-CM

## 2024-10-25 DIAGNOSIS — Z95.1 S/P CABG (CORONARY ARTERY BYPASS GRAFT): ICD-10-CM

## 2024-10-25 DIAGNOSIS — Z79.01 CHRONIC ANTICOAGULATION: ICD-10-CM

## 2024-10-25 LAB
BH CV ECHO MEAS - AO MAX PG: 4 MMHG
BH CV ECHO MEAS - AO MEAN PG: 2.29 MMHG
BH CV ECHO MEAS - AO ROOT DIAM: 3.6 CM
BH CV ECHO MEAS - AO V2 MAX: 99.5 CM/SEC
BH CV ECHO MEAS - AO V2 VTI: 25.4 CM
BH CV ECHO MEAS - AVA(I,D): 1.8 CM2
BH CV ECHO MEAS - EDV(CUBED): 121.5 ML
BH CV ECHO MEAS - EDV(MOD-SP4): 92.9 ML
BH CV ECHO MEAS - EF(MOD-SP4): 54.8 %
BH CV ECHO MEAS - ESV(CUBED): 53.6 ML
BH CV ECHO MEAS - ESV(MOD-SP4): 42 ML
BH CV ECHO MEAS - FS: 23.9 %
BH CV ECHO MEAS - IVS/LVPW: 0.96 CM
BH CV ECHO MEAS - IVSD: 1.03 CM
BH CV ECHO MEAS - LA DIMENSION: 3.9 CM
BH CV ECHO MEAS - LV MASS(C)D: 193.1 GRAMS
BH CV ECHO MEAS - LV MAX PG: 0.97 MMHG
BH CV ECHO MEAS - LV MEAN PG: 0.63 MMHG
BH CV ECHO MEAS - LV V1 MAX: 49.3 CM/SEC
BH CV ECHO MEAS - LV V1 VTI: 12.6 CM
BH CV ECHO MEAS - LVIDD: 5 CM
BH CV ECHO MEAS - LVIDS: 3.8 CM
BH CV ECHO MEAS - LVOT AREA: 3.6 CM2
BH CV ECHO MEAS - LVOT DIAM: 2.15 CM
BH CV ECHO MEAS - LVPWD: 1.08 CM
BH CV ECHO MEAS - MV A MAX VEL: 47.1 CM/SEC
BH CV ECHO MEAS - MV DEC SLOPE: 429.3 CM/SEC2
BH CV ECHO MEAS - MV DEC TIME: 0.25 SEC
BH CV ECHO MEAS - MV E MAX VEL: 107 CM/SEC
BH CV ECHO MEAS - MV E/A: 2.27
BH CV ECHO MEAS - MV MAX PG: 6.7 MMHG
BH CV ECHO MEAS - MV MEAN PG: 2.19 MMHG
BH CV ECHO MEAS - MV V2 VTI: 51.2 CM
BH CV ECHO MEAS - MVA(VTI): 0.89 CM2
BH CV ECHO MEAS - PA V2 MAX: 96.5 CM/SEC
BH CV ECHO MEAS - PI END-D VEL: 105.2 CM/SEC
BH CV ECHO MEAS - RV MAX PG: 0.97 MMHG
BH CV ECHO MEAS - RV V1 MAX: 49.4 CM/SEC
BH CV ECHO MEAS - RV V1 VTI: 12.7 CM
BH CV ECHO MEAS - SV(LVOT): 45.6 ML
BH CV ECHO MEAS - SV(MOD-SP4): 50.9 ML
BH CV ECHO MEAS - TR MAX PG: 19.1 MMHG
BH CV ECHO MEAS - TR MAX VEL: 218.4 CM/SEC

## 2024-10-25 PROCEDURE — 93798 PHYS/QHP OP CAR RHAB W/ECG: CPT

## 2024-10-25 PROCEDURE — 93306 TTE W/DOPPLER COMPLETE: CPT

## 2024-10-28 ENCOUNTER — ANTICOAGULATION VISIT (OUTPATIENT)
Dept: PHARMACY | Facility: HOSPITAL | Age: 69
End: 2024-10-28
Payer: MEDICARE

## 2024-10-28 ENCOUNTER — TREATMENT (OUTPATIENT)
Dept: CARDIAC REHAB | Facility: HOSPITAL | Age: 69
End: 2024-10-28
Payer: COMMERCIAL

## 2024-10-28 DIAGNOSIS — I51.3 LEFT VENTRICULAR THROMBUS: ICD-10-CM

## 2024-10-28 DIAGNOSIS — Z95.1 S/P CABG (CORONARY ARTERY BYPASS GRAFT): Primary | ICD-10-CM

## 2024-10-28 LAB
INR PPP: 1.8 (ref 0.9–1.1)
PROTHROMBIN TIME: 19.6 SECONDS

## 2024-10-28 PROCEDURE — G0463 HOSPITAL OUTPT CLINIC VISIT: HCPCS

## 2024-10-28 PROCEDURE — 85610 PROTHROMBIN TIME: CPT

## 2024-10-28 PROCEDURE — 93798 PHYS/QHP OP CAR RHAB W/ECG: CPT

## 2024-10-28 PROCEDURE — 36416 COLLJ CAPILLARY BLOOD SPEC: CPT

## 2024-10-28 RX ORDER — METOPROLOL SUCCINATE 25 MG/1
50 TABLET, EXTENDED RELEASE ORAL EVERY 12 HOURS
Qty: 120 TABLET | Refills: 0 | OUTPATIENT
Start: 2024-10-28

## 2024-10-28 NOTE — PROGRESS NOTES
Anticoagulation Clinic Progress Note    INR Goal: 2 - 3  Today's INR: 1.8 (subtherapeutic)  Current warfarin dose: warfarin 2 mg PO daily, except warfarin 4 mg PO on Wednesday.  Current total weekly dose = 16 mg per week.     Patient is s/p CABG x4 with Dr. Sanon on  and they noted an LV thrombus intraop. He has been on warfarin since. He saw Dr. Pettit on Friday, 10/25, and echo no longer showed LV thrombus. Dr. Pettit told him to discontinue warfarin. Patient continued warfarin this weekend until appointment with clinic today. Per patient request, discussed with CV surgery and Dr. Sanon agrees with discontinuing warfarin at this time. Patient made aware and anticoagulation episode will be resolved. Patient to follow up with clinic should we be of service in the future.     INR History:      Latest Ref Rng & Units 9/3/2024     9:52 AM 2024    10:00 AM 2024     5:03 PM 2024    10:00 AM 10/11/2024    10:00 AM 10/18/2024    10:00 AM 10/28/2024    10:00 AM   Anticoagulation Monitoring   INR  2.30 2.10  1.70 1.70 2.00 1.80   INR Date  9/3/2024 2024  2024 10/11/2024 10/18/2024 10/28/2024   INR Goal  2.0-3.0 2.0-3.0  2.0-3.0 2.0-3.0 2.0-3.0 2.0-3.0   Trend  Same Up  Up Up Down Same   Last Week Total  13 mg 14 mg  14 mg 16 mg 18 mg 16 mg   Next Week Total  13 mg 14 mg  16 mg 18 mg 16 mg 0 mg   Sun  2 mg 2 mg  2 mg 2 mg 2 mg -   Mon  - 2 mg  2 mg 4 mg 2 mg -   Tue  2 mg 2 mg  2 mg 2 mg 2 mg -   Wed  2 mg 2 mg  4 mg 2 mg 4 mg -   Thu  2 mg 2 mg  2 mg 2 mg 2 mg -   Fri  2 mg 2 mg  2 mg 4 mg 2 mg -   Sat  2 mg 2 mg  2 mg 2 mg 2 mg -   Historical INR 0.90 - 1.10   1.51            Anticoagulation Summary  As of 10/28/2024      INR goal:  2.0-3.0   TTR:  40.4% (2.5 mo)   INR used for dosin.80 (10/28/2024)   Warfarin maintenance plan:  4 mg every Wed; 2 mg all other days   Weekly warfarin total:  16 mg   No change documented:  Yudith Lima, PharmD   Plan last modified:  Shae Clancy,  PharmD (10/18/2024)   Target end date:  --    Indications    S/P CABG (coronary artery bypass graft) (Resolved) [Z95.1]  Left ventricular thrombus (Resolved) [I51.3]                 Anticoagulation Episode Summary       INR check location:  --    Preferred lab:  --    Resolved date:  10/28/2024    Resolved reason:  Therapy Completed    Send INR reminders to:  [facility] CLINICAL POOL    Comments:  --          Anticoagulation Care Providers       Provider Role Specialty Phone number    Peg Pettit MD Referring Cardiology [phone]            Clinic Interview:   Patient Findings     Negatives:  Signs/symptoms of thrombosis, Signs/symptoms of bleeding,   Laboratory test error suspected, Change in health, Change in alcohol use,   Change in activity, Upcoming invasive procedure, Emergency department   visit, Upcoming dental procedure, Missed doses, Extra doses, Change in   medications, Change in diet/appetite, Hospital admission, Bruising, Other   complaints      Clinical Outcomes     Negatives:  Major bleeding event, Thromboembolic event,   Anticoagulation-related hospital admission, Anticoagulation-related ED   visit, Anticoagulation-related fatality        Current Medications:   Prior to Admission medications    Medication Sig Start Date End Date Taking? Authorizing Provider   Aspirin Low Dose 81 MG EC tablet Take 1 tablet by mouth Daily. 7/24/24   Jr Rubén Sanon MD   atorvastatin (LIPITOR) 40 MG tablet Take 1 tablet by mouth Every Night for 360 days. 8/4/24 7/30/25  Saima Valles APRN   escitalopram (LEXAPRO) 10 MG tablet Take 1 tablet by mouth Daily. 8/28/24   Odalys Peck MD   furosemide (LASIX) 40 MG tablet Take 1 tablet by mouth Daily. 9/30/24   Peg Pettit MD   Melatonin 5 MG chewable tablet Chew 2 tablets every night at bedtime. Indications: sleep 8/5/24   Odalys Peck MD   metoprolol succinate XL (TOPROL-XL) 50 MG 24 hr tablet Take 1 tablet by mouth Every  12 (Twelve) Hours. 10/21/24   Peg Pettit MD   potassium chloride 10 MEQ CR tablet Take 1 tablet by mouth Daily. 24   Peg Pettit MD   warfarin (COUMADIN) 2 MG tablet 4mg daily titrate to INR.  Indications: Other - full anticoagulation, thrombus lv  Patient taking differently: Take 1 tablet by mouth Every Night. 1mg , 2mg all other days  Indications: Other - full anticoagulation, thrombus lv 24   Saima Valles APRN       Medical history:   Past Medical History:   Diagnosis Date    Anxiety     Arthritis     At risk for sleep apnea     5    Back pain     started 2024 in lower back happens sporadically  no noticable blood in urine  no c/o nausea/ vomiting    Cardiomyopathy     Coronary artery disease     Heart valve disease     mitral    Hyperlipidemia     Hypertension        Social history:   Social History     Tobacco Use    Smoking status: Former     Current packs/day: 0.00     Average packs/day: 1.5 packs/day for 28.9 years (43.4 ttl pk-yrs)     Types: Cigarettes     Start date:      Quit date: 1999     Years since quittin.9     Passive exposure: Past    Smokeless tobacco: Never    Tobacco comments:     Smoked 1 pack daily  for 15 years quit    Substance Use Topics    Alcohol use: Yes     Comment: 1-2 monthly       Allergies:    Patient has no known allergies.    Vaccine History:   Immunization History   Administered Date(s) Administered    COVID-19 (MODERNA) Monovalent Original Booster 10/28/2021    DTaP 10/16/2012    Fluad Quad 65+ 10/12/2021    Fluzone  >6mos 2020    Fluzone High-Dose 65+YRS 2018    Hepatitis A 2019    Influenza Split Preservative Free ID 10/16/2012, 2014    Pneumococcal Conjugate 13-Valent (PCV13) 2018    Pneumococcal Polysaccharide (PPSV23) 1999, 10/16/2012    Td, Not Adsorbed 1999    Tdap 2013    Zostavax 2014       This patient is managed via a collaborative agreement, pursuant to IC  25-26-16. The signed protocol is kept in the [facility] at [address] [place] [address].    Education: Sudhir Diaz has been instructed to call if any changes in medications, doses, concerns, etc. Patient was provided dosing instructions and expressed verbal understanding and has no further questions at this time.    Plan:  1. Anticoagulation   - Patient now off of anticoagulation. He is encouraged to reach out to medication management clinic should we be of any service in the future.     Yudith Lima, CharuD  10/28/2024  12:36 EDT

## 2024-10-28 NOTE — TELEPHONE ENCOUNTER
Rx Refill Note  Requested Prescriptions     Pending Prescriptions Disp Refills    metoprolol succinate XL (TOPROL-XL) 25 MG 24 hr tablet [Pharmacy Med Name: Metoprolol Succinate ER Oral Tablet Extended Release 24 Hour 25 MG] 120 tablet 0     Sig: TAKE 2 TABLETS BY MOUTH EVERY 12 HOURS      Last office visit with prescribing clinician: 10/25/2024   Last telemedicine visit with prescribing clinician: Visit date not found   Next office visit with prescribing clinician: 4/25/2025                         Would you like a call back once the refill request has been completed: [] Yes [] No    If the office needs to give you a call back, can they leave a voicemail: [] Yes [] No    Ashley Peraza MA  10/28/24, 08:18 EDT

## 2024-10-30 ENCOUNTER — TREATMENT (OUTPATIENT)
Dept: CARDIAC REHAB | Facility: HOSPITAL | Age: 69
End: 2024-10-30
Payer: COMMERCIAL

## 2024-10-30 DIAGNOSIS — I25.5 ISCHEMIC CARDIOMYOPATHY: Primary | ICD-10-CM

## 2024-10-30 PROCEDURE — 93798 PHYS/QHP OP CAR RHAB W/ECG: CPT

## 2024-11-01 ENCOUNTER — TREATMENT (OUTPATIENT)
Dept: CARDIAC REHAB | Facility: HOSPITAL | Age: 69
End: 2024-11-01
Payer: COMMERCIAL

## 2024-11-01 DIAGNOSIS — Z95.1 S/P CABG (CORONARY ARTERY BYPASS GRAFT): Primary | ICD-10-CM

## 2024-11-01 PROCEDURE — 93798 PHYS/QHP OP CAR RHAB W/ECG: CPT

## 2024-11-04 ENCOUNTER — APPOINTMENT (OUTPATIENT)
Dept: CARDIAC REHAB | Facility: HOSPITAL | Age: 69
End: 2024-11-04
Payer: COMMERCIAL

## 2024-11-04 ENCOUNTER — TREATMENT (OUTPATIENT)
Dept: CARDIAC REHAB | Facility: HOSPITAL | Age: 69
End: 2024-11-04
Payer: COMMERCIAL

## 2024-11-04 DIAGNOSIS — Z95.1 S/P CABG (CORONARY ARTERY BYPASS GRAFT): Primary | ICD-10-CM

## 2024-11-04 PROCEDURE — 93798 PHYS/QHP OP CAR RHAB W/ECG: CPT

## 2024-11-06 ENCOUNTER — TREATMENT (OUTPATIENT)
Dept: CARDIAC REHAB | Facility: HOSPITAL | Age: 69
End: 2024-11-06
Payer: COMMERCIAL

## 2024-11-06 DIAGNOSIS — Z95.1 S/P CABG (CORONARY ARTERY BYPASS GRAFT): Primary | ICD-10-CM

## 2024-11-06 PROCEDURE — 93798 PHYS/QHP OP CAR RHAB W/ECG: CPT

## 2024-11-08 ENCOUNTER — TREATMENT (OUTPATIENT)
Dept: CARDIAC REHAB | Facility: HOSPITAL | Age: 69
End: 2024-11-08
Payer: COMMERCIAL

## 2024-11-08 DIAGNOSIS — Z95.1 S/P CABG (CORONARY ARTERY BYPASS GRAFT): Primary | ICD-10-CM

## 2024-11-08 PROCEDURE — 93798 PHYS/QHP OP CAR RHAB W/ECG: CPT

## 2024-11-11 ENCOUNTER — TREATMENT (OUTPATIENT)
Dept: CARDIAC REHAB | Facility: HOSPITAL | Age: 69
End: 2024-11-11
Payer: COMMERCIAL

## 2024-11-11 DIAGNOSIS — Z95.1 S/P CABG (CORONARY ARTERY BYPASS GRAFT): Primary | ICD-10-CM

## 2024-11-11 PROCEDURE — 93798 PHYS/QHP OP CAR RHAB W/ECG: CPT

## 2024-11-13 ENCOUNTER — TREATMENT (OUTPATIENT)
Dept: CARDIAC REHAB | Facility: HOSPITAL | Age: 69
End: 2024-11-13
Payer: COMMERCIAL

## 2024-11-13 DIAGNOSIS — Z95.1 S/P CABG (CORONARY ARTERY BYPASS GRAFT): Primary | ICD-10-CM

## 2024-11-13 PROCEDURE — 93798 PHYS/QHP OP CAR RHAB W/ECG: CPT

## 2024-11-15 ENCOUNTER — TREATMENT (OUTPATIENT)
Dept: CARDIAC REHAB | Facility: HOSPITAL | Age: 69
End: 2024-11-15
Payer: COMMERCIAL

## 2024-11-15 DIAGNOSIS — Z95.1 S/P CABG (CORONARY ARTERY BYPASS GRAFT): Primary | ICD-10-CM

## 2024-11-15 PROCEDURE — 93798 PHYS/QHP OP CAR RHAB W/ECG: CPT

## 2024-11-18 ENCOUNTER — TREATMENT (OUTPATIENT)
Dept: CARDIAC REHAB | Facility: HOSPITAL | Age: 69
End: 2024-11-18
Payer: COMMERCIAL

## 2024-11-18 DIAGNOSIS — Z95.1 S/P CABG (CORONARY ARTERY BYPASS GRAFT): Primary | ICD-10-CM

## 2024-11-18 PROCEDURE — 93798 PHYS/QHP OP CAR RHAB W/ECG: CPT

## 2024-11-20 ENCOUNTER — TREATMENT (OUTPATIENT)
Dept: CARDIAC REHAB | Facility: HOSPITAL | Age: 69
End: 2024-11-20
Payer: COMMERCIAL

## 2024-11-20 DIAGNOSIS — Z95.1 S/P CABG (CORONARY ARTERY BYPASS GRAFT): Primary | ICD-10-CM

## 2024-11-20 PROCEDURE — 93798 PHYS/QHP OP CAR RHAB W/ECG: CPT

## 2024-11-22 ENCOUNTER — TREATMENT (OUTPATIENT)
Dept: CARDIAC REHAB | Facility: HOSPITAL | Age: 69
End: 2024-11-22
Payer: COMMERCIAL

## 2024-11-22 DIAGNOSIS — Z95.1 S/P CABG (CORONARY ARTERY BYPASS GRAFT): Primary | ICD-10-CM

## 2024-11-22 PROCEDURE — 93798 PHYS/QHP OP CAR RHAB W/ECG: CPT

## 2024-11-25 ENCOUNTER — TREATMENT (OUTPATIENT)
Dept: CARDIAC REHAB | Facility: HOSPITAL | Age: 69
End: 2024-11-25
Payer: COMMERCIAL

## 2024-11-25 DIAGNOSIS — Z95.1 S/P CABG (CORONARY ARTERY BYPASS GRAFT): Primary | ICD-10-CM

## 2024-11-25 PROCEDURE — 93798 PHYS/QHP OP CAR RHAB W/ECG: CPT

## 2024-11-27 ENCOUNTER — TREATMENT (OUTPATIENT)
Dept: CARDIAC REHAB | Facility: HOSPITAL | Age: 69
End: 2024-11-27
Payer: COMMERCIAL

## 2024-11-27 DIAGNOSIS — Z95.1 S/P CABG (CORONARY ARTERY BYPASS GRAFT): Primary | ICD-10-CM

## 2024-11-27 PROCEDURE — 93798 PHYS/QHP OP CAR RHAB W/ECG: CPT

## 2024-11-29 ENCOUNTER — APPOINTMENT (OUTPATIENT)
Dept: CARDIAC REHAB | Facility: HOSPITAL | Age: 69
End: 2024-11-29
Payer: COMMERCIAL

## 2024-11-29 ENCOUNTER — TREATMENT (OUTPATIENT)
Dept: CARDIAC REHAB | Facility: HOSPITAL | Age: 69
End: 2024-11-29
Payer: COMMERCIAL

## 2024-11-29 DIAGNOSIS — Z95.1 S/P CABG (CORONARY ARTERY BYPASS GRAFT): Primary | ICD-10-CM

## 2024-11-29 PROCEDURE — 93798 PHYS/QHP OP CAR RHAB W/ECG: CPT

## 2025-01-27 ENCOUNTER — OFFICE VISIT (OUTPATIENT)
Dept: CARDIAC REHAB | Facility: HOSPITAL | Age: 70
End: 2025-01-27
Payer: COMMERCIAL

## 2025-01-27 DIAGNOSIS — Z95.1 S/P CABG (CORONARY ARTERY BYPASS GRAFT): Primary | ICD-10-CM

## 2025-01-29 ENCOUNTER — OFFICE VISIT (OUTPATIENT)
Dept: CARDIAC REHAB | Facility: HOSPITAL | Age: 70
End: 2025-01-29

## 2025-01-29 DIAGNOSIS — Z95.1 S/P CABG (CORONARY ARTERY BYPASS GRAFT): Primary | ICD-10-CM

## 2025-01-31 ENCOUNTER — OFFICE VISIT (OUTPATIENT)
Dept: CARDIAC REHAB | Facility: HOSPITAL | Age: 70
End: 2025-01-31
Payer: MEDICARE

## 2025-01-31 DIAGNOSIS — Z95.1 S/P CABG (CORONARY ARTERY BYPASS GRAFT): Primary | ICD-10-CM

## 2025-02-03 ENCOUNTER — OFFICE VISIT (OUTPATIENT)
Dept: CARDIAC REHAB | Facility: HOSPITAL | Age: 70
End: 2025-02-03
Payer: MEDICARE

## 2025-02-03 DIAGNOSIS — Z95.1 S/P CABG (CORONARY ARTERY BYPASS GRAFT): Primary | ICD-10-CM

## 2025-02-05 ENCOUNTER — OFFICE VISIT (OUTPATIENT)
Dept: CARDIAC REHAB | Facility: HOSPITAL | Age: 70
End: 2025-02-05
Payer: COMMERCIAL

## 2025-02-05 DIAGNOSIS — Z95.1 S/P CABG (CORONARY ARTERY BYPASS GRAFT): Primary | ICD-10-CM

## 2025-02-10 ENCOUNTER — OFFICE VISIT (OUTPATIENT)
Dept: CARDIAC REHAB | Facility: HOSPITAL | Age: 70
End: 2025-02-10

## 2025-02-10 DIAGNOSIS — Z95.1 S/P CABG (CORONARY ARTERY BYPASS GRAFT): Primary | ICD-10-CM

## 2025-02-12 ENCOUNTER — OFFICE VISIT (OUTPATIENT)
Dept: CARDIAC REHAB | Facility: HOSPITAL | Age: 70
End: 2025-02-12
Payer: MEDICARE

## 2025-02-12 DIAGNOSIS — Z95.1 S/P CABG (CORONARY ARTERY BYPASS GRAFT): Primary | ICD-10-CM

## 2025-02-14 ENCOUNTER — OFFICE VISIT (OUTPATIENT)
Dept: CARDIAC REHAB | Facility: HOSPITAL | Age: 70
End: 2025-02-14
Payer: COMMERCIAL

## 2025-02-14 DIAGNOSIS — Z95.1 S/P CABG (CORONARY ARTERY BYPASS GRAFT): Primary | ICD-10-CM

## 2025-02-17 ENCOUNTER — OFFICE VISIT (OUTPATIENT)
Dept: CARDIAC REHAB | Facility: HOSPITAL | Age: 70
End: 2025-02-17
Payer: COMMERCIAL

## 2025-02-17 DIAGNOSIS — Z95.1 S/P CABG (CORONARY ARTERY BYPASS GRAFT): Primary | ICD-10-CM

## 2025-02-19 ENCOUNTER — OFFICE VISIT (OUTPATIENT)
Dept: CARDIAC REHAB | Facility: HOSPITAL | Age: 70
End: 2025-02-19
Payer: COMMERCIAL

## 2025-02-19 DIAGNOSIS — R07.89 CHEST DISCOMFORT: Primary | ICD-10-CM

## 2025-02-24 ENCOUNTER — OFFICE VISIT (OUTPATIENT)
Dept: CARDIAC REHAB | Facility: HOSPITAL | Age: 70
End: 2025-02-24
Payer: COMMERCIAL

## 2025-02-24 DIAGNOSIS — I44.7 LBBB (LEFT BUNDLE BRANCH BLOCK): Primary | ICD-10-CM

## 2025-02-26 ENCOUNTER — OFFICE VISIT (OUTPATIENT)
Dept: CARDIAC REHAB | Facility: HOSPITAL | Age: 70
End: 2025-02-26
Payer: COMMERCIAL

## 2025-02-26 DIAGNOSIS — I10 ESSENTIAL HYPERTENSION: Primary | ICD-10-CM

## 2025-02-28 ENCOUNTER — OFFICE VISIT (OUTPATIENT)
Dept: CARDIAC REHAB | Facility: HOSPITAL | Age: 70
End: 2025-02-28
Payer: COMMERCIAL

## 2025-02-28 DIAGNOSIS — Z95.1 HX OF CABG: Primary | ICD-10-CM

## 2025-03-03 ENCOUNTER — OFFICE VISIT (OUTPATIENT)
Dept: CARDIAC REHAB | Facility: HOSPITAL | Age: 70
End: 2025-03-03
Payer: COMMERCIAL

## 2025-03-03 DIAGNOSIS — Z95.1 HX OF CABG: Primary | ICD-10-CM

## 2025-03-05 ENCOUNTER — OFFICE VISIT (OUTPATIENT)
Dept: CARDIAC REHAB | Facility: HOSPITAL | Age: 70
End: 2025-03-05
Payer: COMMERCIAL

## 2025-03-05 DIAGNOSIS — Z95.1 HX OF CABG: Primary | ICD-10-CM

## 2025-03-07 ENCOUNTER — OFFICE VISIT (OUTPATIENT)
Dept: CARDIAC REHAB | Facility: HOSPITAL | Age: 70
End: 2025-03-07
Payer: COMMERCIAL

## 2025-03-07 DIAGNOSIS — Z95.1 HX OF CABG: Primary | ICD-10-CM

## 2025-03-10 ENCOUNTER — OFFICE VISIT (OUTPATIENT)
Dept: CARDIAC REHAB | Facility: HOSPITAL | Age: 70
End: 2025-03-10
Payer: COMMERCIAL

## 2025-03-10 DIAGNOSIS — R06.02 SHORTNESS OF BREATH: Primary | ICD-10-CM

## 2025-03-12 ENCOUNTER — OFFICE VISIT (OUTPATIENT)
Dept: CARDIAC REHAB | Facility: HOSPITAL | Age: 70
End: 2025-03-12
Payer: COMMERCIAL

## 2025-03-12 DIAGNOSIS — Z95.1 S/P CABG (CORONARY ARTERY BYPASS GRAFT): Primary | ICD-10-CM

## 2025-03-14 ENCOUNTER — OFFICE VISIT (OUTPATIENT)
Dept: CARDIAC REHAB | Facility: HOSPITAL | Age: 70
End: 2025-03-14
Payer: COMMERCIAL

## 2025-03-14 DIAGNOSIS — Z95.1 S/P CABG (CORONARY ARTERY BYPASS GRAFT): Primary | ICD-10-CM

## 2025-03-17 ENCOUNTER — OFFICE VISIT (OUTPATIENT)
Dept: CARDIAC REHAB | Facility: HOSPITAL | Age: 70
End: 2025-03-17
Payer: COMMERCIAL

## 2025-03-17 DIAGNOSIS — Z95.1 S/P CABG (CORONARY ARTERY BYPASS GRAFT): Primary | ICD-10-CM

## 2025-03-19 ENCOUNTER — OFFICE VISIT (OUTPATIENT)
Dept: CARDIAC REHAB | Facility: HOSPITAL | Age: 70
End: 2025-03-19
Payer: COMMERCIAL

## 2025-03-19 DIAGNOSIS — Z95.1 S/P CABG (CORONARY ARTERY BYPASS GRAFT): Primary | ICD-10-CM

## 2025-03-21 ENCOUNTER — OFFICE VISIT (OUTPATIENT)
Dept: CARDIAC REHAB | Facility: HOSPITAL | Age: 70
End: 2025-03-21
Payer: COMMERCIAL

## 2025-03-21 DIAGNOSIS — Z95.1 S/P CABG (CORONARY ARTERY BYPASS GRAFT): Primary | ICD-10-CM

## 2025-03-24 ENCOUNTER — OFFICE VISIT (OUTPATIENT)
Dept: CARDIAC REHAB | Facility: HOSPITAL | Age: 70
End: 2025-03-24
Payer: COMMERCIAL

## 2025-03-24 DIAGNOSIS — Z95.1 S/P CABG (CORONARY ARTERY BYPASS GRAFT): Primary | ICD-10-CM

## 2025-03-26 ENCOUNTER — OFFICE VISIT (OUTPATIENT)
Dept: CARDIAC REHAB | Facility: HOSPITAL | Age: 70
End: 2025-03-26
Payer: COMMERCIAL

## 2025-03-26 DIAGNOSIS — Z95.1 S/P CABG (CORONARY ARTERY BYPASS GRAFT): Primary | ICD-10-CM

## 2025-03-26 DIAGNOSIS — Z95.1 HX OF CABG: ICD-10-CM

## 2025-03-28 ENCOUNTER — APPOINTMENT (OUTPATIENT)
Dept: CARDIAC REHAB | Facility: HOSPITAL | Age: 70
End: 2025-03-28

## 2025-03-31 ENCOUNTER — APPOINTMENT (OUTPATIENT)
Dept: CARDIAC REHAB | Facility: HOSPITAL | Age: 70
End: 2025-03-31

## 2025-04-02 ENCOUNTER — OFFICE VISIT (OUTPATIENT)
Dept: CARDIAC REHAB | Facility: HOSPITAL | Age: 70
End: 2025-04-02

## 2025-04-02 DIAGNOSIS — Z95.1 S/P CABG (CORONARY ARTERY BYPASS GRAFT): Primary | ICD-10-CM

## 2025-04-04 ENCOUNTER — OFFICE VISIT (OUTPATIENT)
Dept: CARDIAC REHAB | Facility: HOSPITAL | Age: 70
End: 2025-04-04

## 2025-04-04 DIAGNOSIS — I10 ESSENTIAL HYPERTENSION: Primary | ICD-10-CM

## 2025-04-07 ENCOUNTER — OFFICE VISIT (OUTPATIENT)
Dept: CARDIAC REHAB | Facility: HOSPITAL | Age: 70
End: 2025-04-07

## 2025-04-07 DIAGNOSIS — Z95.1 HX OF CABG: Primary | ICD-10-CM

## 2025-04-09 ENCOUNTER — OFFICE VISIT (OUTPATIENT)
Dept: CARDIAC REHAB | Facility: HOSPITAL | Age: 70
End: 2025-04-09

## 2025-04-09 DIAGNOSIS — Z95.1 S/P CABG (CORONARY ARTERY BYPASS GRAFT): Primary | ICD-10-CM

## 2025-04-11 ENCOUNTER — OFFICE VISIT (OUTPATIENT)
Dept: CARDIAC REHAB | Facility: HOSPITAL | Age: 70
End: 2025-04-11

## 2025-04-11 DIAGNOSIS — Z95.1 S/P CABG (CORONARY ARTERY BYPASS GRAFT): Primary | ICD-10-CM

## 2025-04-14 ENCOUNTER — OFFICE VISIT (OUTPATIENT)
Dept: CARDIAC REHAB | Facility: HOSPITAL | Age: 70
End: 2025-04-14

## 2025-04-14 DIAGNOSIS — Z95.1 S/P CABG (CORONARY ARTERY BYPASS GRAFT): Primary | ICD-10-CM

## 2025-04-16 ENCOUNTER — OFFICE VISIT (OUTPATIENT)
Dept: CARDIAC REHAB | Facility: HOSPITAL | Age: 70
End: 2025-04-16

## 2025-04-16 DIAGNOSIS — I34.0 NONRHEUMATIC MITRAL VALVE REGURGITATION: Primary | ICD-10-CM

## 2025-04-18 ENCOUNTER — OFFICE VISIT (OUTPATIENT)
Dept: CARDIAC REHAB | Facility: HOSPITAL | Age: 70
End: 2025-04-18

## 2025-04-18 DIAGNOSIS — I25.10 CORONARY ARTERY DISEASE INVOLVING NATIVE CORONARY ARTERY OF NATIVE HEART WITHOUT ANGINA PECTORIS: Primary | ICD-10-CM

## 2025-04-21 ENCOUNTER — OFFICE VISIT (OUTPATIENT)
Dept: CARDIAC REHAB | Facility: HOSPITAL | Age: 70
End: 2025-04-21

## 2025-04-21 DIAGNOSIS — I25.10 CORONARY ARTERY DISEASE INVOLVING NATIVE CORONARY ARTERY OF NATIVE HEART WITHOUT ANGINA PECTORIS: Primary | ICD-10-CM

## 2025-04-23 ENCOUNTER — OFFICE VISIT (OUTPATIENT)
Dept: CARDIAC REHAB | Facility: HOSPITAL | Age: 70
End: 2025-04-23

## 2025-04-23 ENCOUNTER — APPOINTMENT (OUTPATIENT)
Dept: CARDIAC REHAB | Facility: HOSPITAL | Age: 70
End: 2025-04-23

## 2025-04-23 DIAGNOSIS — I25.10 CORONARY ARTERY DISEASE INVOLVING NATIVE CORONARY ARTERY OF NATIVE HEART WITHOUT ANGINA PECTORIS: Primary | ICD-10-CM

## 2025-04-25 ENCOUNTER — OFFICE VISIT (OUTPATIENT)
Dept: CARDIAC REHAB | Facility: HOSPITAL | Age: 70
End: 2025-04-25

## 2025-04-25 DIAGNOSIS — Z95.1 HX OF CABG: Primary | ICD-10-CM

## 2025-04-28 ENCOUNTER — OFFICE VISIT (OUTPATIENT)
Dept: CARDIAC REHAB | Facility: HOSPITAL | Age: 70
End: 2025-04-28

## 2025-04-28 DIAGNOSIS — I25.10 CORONARY ARTERY DISEASE INVOLVING NATIVE CORONARY ARTERY OF NATIVE HEART WITHOUT ANGINA PECTORIS: Primary | ICD-10-CM

## 2025-04-30 ENCOUNTER — OFFICE VISIT (OUTPATIENT)
Dept: CARDIAC REHAB | Facility: HOSPITAL | Age: 70
End: 2025-04-30

## 2025-04-30 DIAGNOSIS — Z95.1 S/P CABG (CORONARY ARTERY BYPASS GRAFT): Primary | ICD-10-CM

## 2025-05-02 ENCOUNTER — APPOINTMENT (OUTPATIENT)
Dept: CARDIAC REHAB | Facility: HOSPITAL | Age: 70
End: 2025-05-02

## 2025-05-05 ENCOUNTER — OFFICE VISIT (OUTPATIENT)
Dept: CARDIAC REHAB | Facility: HOSPITAL | Age: 70
End: 2025-05-05

## 2025-05-05 DIAGNOSIS — Z95.1 S/P CABG (CORONARY ARTERY BYPASS GRAFT): Primary | ICD-10-CM

## 2025-05-07 ENCOUNTER — OFFICE VISIT (OUTPATIENT)
Dept: CARDIAC REHAB | Facility: HOSPITAL | Age: 70
End: 2025-05-07

## 2025-05-07 DIAGNOSIS — Z95.1 S/P CABG (CORONARY ARTERY BYPASS GRAFT): Primary | ICD-10-CM

## 2025-05-09 ENCOUNTER — OFFICE VISIT (OUTPATIENT)
Dept: CARDIAC REHAB | Facility: HOSPITAL | Age: 70
End: 2025-05-09

## 2025-05-09 DIAGNOSIS — Z95.1 S/P CABG (CORONARY ARTERY BYPASS GRAFT): Primary | ICD-10-CM

## 2025-05-12 ENCOUNTER — APPOINTMENT (OUTPATIENT)
Dept: CARDIAC REHAB | Facility: HOSPITAL | Age: 70
End: 2025-05-12

## 2025-05-14 ENCOUNTER — OFFICE VISIT (OUTPATIENT)
Dept: CARDIAC REHAB | Facility: HOSPITAL | Age: 70
End: 2025-05-14

## 2025-05-14 DIAGNOSIS — Z95.1 S/P CABG (CORONARY ARTERY BYPASS GRAFT): Primary | ICD-10-CM

## 2025-05-16 ENCOUNTER — OFFICE VISIT (OUTPATIENT)
Dept: CARDIAC REHAB | Facility: HOSPITAL | Age: 70
End: 2025-05-16

## 2025-05-16 DIAGNOSIS — I25.10 CORONARY ARTERY DISEASE INVOLVING NATIVE CORONARY ARTERY OF NATIVE HEART WITHOUT ANGINA PECTORIS: Primary | ICD-10-CM

## 2025-05-19 ENCOUNTER — OFFICE VISIT (OUTPATIENT)
Dept: CARDIAC REHAB | Facility: HOSPITAL | Age: 70
End: 2025-05-19

## 2025-05-19 DIAGNOSIS — Z95.1 HX OF CABG: Primary | ICD-10-CM

## 2025-05-21 ENCOUNTER — OFFICE VISIT (OUTPATIENT)
Dept: CARDIAC REHAB | Facility: HOSPITAL | Age: 70
End: 2025-05-21

## 2025-05-21 DIAGNOSIS — I25.10 CORONARY ARTERY DISEASE INVOLVING NATIVE CORONARY ARTERY OF NATIVE HEART WITHOUT ANGINA PECTORIS: Primary | ICD-10-CM

## 2025-05-23 ENCOUNTER — OFFICE VISIT (OUTPATIENT)
Dept: CARDIAC REHAB | Facility: HOSPITAL | Age: 70
End: 2025-05-23

## 2025-05-23 DIAGNOSIS — I25.10 CORONARY ARTERY DISEASE INVOLVING NATIVE CORONARY ARTERY OF NATIVE HEART WITHOUT ANGINA PECTORIS: Primary | ICD-10-CM

## 2025-05-26 ENCOUNTER — APPOINTMENT (OUTPATIENT)
Dept: CARDIAC REHAB | Facility: HOSPITAL | Age: 70
End: 2025-05-26

## 2025-05-28 ENCOUNTER — OFFICE VISIT (OUTPATIENT)
Dept: CARDIAC REHAB | Facility: HOSPITAL | Age: 70
End: 2025-05-28

## 2025-05-28 DIAGNOSIS — I25.10 CORONARY ARTERY DISEASE INVOLVING NATIVE CORONARY ARTERY OF NATIVE HEART WITHOUT ANGINA PECTORIS: Primary | ICD-10-CM

## 2025-05-30 ENCOUNTER — OFFICE VISIT (OUTPATIENT)
Dept: CARDIAC REHAB | Facility: HOSPITAL | Age: 70
End: 2025-05-30

## 2025-05-30 DIAGNOSIS — I25.10 CORONARY ARTERY DISEASE INVOLVING NATIVE CORONARY ARTERY OF NATIVE HEART WITHOUT ANGINA PECTORIS: Primary | ICD-10-CM

## 2025-06-02 ENCOUNTER — OFFICE VISIT (OUTPATIENT)
Dept: CARDIAC REHAB | Facility: HOSPITAL | Age: 70
End: 2025-06-02

## 2025-06-02 DIAGNOSIS — Z95.1 S/P CABG (CORONARY ARTERY BYPASS GRAFT): Primary | ICD-10-CM

## 2025-06-02 NOTE — PROGRESS NOTES
----- Message from DAYAMI Murrieta Phase 3

## 2025-06-04 ENCOUNTER — APPOINTMENT (OUTPATIENT)
Dept: CARDIAC REHAB | Facility: HOSPITAL | Age: 70
End: 2025-06-04

## 2025-06-06 ENCOUNTER — OFFICE VISIT (OUTPATIENT)
Dept: CARDIAC REHAB | Facility: HOSPITAL | Age: 70
End: 2025-06-06

## 2025-06-06 DIAGNOSIS — I25.10 CORONARY ARTERY DISEASE INVOLVING NATIVE CORONARY ARTERY OF NATIVE HEART WITHOUT ANGINA PECTORIS: Primary | ICD-10-CM

## 2025-06-09 ENCOUNTER — OFFICE VISIT (OUTPATIENT)
Dept: CARDIAC REHAB | Facility: HOSPITAL | Age: 70
End: 2025-06-09

## 2025-06-09 DIAGNOSIS — I25.10 CORONARY ARTERY DISEASE INVOLVING NATIVE CORONARY ARTERY OF NATIVE HEART WITHOUT ANGINA PECTORIS: Primary | ICD-10-CM

## 2025-06-11 ENCOUNTER — OFFICE VISIT (OUTPATIENT)
Dept: CARDIAC REHAB | Facility: HOSPITAL | Age: 70
End: 2025-06-11

## 2025-06-11 DIAGNOSIS — Z95.1 S/P CABG (CORONARY ARTERY BYPASS GRAFT): Primary | ICD-10-CM

## 2025-06-13 ENCOUNTER — OFFICE VISIT (OUTPATIENT)
Dept: CARDIAC REHAB | Facility: HOSPITAL | Age: 70
End: 2025-06-13

## 2025-06-13 DIAGNOSIS — Z95.1 S/P CABG (CORONARY ARTERY BYPASS GRAFT): Primary | ICD-10-CM

## 2025-06-16 ENCOUNTER — OFFICE VISIT (OUTPATIENT)
Dept: CARDIAC REHAB | Facility: HOSPITAL | Age: 70
End: 2025-06-16

## 2025-06-16 DIAGNOSIS — Z95.1 S/P CABG (CORONARY ARTERY BYPASS GRAFT): Primary | ICD-10-CM

## 2025-06-17 ENCOUNTER — OFFICE VISIT (OUTPATIENT)
Dept: CARDIOLOGY | Facility: CLINIC | Age: 70
End: 2025-06-17
Payer: COMMERCIAL

## 2025-06-17 VITALS
WEIGHT: 181 LBS | HEART RATE: 60 BPM | OXYGEN SATURATION: 97 % | BODY MASS INDEX: 25.91 KG/M2 | DIASTOLIC BLOOD PRESSURE: 71 MMHG | SYSTOLIC BLOOD PRESSURE: 117 MMHG | HEIGHT: 70 IN

## 2025-06-17 DIAGNOSIS — Z79.01 CHRONIC ANTICOAGULATION: ICD-10-CM

## 2025-06-17 DIAGNOSIS — I10 ESSENTIAL HYPERTENSION: ICD-10-CM

## 2025-06-17 DIAGNOSIS — Z95.1 HX OF CABG: ICD-10-CM

## 2025-06-17 DIAGNOSIS — I34.0 NONRHEUMATIC MITRAL VALVE REGURGITATION: ICD-10-CM

## 2025-06-17 DIAGNOSIS — R07.89 CHEST DISCOMFORT: ICD-10-CM

## 2025-06-17 DIAGNOSIS — I51.3 LEFT VENTRICULAR THROMBUS: ICD-10-CM

## 2025-06-17 DIAGNOSIS — I25.5 ISCHEMIC CARDIOMYOPATHY: ICD-10-CM

## 2025-06-17 DIAGNOSIS — I44.7 LBBB (LEFT BUNDLE BRANCH BLOCK): ICD-10-CM

## 2025-06-17 DIAGNOSIS — Z95.1 S/P CABG (CORONARY ARTERY BYPASS GRAFT): Primary | ICD-10-CM

## 2025-06-17 DIAGNOSIS — R06.02 SHORTNESS OF BREATH: ICD-10-CM

## 2025-06-17 NOTE — PROGRESS NOTES
Encounter Date:06/17/2025  Last seen 10/25/2024.      Patient ID: Sudhir Diaz is a 70 y.o. male.    Chief Complaint:  Status post CABG  Left bundle branch block  Hypertension  Dyslipidemia    History of present illness  Patient lost his wife in February.      Since I have last seen, the patient has been without any chest discomfort ,shortness of breath, palpitations, dizziness or syncope.  Denies having any headache ,abdominal pain ,nausea, vomiting , diarrhea constipation, loss of weight or loss of appetite.  Denies having any excessive bruising ,hematuria or blood in the stool.    Review of all systems negative except as indicated.    Reviewed ROS.  Assessment and Plan      ]]]]]]]]]]]]]]]]]]]  History  ========  -Status post CABG x 4.  Mitral valve repair, closure of left atrial appendage internally-7/30/2024-[facility]-Dr. Sanon  Skeletonized LIMA to LAD.  Vein graft to PDA.  Vein graft to LV branch 1.  T graft BAL graft to LV branch 2.  Mitral valve repair with a 28 mm [employer] 3D ring.  Closure of left atrial appendage internally.     - Left bundle branch block-new since 8/1/2020.     - Cardiomyopathy-ischemic versus nonischemic.  Ejection fraction 40%-cath-6/19/2024  Ejection fraction 20 to 25%-echo-6/11/2024     - Left ventricular apical thrombus (seen on WADE intraoperatively) no thrombus seen on echo 10/25/2024.     Echocardiogram 10/25/2024  Structurally and functionally normal cardiac valves.  Status post mitral valve repair.  Left ventricular size and contractility is normal with ejection fraction of 55 %.  No evidence for intracardiac thrombus is present.  Grade 3 left ventricular diastolic dysfunction is present.  (MV E/A ratio 2.27)  Right ventricle is normal in size and hypocontractility with TAPSE of 1.2 cm..     - Anticoagulation-was on Coumadin-discontinued 10/25/2024     Cardiac catheterization 6/19/2024   No evidence for pulmonary hypertension is seen.  Left ventricle is normal  in size with inferior wall and apical hypokinesis with estimated ejection fraction of 40%.  No mitral regurgitation was seen.  Left main coronary artery is normal.  Left anterior descending artery has mid segment 80% disease.  Distal vessel is small in caliber.  Circumflex coronary artery is a small caliber vessel that has 99% disease in the midsegment.  Right coronary artery is a large and dominant vessel that has multiple areas of 95 to 99% disease.      Stress Cardiolite test 6/11/2024   Lexiscan Cardiolite test is abnormal with inferior ischemia.  Gated SPECT images revealed left ventricular enlargement with diffuse hypocontractility with ejection fraction of 36%.     Echocardiogram 6/11/2024  Moderate mitral mild tricuspid and pulmonic regurgitation is present.  Left atrial enlargement..  Left ventricle is enlarged with paradoxical septal motion (left bundle branch block) and severe and diffuse hypocontractility with ejection fraction of 20 to 25%.  Grade 1 left ventricular diastolic dysfunction is present.  Right ventricle is normal in size with hypocontractility.  TAPSE 1.3 cm..  Mild pulmonary hypertension.     - Hypertension dyslipidemia     - Large right groin lipoma.     - Status post cholecystectomy shoulder surgery and carpal tunnel surgery.     - Family history negative for coronary artery disease     - Former smoker     - No known allergies  ===========  Plan  ==========  Status post CABG left atrial appendage closure and mitral valve repair at [facility] by Dr. Sanon on 7/30/2024.     Patient was found to have LV thrombus with IntraOp WADE and patient was discharged home on warfarin.    Anticoagulation status reviewed.  Patient is off Coumadin.     Echocardiogram 10/25/2024-as above     Ischemic cardiomyopathy-improved  History of left ventricular thrombus.  No evidence for thrombus was seen 10/25/2024     Left bundle branch block-new since 8/1/2020.  EKG 8/20/2024-sinus rhythm without  left bundle branch block      Hypertension-well-controlled  117/70.    Dyslipidemia-continue atorvastatin    Medications were reviewed and updated.    Follow-up in the office in 6 months.    Further plan will depend on patient's progress.    Reviewed and updated 6/17/2025.  ]]]]]]]]]]]]]]]]]]]               Diagnosis Plan   1. S/P CABG (coronary artery bypass graft)        2. Chest discomfort        3. Nonrheumatic mitral valve regurgitation        4. Essential hypertension        5. LBBB (left bundle branch block)        6. Chronic anticoagulation        7. Ischemic cardiomyopathy        8. Left ventricular thrombus        9. Hx of CABG        10. Shortness of breath        LAB RESULTS (LAST 7 DAYS)    CBC        BMP        CMP         BNP        TROPONIN        CoAg        Creatinine Clearance  CrCl cannot be calculated (Patient's most recent lab result is older than the maximum 30 days allowed.).    ABG        Radiology  No radiology results for the last day                The following portions of the patient's history were reviewed and updated as appropriate: allergies, current medications, past family history, past medical history, past social history, past surgical history, and problem list.    Review of Systems   Constitutional: Negative for malaise/fatigue.   Cardiovascular:  Negative for chest pain, leg swelling, palpitations and syncope.   Respiratory:  Negative for shortness of breath.    Skin:  Negative for rash.   Gastrointestinal:  Negative for nausea and vomiting.   Neurological:  Negative for dizziness, light-headedness and numbness.   All other systems reviewed and are negative.        Current Outpatient Medications:     Aspirin Low Dose 81 MG EC tablet, Take 1 tablet by mouth Daily., Disp: 90 tablet, Rfl: 5    atorvastatin (LIPITOR) 40 MG tablet, Take 1 tablet by mouth Every Night for 360 days., Disp: 90 tablet, Rfl: 3    escitalopram (LEXAPRO) 10 MG tablet, Take 1 tablet by mouth Daily., Disp: ,  Rfl:     furosemide (LASIX) 40 MG tablet, Take 1 tablet by mouth Daily., Disp: 90 tablet, Rfl: 3    Melatonin 5 MG chewable tablet, Chew 2 tablets every night at bedtime. Indications: sleep, Disp: , Rfl:     metoprolol succinate XL (TOPROL-XL) 50 MG 24 hr tablet, Take 1 tablet by mouth Every 12 (Twelve) Hours., Disp: 180 tablet, Rfl: 2    potassium chloride 10 MEQ CR tablet, Take 1 tablet by mouth Daily., Disp: 90 tablet, Rfl: 3  No current facility-administered medications for this visit.    Facility-Administered Medications Ordered in Other Visits:     Chlorhexidine Gluconate Cloth 2 % pads 1 Application, 1 Application, Topical, Q12H PRN, Carlos Manuel Colón PA-C    No Known Allergies    Family History   Problem Relation Age of Onset    Malig Hyperthermia Neg Hx        Past Surgical History:   Procedure Laterality Date    CARDIAC CATHETERIZATION N/A 06/19/2024    Procedure: Left and Right Heart Cath with Coronary Angiography;  Surgeon: Peg Pettit MD;  Location: [facility] CATH INVASIVE LOCATION;  Service: Cardiovascular;  Laterality: N/A;    CARDIAC SURGERY      CARPAL TUNNEL RELEASE Bilateral     CHOLECYSTECTOMY      COLONOSCOPY      CORONARY ARTERY BYPASS GRAFT WITH MITRAL VALVE REPAIR/REPLACEMENT N/A 07/30/2024    Procedure: STERNOTOMY, CORONARY ARTERY BYPASS TIMES FOUR  WITH LEFT INTERNAL MAMMARY ARTERY GRAFT AND LEFT SAPHENOUS  ENDOSCOPIC HARVESTED VEIN GRAFT; MITRAL VALVE REPAIR; TRANSESOPHAGEAL ECHOCARDIOGRAM WITH ANESTHESIA, PRP.;  Surgeon: Jr Rubén Sanon MD;  [facility];  Service: Cardiothoracic;  Laterality: N/A;    SHOULDER SURGERY Right        Past Medical History:   Diagnosis Date    Anxiety     Arthritis     At risk for sleep apnea     5    Back pain     started 7/25/2024 in lower back happens sporadically  no noticable blood in urine  no c/o nausea/ vomiting    Cardiomyopathy     Coronary artery disease     Heart valve disease     mitral    Hyperlipidemia     Hypertension   "      Family History   Problem Relation Age of Onset    Malig Hyperthermia Neg Hx        Social History     Socioeconomic History    Marital status:    Tobacco Use    Smoking status: Former     Current packs/day: 0.00     Average packs/day: 1.5 packs/day for 28.9 years (43.4 ttl pk-yrs)     Types: Cigarettes     Start date:      Quit date: 1999     Years since quittin.5     Passive exposure: Past    Smokeless tobacco: Never    Tobacco comments:     Smoked 1 pack daily  for 15 years quit    Vaping Use    Vaping status: Never Used   Substance and Sexual Activity    Alcohol use: Yes     Comment: 1-2 monthly    Drug use: Never    Sexual activity: Defer           ECG 12 Lead    Date/Time: 2025 3:21 PM  Performed by: Peg Pettit MD    Authorized by: Peg Pettit MD  Comparison: compared with previous ECG   Similar to previous ECG  Comparison to previous ECG: Normal sinus rhythm nonspecific ST-T wave abnormalities 68/min normal axis normal intervals no ectopy no significant change from previous EKG.        Objective:       Physical Exam    /71 (BP Location: Left arm, Patient Position: Sitting, Cuff Size: Adult)   Pulse 60   Ht 177.8 cm (70\")   Wt 82.1 kg (181 lb)   SpO2 97%   BMI 25.97 kg/m²   The patient is alert, oriented and in no distress.    Vital signs as noted above.    Head and neck revealed no carotid bruits or jugular venous distension.  No thyromegaly or lymphadenopathy is present.    Lungs clear.  No wheezing.  Breath sounds are normal bilaterally.    Heart normal first and second heart sounds.  No murmur..  No pericardial rub is present.  No gallop is present.    Abdomen soft and nontender.  No organomegaly is present.    Extremities revealed good peripheral pulses without any pedal edema.    Skin warm and dry.    Musculoskeletal system is grossly normal.    CNS grossly normal.    Reviewed and updated.        "

## 2025-06-18 ENCOUNTER — OFFICE VISIT (OUTPATIENT)
Dept: CARDIAC REHAB | Facility: HOSPITAL | Age: 70
End: 2025-06-18

## 2025-06-18 DIAGNOSIS — Z95.1 S/P CABG (CORONARY ARTERY BYPASS GRAFT): Primary | ICD-10-CM

## 2025-06-20 ENCOUNTER — OFFICE VISIT (OUTPATIENT)
Dept: CARDIAC REHAB | Facility: HOSPITAL | Age: 70
End: 2025-06-20

## 2025-06-20 DIAGNOSIS — Z95.1 S/P CABG (CORONARY ARTERY BYPASS GRAFT): Primary | ICD-10-CM

## 2025-06-23 ENCOUNTER — OFFICE VISIT (OUTPATIENT)
Dept: CARDIAC REHAB | Facility: HOSPITAL | Age: 70
End: 2025-06-23

## 2025-06-23 DIAGNOSIS — Z95.1 S/P CABG (CORONARY ARTERY BYPASS GRAFT): Primary | ICD-10-CM

## 2025-06-25 ENCOUNTER — OFFICE VISIT (OUTPATIENT)
Dept: CARDIAC REHAB | Facility: HOSPITAL | Age: 70
End: 2025-06-25

## 2025-06-25 DIAGNOSIS — Z95.1 S/P CABG (CORONARY ARTERY BYPASS GRAFT): Primary | ICD-10-CM

## 2025-06-27 ENCOUNTER — OFFICE VISIT (OUTPATIENT)
Dept: CARDIAC REHAB | Facility: HOSPITAL | Age: 70
End: 2025-06-27

## 2025-06-27 DIAGNOSIS — Z95.1 S/P CABG (CORONARY ARTERY BYPASS GRAFT): Primary | ICD-10-CM

## 2025-06-30 ENCOUNTER — OFFICE VISIT (OUTPATIENT)
Dept: CARDIAC REHAB | Facility: HOSPITAL | Age: 70
End: 2025-06-30

## 2025-06-30 DIAGNOSIS — Z95.1 S/P CABG (CORONARY ARTERY BYPASS GRAFT): Primary | ICD-10-CM

## 2025-07-02 ENCOUNTER — OFFICE VISIT (OUTPATIENT)
Dept: CARDIAC REHAB | Facility: HOSPITAL | Age: 70
End: 2025-07-02
Payer: MEDICARE

## 2025-07-02 DIAGNOSIS — Z95.1 S/P CABG (CORONARY ARTERY BYPASS GRAFT): Primary | ICD-10-CM

## 2025-07-04 ENCOUNTER — APPOINTMENT (OUTPATIENT)
Dept: CARDIAC REHAB | Facility: HOSPITAL | Age: 70
End: 2025-07-04

## 2025-07-07 ENCOUNTER — OFFICE VISIT (OUTPATIENT)
Dept: CARDIAC REHAB | Facility: HOSPITAL | Age: 70
End: 2025-07-07
Payer: MEDICARE

## 2025-07-07 DIAGNOSIS — Z95.1 S/P CABG (CORONARY ARTERY BYPASS GRAFT): Primary | ICD-10-CM

## 2025-07-09 ENCOUNTER — OFFICE VISIT (OUTPATIENT)
Dept: CARDIAC REHAB | Facility: HOSPITAL | Age: 70
End: 2025-07-09
Payer: MEDICARE

## 2025-07-09 DIAGNOSIS — Z95.1 S/P CABG (CORONARY ARTERY BYPASS GRAFT): Primary | ICD-10-CM

## 2025-07-11 ENCOUNTER — OFFICE VISIT (OUTPATIENT)
Dept: CARDIAC REHAB | Facility: HOSPITAL | Age: 70
End: 2025-07-11
Payer: MEDICARE

## 2025-07-11 DIAGNOSIS — Z95.1 S/P CABG (CORONARY ARTERY BYPASS GRAFT): Primary | ICD-10-CM

## 2025-07-14 ENCOUNTER — OFFICE VISIT (OUTPATIENT)
Dept: CARDIAC REHAB | Facility: HOSPITAL | Age: 70
End: 2025-07-14
Payer: MEDICARE

## 2025-07-14 DIAGNOSIS — Z95.1 S/P CABG (CORONARY ARTERY BYPASS GRAFT): Primary | ICD-10-CM

## 2025-07-16 ENCOUNTER — OFFICE VISIT (OUTPATIENT)
Dept: CARDIAC REHAB | Facility: HOSPITAL | Age: 70
End: 2025-07-16
Payer: MEDICARE

## 2025-07-16 DIAGNOSIS — Z95.1 S/P CABG (CORONARY ARTERY BYPASS GRAFT): Primary | ICD-10-CM

## 2025-07-18 ENCOUNTER — OFFICE VISIT (OUTPATIENT)
Dept: CARDIAC REHAB | Facility: HOSPITAL | Age: 70
End: 2025-07-18
Payer: MEDICARE

## 2025-07-18 DIAGNOSIS — Z95.1 S/P CABG (CORONARY ARTERY BYPASS GRAFT): Primary | ICD-10-CM

## 2025-07-21 ENCOUNTER — OFFICE VISIT (OUTPATIENT)
Dept: CARDIAC REHAB | Facility: HOSPITAL | Age: 70
End: 2025-07-21
Payer: MEDICARE

## 2025-07-21 DIAGNOSIS — Z95.1 S/P CABG (CORONARY ARTERY BYPASS GRAFT): Primary | ICD-10-CM

## 2025-07-23 ENCOUNTER — APPOINTMENT (OUTPATIENT)
Dept: CARDIAC REHAB | Facility: HOSPITAL | Age: 70
End: 2025-07-23

## 2025-07-25 ENCOUNTER — OFFICE VISIT (OUTPATIENT)
Dept: CARDIAC REHAB | Facility: HOSPITAL | Age: 70
End: 2025-07-25
Payer: MEDICARE

## 2025-07-25 DIAGNOSIS — I25.10 CORONARY ARTERY DISEASE INVOLVING NATIVE CORONARY ARTERY OF NATIVE HEART WITHOUT ANGINA PECTORIS: Primary | ICD-10-CM

## 2025-07-28 ENCOUNTER — OFFICE VISIT (OUTPATIENT)
Dept: CARDIAC REHAB | Facility: HOSPITAL | Age: 70
End: 2025-07-28

## 2025-07-28 DIAGNOSIS — I25.10 CORONARY ARTERY DISEASE INVOLVING NATIVE CORONARY ARTERY OF NATIVE HEART WITHOUT ANGINA PECTORIS: Primary | ICD-10-CM

## 2025-07-30 ENCOUNTER — OFFICE VISIT (OUTPATIENT)
Dept: CARDIAC REHAB | Facility: HOSPITAL | Age: 70
End: 2025-07-30

## 2025-07-30 DIAGNOSIS — Z95.1 S/P CABG (CORONARY ARTERY BYPASS GRAFT): Primary | ICD-10-CM

## 2025-07-31 RX ORDER — METOPROLOL SUCCINATE 50 MG/1
50 TABLET, EXTENDED RELEASE ORAL EVERY 12 HOURS
Qty: 180 TABLET | Refills: 3 | Status: SHIPPED | OUTPATIENT
Start: 2025-07-31

## 2025-07-31 NOTE — TELEPHONE ENCOUNTER
Rx Refill Note  Requested Prescriptions     Signed Prescriptions Disp Refills    metoprolol succinate XL (TOPROL-XL) 50 MG 24 hr tablet 180 tablet 3     Sig: Take 1 tablet by mouth Every 12 (Twelve) Hours.     Authorizing Provider: JOANIE REYES     Ordering User: BRITTANY GONCALVES      Last office visit with prescribing clinician: 6/17/2025   Last telemedicine visit with prescribing clinician: Visit date not found   Next office visit with prescribing clinician: 12/23/2025                         Would you like a call back once the refill request has been completed: [] Yes [] No    If the office needs to give you a call back, can they leave a voicemail: [] Yes [] No    Brittany Goncalves MA  07/31/25, 09:33 EDT

## 2025-08-01 ENCOUNTER — OFFICE VISIT (OUTPATIENT)
Dept: CARDIAC REHAB | Facility: HOSPITAL | Age: 70
End: 2025-08-01
Payer: MEDICARE

## 2025-08-01 DIAGNOSIS — Z95.1 S/P CABG (CORONARY ARTERY BYPASS GRAFT): Primary | ICD-10-CM

## 2025-08-04 ENCOUNTER — OFFICE VISIT (OUTPATIENT)
Dept: CARDIAC REHAB | Facility: HOSPITAL | Age: 70
End: 2025-08-04
Payer: MEDICARE

## 2025-08-04 DIAGNOSIS — Z95.1 S/P CABG (CORONARY ARTERY BYPASS GRAFT): Primary | ICD-10-CM

## 2025-08-06 ENCOUNTER — OFFICE VISIT (OUTPATIENT)
Dept: CARDIAC REHAB | Facility: HOSPITAL | Age: 70
End: 2025-08-06
Payer: MEDICARE

## 2025-08-06 DIAGNOSIS — Z95.1 S/P CABG (CORONARY ARTERY BYPASS GRAFT): Primary | ICD-10-CM

## 2025-08-08 ENCOUNTER — OFFICE VISIT (OUTPATIENT)
Dept: CARDIAC REHAB | Facility: HOSPITAL | Age: 70
End: 2025-08-08
Payer: MEDICARE

## 2025-08-08 DIAGNOSIS — Z95.1 S/P CABG (CORONARY ARTERY BYPASS GRAFT): Primary | ICD-10-CM

## 2025-08-11 ENCOUNTER — OFFICE VISIT (OUTPATIENT)
Dept: CARDIAC REHAB | Facility: HOSPITAL | Age: 70
End: 2025-08-11
Payer: MEDICARE

## 2025-08-11 DIAGNOSIS — Z95.1 S/P CABG (CORONARY ARTERY BYPASS GRAFT): Primary | ICD-10-CM

## 2025-08-13 ENCOUNTER — OFFICE VISIT (OUTPATIENT)
Dept: CARDIAC REHAB | Facility: HOSPITAL | Age: 70
End: 2025-08-13
Payer: MEDICARE

## 2025-08-13 DIAGNOSIS — Z95.1 S/P CABG (CORONARY ARTERY BYPASS GRAFT): Primary | ICD-10-CM

## 2025-08-15 ENCOUNTER — OFFICE VISIT (OUTPATIENT)
Dept: CARDIAC REHAB | Facility: HOSPITAL | Age: 70
End: 2025-08-15
Payer: MEDICARE

## 2025-08-15 DIAGNOSIS — Z95.1 S/P CABG (CORONARY ARTERY BYPASS GRAFT): Primary | ICD-10-CM

## 2025-08-18 ENCOUNTER — OFFICE VISIT (OUTPATIENT)
Dept: CARDIAC REHAB | Facility: HOSPITAL | Age: 70
End: 2025-08-18
Payer: MEDICARE

## 2025-08-18 DIAGNOSIS — Z95.1 S/P CABG (CORONARY ARTERY BYPASS GRAFT): Primary | ICD-10-CM

## 2025-08-22 ENCOUNTER — OFFICE VISIT (OUTPATIENT)
Dept: CARDIAC REHAB | Facility: HOSPITAL | Age: 70
End: 2025-08-22
Payer: MEDICARE

## 2025-08-22 DIAGNOSIS — Z95.1 S/P CABG (CORONARY ARTERY BYPASS GRAFT): Primary | ICD-10-CM

## 2025-08-27 ENCOUNTER — OFFICE VISIT (OUTPATIENT)
Dept: CARDIAC REHAB | Facility: HOSPITAL | Age: 70
End: 2025-08-27
Payer: MEDICARE

## 2025-08-27 DIAGNOSIS — Z95.1 S/P CABG (CORONARY ARTERY BYPASS GRAFT): Primary | ICD-10-CM

## 2025-08-29 ENCOUNTER — OFFICE VISIT (OUTPATIENT)
Dept: CARDIAC REHAB | Facility: HOSPITAL | Age: 70
End: 2025-08-29
Payer: MEDICARE

## 2025-08-29 DIAGNOSIS — Z95.1 S/P CABG (CORONARY ARTERY BYPASS GRAFT): Primary | ICD-10-CM

## (undated) DEVICE — ELECTRD DEFIB M/FUNC PROPADZ RADIOL 2PK

## (undated) DEVICE — Device

## (undated) DEVICE — SWAN-GANZ TRUE SIZE THERMODILUTION "S" TIP: Brand: SWAN-GANZ TRUE SIZE

## (undated) DEVICE — PK ATS CUST W CARDIOTOMY RESEVOIR

## (undated) DEVICE — CONN STR 1/2INX3/8IN

## (undated) DEVICE — 12 FOOT DISPOSABLE EXTENSION CABLE WITH SAFE CONNECT / SCREW-DOWN

## (undated) DEVICE — DRN WND CH RND FUL/FLUT NO/TROC 3/8IN 28F

## (undated) DEVICE — OASIS DRAIN, SINGLE, INLINE & ATS COMPATIBLE: Brand: OASIS

## (undated) DEVICE — PK TRY HEART CATH 50

## (undated) DEVICE — SOL IRR H2O BTL 1000ML STRL

## (undated) DEVICE — TUBING EXTENSION SET: Brand: ARGYLE

## (undated) DEVICE — KT CLS STERN FIBERTAPE W/4/CUT/NDL DISP

## (undated) DEVICE — LP VESL MAXI 2.5X1MM RED 2PK

## (undated) DEVICE — ADHS SKIN SURG TISS VISC PREMIERPRO EXOFIN HI/VISC FAST/DRY

## (undated) DEVICE — SUT PROLN 4/0 BB D/A 36IN 8581H

## (undated) DEVICE — HEMOCONCENTRATOR PERFUS LPS06

## (undated) DEVICE — 3M™ TEGADERM™ CHG DRESSING 25/CARTON 4 CARTONS/CASE 1658: Brand: TEGADERM™

## (undated) DEVICE — CORONARY ARTERY BYPASS GRAFT MARKERS, STAINLESS STEEL, DISTAL, WITHOUT HOLDER: Brand: ANASTOMARK CORONARY ARTERY BYPASS GRAFT MARKERS, STAINLESS STEEL, DISTAL

## (undated) DEVICE — CVR PROB 96IN LF STRL

## (undated) DEVICE — ST TOURNI COMPL A/ 7IN

## (undated) DEVICE — DRSNG SURESITE WNDW 2.38X2.75

## (undated) DEVICE — PK PERFUS CUST W/CARDIOPLEGIA

## (undated) DEVICE — ST. SORBAVIEW ULTIMATE IJ SYSTEM A,C: Brand: CENTURION

## (undated) DEVICE — CATHETER,URETHRAL,REDRUBBER,STERILE,20FR: Brand: MEDLINE

## (undated) DEVICE — BLOWER/MISTER AXIOUS OPCAB W/TBG

## (undated) DEVICE — DECANTER BAG 9": Brand: MEDLINE INDUSTRIES, INC.

## (undated) DEVICE — DRSNG WND GEL FIBR OPTICELL AG PLS W/SLV LF 4X5IN  STRL

## (undated) DEVICE — ROTATING SURGICAL PUNCHES, 1 PER POUCH: Brand: A&E MEDICAL / ROTATING SURGICAL PUNCHES

## (undated) DEVICE — CATH DIAG IMPULSE PIG 5F 100CM

## (undated) DEVICE — SOL IRR NACL 0.9PCT BT 1000ML

## (undated) DEVICE — DRSNG WND GZ PAD BORDERED 4X8IN STRL

## (undated) DEVICE — DRP SLUSH WARMR MACH CIR 44X44IN

## (undated) DEVICE — DGW .035 MC J3MM 150CM T H AMP: Brand: EMERALD

## (undated) DEVICE — TTL1LYR 16FR10ML 100%SIL TMPST TR: Brand: MEDLINE

## (undated) DEVICE — BG TRANSF W/COUPLER SPK 600ML

## (undated) DEVICE — AVID DUAL STAGE VENOUS DRAINAGE CANNULA: Brand: AVID DUAL STAGE VENOUS DRAINAGE CANNULA

## (undated) DEVICE — BLAKE SILICONE DRAINS CARDIO CONNECTOR 2:1: Brand: BLAKE

## (undated) DEVICE — SPNG GZ WOVN 4X4IN 12PLY 10/BX STRL

## (undated) DEVICE — GLV SURG BIOGEL M LTX PF 7 1/2

## (undated) DEVICE — SYR LL TP 10ML STRL

## (undated) DEVICE — CATH DIAG IMPULSE FR4 5F 100CM

## (undated) DEVICE — PK SUT OPN HEART POLLOCK CUST

## (undated) DEVICE — PK HEART OPN 40

## (undated) DEVICE — SYS VASOVIEW HEMOPRO ENDOSCOPIC HARVST VESL

## (undated) DEVICE — SYR MEDALLION SALINE PLUNGR/WHITE 10ML

## (undated) DEVICE — CANN ART SOFTFLOW EXT W/SUT/RNG 7MM

## (undated) DEVICE — PINNACLE INTRODUCER SHEATH: Brand: PINNACLE

## (undated) DEVICE — SYS PERFUS SEP PLATLT W TIPS CUST

## (undated) DEVICE — CATH DIAG IMPULSE FL4 5F 100CM

## (undated) DEVICE — MEDICINE CUP, GRADUATED, STER: Brand: MEDLINE

## (undated) DEVICE — CLAMP INSERT: Brand: STEALTH® CLAMP INSERT

## (undated) DEVICE — SOL ISO/ALC 70PCT 4OZ

## (undated) DEVICE — ACUMEN IQ SENSOR: Brand: ACUMEN IQ

## (undated) DEVICE — TBG INSUFFLATION LUER LOCK: Brand: MEDLINE INDUSTRIES, INC.

## (undated) DEVICE — IRRIGATOR BULB ASEPTO 60CC STRL

## (undated) DEVICE — LOU OPEN HEART DR POLLOCK: Brand: MEDLINE INDUSTRIES, INC.